# Patient Record
Sex: FEMALE | Employment: UNEMPLOYED | URBAN - METROPOLITAN AREA
[De-identification: names, ages, dates, MRNs, and addresses within clinical notes are randomized per-mention and may not be internally consistent; named-entity substitution may affect disease eponyms.]

---

## 2017-12-04 ENCOUNTER — OFFICE VISIT (OUTPATIENT)
Dept: FAMILY MEDICINE CLINIC | Age: 27
End: 2017-12-04

## 2017-12-04 VITALS
TEMPERATURE: 97.6 F | BODY MASS INDEX: 35.63 KG/M2 | HEART RATE: 65 BPM | SYSTOLIC BLOOD PRESSURE: 110 MMHG | HEIGHT: 62 IN | WEIGHT: 193.6 LBS | DIASTOLIC BLOOD PRESSURE: 71 MMHG | RESPIRATION RATE: 18 BRPM | OXYGEN SATURATION: 100 %

## 2017-12-04 DIAGNOSIS — Z3A.16 16 WEEKS GESTATION OF PREGNANCY: ICD-10-CM

## 2017-12-04 DIAGNOSIS — E66.9 OBESITY (BMI 35.0-39.9 WITHOUT COMORBIDITY): ICD-10-CM

## 2017-12-04 DIAGNOSIS — N92.6 MISSED PERIOD: Primary | ICD-10-CM

## 2017-12-04 PROBLEM — Z64.1 MULTIPARITY: Status: ACTIVE | Noted: 2017-12-04

## 2017-12-04 LAB
HCG URINE, QL. (POC): POSITIVE
VALID INTERNAL CONTROL?: YES

## 2017-12-04 NOTE — PROGRESS NOTES
[address] [facility] [address] 34   Office [phone], Fax [phone]      Subjective:     CC: Confirming pregnancy  History provided by patient and frined. Interpretor services have been used. HPI:  Murali Landon is a 33 y.o. PATIENT REFUSED female with no significant previous medical hx presenting for confirmation of pregnancy. Found out beginning of the November (LMP aug 20th). Had home pregnancy test to confirm and also had gone to a clinic. Father will be involved. This is patient's 4th baby. Pt endorses nausea but no vomiting. Endorses mild lower back pain and breast feeling b/l. Pt denies CP, SOB, bleeding, discharge, UTI sxs, HA, vision changes, leg swelling. OBhx  LMP 17. 16w6d, STEPHANIE 5/15/2018. Do you have regular periods pror?  yes  Neg for hx of GDM, GHTN, PreE, Eclampsia  GYN conditions: negative for Fibroids, adenomyosis    [id number]  Preg 1:  (8month) - 5lbs, 6 yo old girl, PTL   Preg 2: term 7lbs, girl, no complications  Preg 3: triplet -> placenta problem -> d&c  Preg 4: term, 6lbs, no complication                                          GYNhx  - Hx of STD: no  - Last pap:a year ago, results negative  - Obgyn: Seen at [place] (for clinic)  - Protection: yes  - Contraceptive method: injection  - Vaccinations: uptodate    Taking PNV    Medication reviewed  Allergy reviewed    SocHx  - smoking: no   - alcohol: no  - drugs: no  - sexually active: yes    ROS (bolded are positive):   General Negative for fever, chills, changes in weight (increase), changes in appetite   HEENT Negative for hearing loss, earache, congestion, sore throat   CV Negative for chest pain, palpitations, edema   Respiratory Negative for cough, shortness of breath, wheezing   GI Negative for change in bowel habits, abdominal pain, black or bloody stools, nausea or vomiting    Negative for frequency, dysuria, hematuria, vaginal discharge   MSK Negative for back pain, joint pain, muscle pain   Breast Negative for breast lumps, nipple discharge, galactorrhea, breast fullness   Skin Negative for itching, rash, hives   Neuro Negative for dizziness, headache, confusion, weakness   Psych Negative for anxiety, depression, change in mood   Heme/lymph Negative for bleeding, bruising, pallor     No past medical history on file. No current outpatient prescriptions on file prior to visit. No current facility-administered medications on file prior to visit. No Known Allergies    No past surgical history on file. Social History     Social History    Marital status: UNKNOWN     Spouse name: N/A    Number of children: N/A    Years of education: N/A     Occupational History    Not on file. Social History Main Topics    Smoking status: Not on file    Smokeless tobacco: Not on file    Alcohol use Not on file    Drug use: Not on file    Sexual activity: Not on file     Other Topics Concern    Not on file     Social History Narrative    No narrative on file       There is no problem list on file for this patient. Objective:   Vitals - reviewed  Visit Vitals    /71 (BP 1 Location: Right arm, BP Patient Position: Sitting)    Pulse 65    Temp 97.6 °F (36.4 °C) (Oral)    Resp 18    Ht 5' 2\" (1.575 m)    Wt 193 lb 9.6 oz (87.8 kg)    LMP 08/20/2017    SpO2 100%    BMI 35.41 kg/m2        Physical Exam   Constitutional: She is oriented to person, place, and time. She appears well-developed and well-nourished. No distress. HENT:   Head: Normocephalic and atraumatic. Nose: Nose normal.   Eyes: Conjunctivae and EOM are normal. Pupils are equal, round, and reactive to light. Neck: Normal range of motion. Neck supple. No JVD present. No tracheal deviation present. Cardiovascular: Normal rate, regular rhythm and normal heart sounds. Pulmonary/Chest: Effort normal and breath sounds normal. She has no wheezes. She exhibits no tenderness.    Breast fullness b/l Abdominal: Soft. Bowel sounds are normal. She exhibits no distension. There is no tenderness. Musculoskeletal: Normal range of motion. She exhibits no edema or tenderness (lower back tenderness started with pregnancy (mild)). Neurological: She is alert and oriented to person, place, and time. Skin: Skin is warm and dry. No rash noted. She is not diaphoretic. Psychiatric: She has a normal mood and affect. Her behavior is normal.       Labs: positive pregnancy test.      Assessment and orders:     28yo F previously healthy [id number] @ 16w6d. STEPHANIE 5/15/18. Hx of PTL with first pregnancy. ICD-10-CM ICD-9-CM    1. Missed period N92.6 626.4 AMB POC URINE PREGNANCY TEST, VISUAL COLOR COMPARISON   2. 16 weeks gestation of pregnancy Z3A.16 V22.2 prenatal vit-iron fumarate-fa 27 mg iron- 0.8 mg tab tablet   3. Obesity (BMI 35.0-39.9 without comorbidity) E66.9 278.00      1. 16 week gestation pregnancy - confirmed pregnancy. Taking prenatals. Currently uncomplicated. Follow-up Disposition:  Return in 1 week (on 12/11/2017) for Initial OB visit (8:45AM with Dr Krystyna Cramer). 2. Obesity: Will discuss weight gain and healthy eating on initial OB visit. Pt was discussed and seen by Dr Keenan Silvestre (attending physician). I have reviewed patient medical and social history and medications. I have reviewed pertinent labs results and other data. I have discussed the diagnosis with the patient and the intended plan as seen in the above orders. The patient has received an after-visit summary and questions were answered concerning future plans. I have discussed medication side effects and warnings with the patient as well.     Stacie Leyva MD  Resident [facility]  12/04/17

## 2017-12-04 NOTE — MR AVS SNAPSHOT
Visit Information Date & Time Provider Department Dept. Phone Encounter #  
 12/4/2017 10:00 AM Georgina Hillman MD [facility] [phone] [id number] Follow-up Instructions Return in 1 week (on 12/11/2017) for Initial OB visit (8:45AM with Dr Ty Bowers). Upcoming Health Maintenance Date Due  
 HPV AGE 9Y-34Y (1 of 3 - Female 3 Dose Series) 8/30/2002 DTaP/Tdap/Td series (1 - Tdap) 8/30/2012 PAP AKA CERVICAL CYTOLOGY 8/30/2012 Influenza Age 5 to Adult 8/1/2017 Allergies as of 12/4/2017  Review Complete On: 12/4/2017 By: Georgina Hillman MD  
 No Known Allergies Current Immunizations  Never Reviewed No immunizations on file. Not reviewed this visit You Were Diagnosed With   
  
 Codes Comments Missed period    -  Primary ICD-10-CM: N92.6 ICD-9-CM: 626.4 16 weeks gestation of pregnancy     ICD-10-CM: Z3A.16 
ICD-9-CM: V22.2 Obesity (BMI 35.0-39.9 without comorbidity)     ICD-10-CM: C68.6 ICD-9-CM: 278.00 Vitals BP Pulse Temp Resp Height(growth percentile) Weight(growth percentile) 110/71 (BP 1 Location: Right arm, BP Patient Position: Sitting) 65 97.6 °F (36.4 °C) (Oral) 18 5' 2\" (1.575 m) 193 lb 9.6 oz (87.8 kg) LMP SpO2 BMI OB Status 08/20/2017 100% 35.41 kg/m2 Pregnant BMI and BSA Data Body Mass Index Body Surface Area  
 35.41 kg/m 2 1.96 m 2 Your Updated Medication List  
  
   
This list is accurate as of: 12/4/17 11:10 AM.  Always use your most recent med list.  
  
  
  
  
 prenatal vit-iron fumarate-fa 27 mg iron- 0.8 mg Tab tablet Take 1 Tab by mouth daily. Prescriptions Printed Refills  
 prenatal vit-iron fumarate-fa 27 mg iron- 0.8 mg tab tablet 9 Sig: Take 1 Tab by mouth daily. Class: Print Route: Oral  
  
We Performed the Following AMB POC URINE PREGNANCY TEST, VISUAL COLOR COMPARISON [19978 CPT(R)] Follow-up Instructions Return in 1 week (on 12/11/2017) for Initial OB visit (8:45AM with Dr Adan Mccallum). Patient Instructions Semanas 14 a 18 de burch embarazo: Instrucciones de cuidado - [ Diana Contras 14 to 25 of Your Pregnancy: Care Instructions ] Instrucciones de cuidado Heather [employer], es posible que se le empiece a notar que está [place]. También podría observar algunos cambios en la piel, rajat picazón en algunas zonas de las matthew de las gray o acné en la davy. Lorena Coma, burch bebé puede orinar y herber primeras heces (meconio) comienzan a acumularse en el intestino. Vinetta Gregg a crecerle el christin en la rita. En burch próxima visita, [employer] 18 y 21, burch médico podría hacerle lennox ecografía. La prueba le permite al médico verificar si hay ciertos problemas. Burch médico también puede determinar el sexo de burch bebé. Starla es un buen momento para pensar si Chepe Johnson si burch bebé es Lovette Quivers. Hable con burch médico acerca de ponerse la vacuna contra la gripe para ayudar a mantenerse jen heather el embarazo. Con el transcurrir del [employer], es común sentirse preocupada o ansiosa. Burch cuerpo está [employer] Y usted está pensando en pineda a christiano, en la bryanna de burch bebé y en convertirse en madre. Puede aprender a sobrellevar la ansiedad y el estrés que siente. La atención de seguimiento es lennox parte clave de burch tratamiento y seguridad. Asegúrese de hacer y acudir a todas las citas, y llame a burch médico si está teniendo problemas. También es lennox buena idea saber los resultados de los exámenes y mantener lennox lista de los medicamentos que roxana. Cómo puede cuidarse en el hogar? ?Delaney Pelt ? · Pida ayuda para cocinar y [employer]. ? · Entienda quién o qué le provoca estrés. Evite a estas personas o situaciones tanto rajat le sea posible. ? · Relájese todos los días. Tomarse descansos de 10 a 15 minutos puede hacerle sentir lennox gran diferencia.  Camine, escuche música o tome un baño tibio.  
? · Wittmann clases de yoga o educación prenatal para aprender técnicas de relajación. También puede comprar un disco compacto de relajación. ? · [employer] lista de herber temores acerca de tener el bebé y ser [place]. Comparta la lista con alguien de saenz confianza. Makenzie Reef inquietudes son verdaderamente pequeñas, y trate de deshacerse de ellas. Ejercicio ? · Si no hizo mucho ejercicio antes del embarazo, comience poco a poco. Lo mejor es caminar. Regule saenz ritmo y chan un poco más cada día. ? · La caminata rápida, el trote lento, los ejercicios aeróbicos de bajo impacto, los ejercicios aeróbicos en el agua y el yoga son Bassem Adilia opciones. Algunos deportes, rajat el buceo, la equitación, el esquí Beatriz, la gimnasia y el esquí acuático no son Moriah Him idea. ? · Trate de hacer por lo menos 2½ horas de ejercicio moderado a la semana, rajat, por ejemplo, lennox caminata rápida. Mckenna Stephen de hacer esto es estar activo 30 minutos al día, por lo menos 5 días de la [place]. Está willem estar activo en bloques de 10 minutos o más ayaz el día y la [place]. ? · Use ropa holgada. Use zapatos y un sostén que le proporcionen un buen soporte. ? · Michelle Glimpse de calentamiento y enfriamiento para comenzar y finalizar herber ejercicios. ? · Si desea usar pesas, asegúrese de que jonathan livianas. Estas reducen la tensión en las articulaciones. ?Manténgase en el peso ideal para usted ? · Los expertos recomiendan el aumento de 1 hallie (medio kilo) al [employer] ayaz los 3 primeros meses del [facility]. ? · También recomiendan aumentar 1 hallie a la [employer] 6 últimos meses del [facility], para aumentar de 25 a 35 libras (11 a 16 kg) en total.  
? · Si está por debajo del peso recomendable para usted, necesitará aumentar más, de 28 a 40 libras (13 a 18 kg) aproximadamente. ? · Si tiene sobrepeso, quizás no deba aumentar tanto de [place], de 15 a 25 libras (7 a 11 kg) aproximadamente. ? · Si está subiendo de Anselmo Merritt, use saenz sentido común. [address] [employer], y LessThan3, la comida rápida y [place]. Gwyneth Kehr, lydias y verdonaldras. ? · Si va a tener gemelos o más bebés, es posible que saenz médico la remita a un dietista. Dónde puede encontrar más información en inglés? Sukumar Del Angel a http://abelardo-james.info/. Viri Don G120 en la búsqueda para aprender más acerca de \"Semanas 14 a 18 de saenz embarazo: Instrucciones de cuidado - [ Orvel Solian 14 to 25 of Your Pregnancy: Care Instructions ]. \" 
Revisado: 16 marzo, 2017 Versión del contenido: 11.4 © 5487-7598 Healthwise, Incorporated. Las instrucciones de cuidado fueron adaptadas bajo licencia por [employer] (which disclaims liability or warranty for this information). Si usted tiene [place] afección médica o sobre estas instrucciones, siempre pregunte a saenz profesional de bryanna. Healthwise, Incorporated niega toda garantía o responsabilidad por saenz uso de esta información. Introducing [facility] & HEALTH SERVICES! [employer] introduces [employer] patient portal. Now you can access parts of your medical record, email your doctor's office, and request medication refills online. 1. In your internet browser, go to [email or web address] 2. Click on the First Time User? Click Here link in the Sign In box. You will see the New Member Sign Up page. 3. Enter your SessionM Access Code exactly as it appears below. You will not need to use this code after youve completed the sign-up process. If you do not sign up before the expiration date, you must request a new code. · SessionM Access Code: [email or web address] Expires: 3/4/2018 11:03 AM 
 
4. Enter the last four digits of your Social Security Number (xxxx) and Date of Birth (mm/dd/yyyy) as indicated and click Submit. You will be taken to the next sign-up page. 5. Create a [employer] ID. This will be your [employer] login ID and cannot be changed, so think of one that is secure and easy to remember. 6. Create a [employer] password. You can change your password at any time. 7. Enter your Password Reset Question and Answer. This can be used at a later time if you forget your password. 8. Enter your e-mail address. You will receive e-mail notification when new information is available in [address] 9. Click Sign Up. You can now view and download portions of your medical record. 10. Click the Download Summary menu link to download a portable copy of your medical information. If you have questions, please visit the Frequently Asked Questions section of the [employer] website. Remember, [employer] is NOT to be used for urgent needs. For medical emergencies, dial 911. Now available from your iPhone and Android! Please provide this summary of care documentation to your next provider. If you have any questions after today's visit, please call [phone].

## 2017-12-11 ENCOUNTER — INITIAL PRENATAL (OUTPATIENT)
Dept: FAMILY MEDICINE CLINIC | Age: 27
End: 2017-12-11

## 2017-12-11 VITALS
RESPIRATION RATE: 16 BRPM | HEART RATE: 74 BPM | BODY MASS INDEX: 34.96 KG/M2 | SYSTOLIC BLOOD PRESSURE: 100 MMHG | HEIGHT: 62 IN | OXYGEN SATURATION: 99 % | TEMPERATURE: 97 F | WEIGHT: 190 LBS | DIASTOLIC BLOOD PRESSURE: 67 MMHG

## 2017-12-11 DIAGNOSIS — Z3A.16 16 WEEKS GESTATION OF PREGNANCY: ICD-10-CM

## 2017-12-11 DIAGNOSIS — Z87.898: ICD-10-CM

## 2017-12-11 DIAGNOSIS — Z87.51 HISTORY OF PRETERM DELIVERY: ICD-10-CM

## 2017-12-11 DIAGNOSIS — Z98.890 S/P D&C (STATUS POST DILATION AND CURETTAGE): ICD-10-CM

## 2017-12-11 DIAGNOSIS — Z3A.16 16 WEEKS GESTATION OF PREGNANCY: Primary | ICD-10-CM

## 2017-12-11 LAB
ANTIBODY SCREEN, EXTERNAL: NEGATIVE
HIV, EXTERNAL: NONREACTIVE
RUBELLA, EXTERNAL: NORMAL
T. PALLIDUM, EXTERNAL: NEGATIVE
TYPE, ABO & RH, EXTERNAL: NORMAL

## 2017-12-11 NOTE — MR AVS SNAPSHOT
Visit Information Date & Time Provider Department Dept. Phone Encounter #  
 2017  8:45 AM Adams Cox MD [facility] [phone] [id number] Upcoming Health Maintenance Date Due  
 HPV AGE 9Y-34Y (1 of 3 - Female 3 Dose Series) 2002 DTaP/Tdap/Td series (1 - Tdap) 2012 PAP AKA CERVICAL CYTOLOGY 2012 Influenza Age 5 to Adult 2017 Allergies as of 2017  Review Complete On: 2017 By: Adams Cox MD  
 No Known Allergies Current Immunizations  Never Reviewed No immunizations on file. Not reviewed this visit You Were Diagnosed With   
  
 Codes Comments 16 weeks gestation of pregnancy    -  Primary ICD-10-CM: Z3A.16 
ICD-9-CM: V22.2 History of  delivery     ICD-10-CM: Z87.51 
ICD-9-CM: V13.21 Vitals BP Pulse Temp Resp Height(growth percentile) Weight(growth percentile) 100/67 74 97 °F (36.1 °C) (Oral) 16 5' 2\" (1.575 m) 190 lb (86.2 kg) LMP SpO2 BMI OB Status Smoking Status 2017 (Exact Date) 99% 34.75 kg/m2 Pregnant Never Smoker BMI and BSA Data Body Mass Index Body Surface Area 34.75 kg/m 2 1.94 m 2 Preferred Pharmacy Pharmacy Name Phone [facility] [address] Your Updated Medication List  
  
   
This list is accurate as of: 17  9:47 AM.  Always use your most recent med list.  
  
  
  
  
 prenatal vit-iron fumarate-fa 27 mg iron- 0.8 mg Tab tablet Take 1 Tab by mouth daily. We Performed the Following AFP TETRA SCREEN, OBSTETRICAL [id number] CPT(R)] ANTIBODY SCREEN [id number] CPT(R)] BLOOD TYPE, (ABO+RH) [40670 CPT(R)] CBC W/O DIFF [81945 CPT(R)] CHLAMYDIA/GC PCR [78416 CPT(R)] HEPATITIS B SURFACE ANTIGEN REFLEX TO CONFIRMATION [[id number] Custom] HIV 1/2 AG/AB, 4TH GENERATION,W RFLX CONFIRM [id number] CPT(R)] RUBELLA AB, IGG W4590742 CPT(R)] T PALLIDUM SCREEN W/REFLEX [[id number] Custom] VZV AB, IGG D441942 CPT(R)] Patient Instructions Weeks 14 to 18 of Your Pregnancy: Care Instructions Your Care Instructions During this time, you may start to \"show,\" so that you look pregnant to people around you. You may also notice some changes in your skin, such as itchy spots on your palms or acne on your face. Your baby is now able to pass urine, and your baby's first stool (meconium) is starting to collect in his or her intestines. Hair is also beginning to grow on your baby's head. At your next visit, between weeks 18 and 20, your doctor may do an ultrasound test. The test allows your doctor to check for certain problems. Your doctor can also tell the sex of your baby. This is a good time to think about whether you want to know whether your baby is a boy or a girl. Talk to your doctor about getting a flu shot to help keep you healthy during your pregnancy. As your pregnancy moves along, it is common to worry or feel anxious. Your body is changing a lot. And you are thinking about giving birth, the health of your baby, and becoming a parent. You can learn to cope with any anxiety and stress you feel. Follow-up care is a key part of your treatment and safety. Be sure to make and go to all appointments, and call your doctor if you are having problems. It's also a good idea to know your test results and keep a list of the medicines you take. How can you care for yourself at home? ?Reduce stress ? · Ask for help with cooking and housekeeping. ? · Figure out who or what causes your stress. Avoid these people or situations as much as possible. ? · Relax every day. Taking 10- to 15-minute breaks can make a big difference. Take a walk, listen to music, or take a warm bath. ? · Learn relaxation techniques at prenatal or yoga class. Or buy a relaxation tape. ? · List your fears about having a baby and becoming a parent. Share the list with someone you trust. Decide which worries are really small, and try to let them go. Exercise ? · If you did not exercise much before pregnancy, start slowly. Walking is best. Keli Kangirn yourself, and do a little more every day. ? · Brisk walking, easy jogging, low-impact aerobics, water aerobics, and yoga are good choices. Some sports, such as scuba diving, horseback riding, downhill skiing, gymnastics, and water skiing, are not a good idea. ? · Try to do at least 2½ hours a week of moderate exercise, such as a fast walk. One way to do this is to be active 30 minutes a day, at least 5 days a week. It's fine to be active in blocks of 10 minutes or more throughout your day and week. ? · Wear loose clothing. And wear shoes and a bra that provide good support. ? · Warm up and cool down to start and finish your exercise. ? · If you want to use weights, be sure to use light weights. They reduce stress on your joints. ?Stay at the best weight for you ? · Experts recommend that you gain about 1 pound a month during the first 3 months of your pregnancy. ? · Experts recommend that you gain about 1 pound a week during your last 6 months of pregnancy, for a total weight gain of 25 to 35 pounds. ? · If you are underweight, you will need to gain more weight (about 28 to 40 pounds). ? · If you are overweight, you may not need to gain as much weight (about 15 to 25 pounds). ? · If you are gaining weight too fast, use common sense. Exercise every day, and limit sweets, fast foods, and fats. Choose lean meats, fruits, and vegetables. ? · If you are having twins or more, your doctor may refer you to a dietitian. Where can you learn more? Go to http://abelardo-james.info/. Enter N286 in the search box to learn more about \"Weeks 14 to 18 of Your Pregnancy: Care Instructions. \" Current as of: March 16, 2017 Content Version: 11.4 © 0326-0809 Healthwise, [employer]. Care instructions adapted under license by [employer] (which disclaims liability or warranty for this information). If you have questions about a medical condition or this instruction, always ask your healthcare professional. [address] any warranty or liability for your use of this information. Introducing [facility] & HEALTH SERVICES! Hanna Ferreira introduces [employer] patient portal. Now you can access parts of your medical record, email your doctor's office, and request medication refills online. 1. In your internet browser, go to [email or web address] 2. Click on the First Time User? Click Here link in the Sign In box. You will see the New Member Sign Up page. 3. Enter your [employer] Access Code exactly as it appears below. You will not need to use this code after youve completed the sign-up process. If you do not sign up before the expiration date, you must request a new code. · [employer] Access Code: [email or web address] Expires: 3/4/2018 11:03 AM 
 
4. Enter the last four digits of your Social Security Number (xxxx) and Date of Birth (mm/dd/yyyy) as indicated and click Submit. You will be taken to the next sign-up page. 5. Create a [employer] ID. This will be your [employer] login ID and cannot be changed, so think of one that is secure and easy to remember. 6. Create a [employer] password. You can change your password at any time. 7. Enter your Password Reset Question and Answer. This can be used at a later time if you forget your password. 8. Enter your e-mail address. You will receive e-mail notification when new information is available in [address] 9. Click Sign Up. You can now view and download portions of your medical record. 10. Click the Download Summary menu link to download a portable copy of your medical information. If you have questions, please visit the Frequently Asked Questions section of the [employer] website. Remember, [employer] is NOT to be used for urgent needs. For medical emergencies, dial 911. Now available from your iPhone and Android! Please provide this summary of care documentation to your next provider. If you have any questions after today's visit, please call [phone].

## 2017-12-11 NOTE — PROGRESS NOTES
Chief Complaint   Patient presents with    Initial Prenatal Visit     Jack Raymond 1d today. 1. Have you been to the ER, urgent care clinic since your last visit? Hospitalized since your last visit? N/A    2. Have you seen or consulted any other health care providers outside of the [address] [place] [address] since your last visit? Include any pap smears or colon screening.  N/A

## 2017-12-11 NOTE — PROGRESS NOTES
[address] [facility] [address] [place] [address]   Office [phone], Fax [phone]      Subjective:   Stephanie Chapman is a 33 y.o. female who is being seen today for her first obstetrical visit. This is a planned pregnancy. Pt's friend was there for translation (consent form filled out). She is at 16w1d by LMP Aug 20th. STEPHANIE 2018. 1hr glucoca given. Pt denies vaginal discharge, bleeding, burning with urination, abdominal pain, HA, vision changes, CP, sob. +breast fullness. [id number]  Preg 1:  (8month) - 5lbs, 7 yo old girl, PTL   Preg 2: term 7lbs, girl, no complications  Preg 3: triplet -> placenta problem -> d&c  Preg 4: term, 6lbs, no complication      History of GDM or DM? No    History of GHTN or HTN? No    History of pre-eclampsia? No    Relationship with FOB:     She is taking prenatal vitamins. Desires first trimester dating ultrasound? yes    Desires second trimester fetal anatomy ultrasound? yes    Desires genetic testing for Down's Syndrome? yes      Allergies- reviewed:   No Known Allergies      Medications- reviewed:   Current Outpatient Prescriptions   Medication Sig    prenatal vit-iron fumarate-fa 27 mg iron- 0.8 mg tab tablet Take 1 Tab by mouth daily. No current facility-administered medications for this visit. Past Medical History- reviewed:  History reviewed. No pertinent past medical history. Past Surgical History- reviewed:   History reviewed. No pertinent surgical history. Social History- reviewed:  Social History     Social History    Marital status: UNKNOWN     Spouse name: N/A    Number of children: N/A    Years of education: N/A     Occupational History    Not on file.      Social History Main Topics    Smoking status: Never Smoker    Smokeless tobacco: Never Used    Alcohol use No    Drug use: No    Sexual activity: Yes     Partners: Male     Other Topics Concern    Not on file     Social History Narrative Immunizations- reviewed: There is no immunization history on file for this patient. Flu vaccine to be given next visit (tomorrow). Tdap to get 28-32wk    ROS  : Denies vaginal bleeding and leaking of fluid  GI: Endorses occasional nausea and vomiting      Objective:     Visit Vitals    /67    Pulse 74    Temp 97 °F (36.1 °C) (Oral)    Resp 16    Ht 5' 2\" (1.575 m)    Wt 190 lb (86.2 kg)    LMP 2017 (Exact Date)    SpO2 99%    BMI 34.75 kg/m2       Physical Exam:  GENERAL APPEARANCE: alert, well appearing, in no apparent distress  HEAD: normocephalic, atraumatic   LUNGS: clear to auscultation, no wheezes, rales or rhonchi, symmetric air entry  HEART: regular rate and rhythm, no murmurs  ABDOMEN: FHT present 145  BACK: no CVA tenderness  EXTERNAL GENITALIA: normal appearing vulva with no masses, tenderness or lesions  VAGINA: no abnormal discharge or lesions  CERVIX: no lesions or cervical motion tenderness  UTERUS: gravid  EXTREMITIES: no redness or tenderness in the calves or thighs, no edema  NEUROLOGICAL: alert, oriented, normal speech, no focal findings or movement disorder noted  SKIN: normal coloration and turgor, no rashes    Exam chaperoned by Nurse Marisol Alexandra    Labs:    No results found for this or any previous visit (from the past 12 hour(s)). Assessment:     Pregnancy at 16w1d by LMP      ICD-10-CM ICD-9-CM    1. 16 weeks gestation of pregnancy Z3A.16 V22.2 RUBELLA AB, IGG      VZV AB, IGG      T PALLIDUM SCREEN W/REFLEX      HEPATITIS B SURFACE ANTIGEN REFLEX TO CONFIRMATION      HIV 1/2 AG/AB, 4TH GENERATION,W RFLX CONFIRM      CHLAMYDIA/GC PCR      BLOOD TYPE, (ABO+RH)      ANTIBODY SCREEN      CBC W/O DIFF      AFP TETRA SCREEN, OBSTETRICAL      GLUCOSE, GESTATIONAL 1 HR TOLERANCE   2. History of  delivery Z87.51 V13.21    3.  Hx of triplet pregnancy in prior pregnancy Z87.59 V13.29        Plan:   · Initial labs/specimens collected (CBC, blood type & screen, Rh antibody screen, rubella titer, HBsAg titer, RPR, HIV, gonorrhea/chlamydia cx, 1hr Gtt due to BMI). F/u on results. Pap neg a year ago (acquiring results from [facility]), UA to be done on . · Hx of  delivery with 2 subsequent term deliveries - pt will be offered the choice for progesterone supplementation on first trimester ultrasound. · Flu shot to be given on 17 with first trimester ultrasound. Provider informed. Tdap during 28-32wks. · Patient scheduled for 1st trimester dating ultrasound on 17. · Patient scheduled for 2nd trimester fetal anatomy ultrasound with MFM on 2017. · Genetic testing for Down Syndrome is performed today. F/u on results. · Recommended prenatal vitamins - currently taking. · Follow-up in 4 week(s) for routine follow-up with Dr Deni Bustos. Discussed the patient's BMI with her. The BMI follow up plan is as follows: I have counseled this patient on diet and exercise regimens. The patient was counseled on the dangers of tobacco use, and pt does not smoke. Emergency contact info confirmed:   Cell: Will acquire next visit.     Orders Placed This Encounter    RUBELLA AB, IGG    VARICELLA-ZOSTER V AB, IGG    T PALLIDUM SCREEN W/REFLEX    HEPATITIS B SURFACE ANTIGEN REFLEX TO CONFIRMATION    HIV 1/2 AG/AB, 4TH GENERATION,W RFLX CONFIRM    CHLAMYDIA/GC PCR     Order Specific Question:   Sample source     Answer:   Cervical/vaginal swab [573]     Order Specific Question:   Specimen source     Answer:   Endocervix [350]    CBC W/O DIFF    AFP TETRA SCREEN, OBSTETRICAL     Order Specific Question:   Patient Height     Answer:   5     Order Specific Question:   Patient Weight     Answer:   190    GLUCOSE, GESTATIONAL 1 HR TOLERANCE     Standing Status:   Future     Number of Occurrences:   1     Standing Expiration Date:   2018    BLOOD TYPE, (ABO+RH)    ANTIBODY SCREEN       I have discussed the diagnosis with the patient and the intended plan as seen in the above orders. The patient has received an after-visit summary and questions were answered concerning future plans. I have discussed medication side effects and warnings with the patient as well. Informed pt to return to the office or go to the ER if she experiences vaginal bleeding, vaginal discharge, leaking of fluid, pelvic cramping.       Pt seen and discussed with Dr Vilma Dey (attending physician)    Man Morales MD  12/11/2017

## 2017-12-11 NOTE — PATIENT INSTRUCTIONS
Weeks 14 to 18 of Your Pregnancy: Care Instructions  Your Care Instructions    During this time, you may start to \"show,\" so that you look pregnant to people around you. You may also notice some changes in your skin, such as itchy spots on your palms or acne on your face. Your baby is now able to pass urine, and your baby's first stool (meconium) is starting to collect in his or her intestines. Hair is also beginning to grow on your baby's head. At your next visit, between weeks 18 and 20, your doctor may do an ultrasound test. The test allows your doctor to check for certain problems. Your doctor can also tell the sex of your baby. This is a good time to think about whether you want to know whether your baby is a boy or a girl. Talk to your doctor about getting a flu shot to help keep you healthy during your pregnancy. As your pregnancy moves along, it is common to worry or feel anxious. Your body is changing a lot. And you are thinking about giving birth, the health of your baby, and becoming a parent. You can learn to cope with any anxiety and stress you feel. Follow-up care is a key part of your treatment and safety. Be sure to make and go to all appointments, and call your doctor if you are having problems. It's also a good idea to know your test results and keep a list of the medicines you take. How can you care for yourself at home? ?Reduce stress  ? · Ask for help with cooking and housekeeping. ? · Figure out who or what causes your stress. Avoid these people or situations as much as possible. ? · Relax every day. Taking 10- to 15-minute breaks can make a big difference. Take a walk, listen to music, or take a warm bath. ? · Learn relaxation techniques at prenatal or yoga class. Or buy a relaxation tape. ? · List your fears about having a baby and becoming a parent. Share the list with someone you trust. Decide which worries are really small, and try to let them go. Exercise  ?  · If you did not exercise much before pregnancy, start slowly. Walking is best. Naomi Lemme yourself, and do a little more every day. ? · Brisk walking, easy jogging, low-impact aerobics, water aerobics, and yoga are good choices. Some sports, such as scuba diving, horseback riding, downhill skiing, gymnastics, and water skiing, are not a good idea. ? · Try to do at least 2½ hours a week of moderate exercise, such as a fast walk. One way to do this is to be active 30 minutes a day, at least 5 days a week. It's fine to be active in blocks of 10 minutes or more throughout your day and week. ? · Wear loose clothing. And wear shoes and a bra that provide good support. ? · Warm up and cool down to start and finish your exercise. ? · If you want to use weights, be sure to use light weights. They reduce stress on your joints. ?Stay at the best weight for you  ? · Experts recommend that you gain about 1 pound a month during the first 3 months of your pregnancy. ? · Experts recommend that you gain about 1 pound a week during your last 6 months of pregnancy, for a total weight gain of 25 to 35 pounds. ? · If you are underweight, you will need to gain more weight (about 28 to 40 pounds). ? · If you are overweight, you may not need to gain as much weight (about 15 to 25 pounds). ? · If you are gaining weight too fast, use common sense. Exercise every day, and limit sweets, fast foods, and fats. Choose lean meats, fruits, and vegetables. ? · If you are having twins or more, your doctor may refer you to a dietitian. Where can you learn more? Go to http://abelardo-james.info/. Enter I800 in the search box to learn more about \"Weeks 14 to 18 of Your Pregnancy: Care Instructions. \"  Current as of: March 16, 2017  Content Version: 11.4  © 4093-8873 [employer]. Care instructions adapted under license by [employer] (which disclaims liability or warranty for this information).  If you have questions about a medical condition or this instruction, always ask your healthcare professional. [place] [address] any warranty or liability for your use of this information.

## 2017-12-11 NOTE — PROGRESS NOTES
I saw and evaluated the patient, performing the key elements of the service. I discussed the findings, assessment and plan with the resident and agree with the resident's findings and plan as documented in the resident's note. 08ZV [id number] at 16w1d by LMP (pt has regular periods, is sure of LMP)  Prior pregnancy complicated by  delivery during first pregnancy with 2 subsequent full term deliveries.     /67  Pulse 74  Temp 97 °F (36.1 °C) (Oral)   Resp 16  Ht 5' 2\" (1.575 m)  Wt 190 lb (86.2 kg)  LMP 2017 (Exact Date)  SpO2 99%  BMI 34.75 kg/m2   NAD  Unable to assess fundus 2/2 body habitus  FTH present    Prenatal labs ordered  1hr gtt ordered due to obesity   Dating US at [facility] in next 1-2 wks  MFM 20US request sent

## 2017-12-12 ENCOUNTER — OFFICE VISIT (OUTPATIENT)
Dept: FAMILY MEDICINE CLINIC | Age: 27
End: 2017-12-12

## 2017-12-12 VITALS
HEART RATE: 87 BPM | OXYGEN SATURATION: 98 % | WEIGHT: 192 LBS | DIASTOLIC BLOOD PRESSURE: 69 MMHG | TEMPERATURE: 97.5 F | SYSTOLIC BLOOD PRESSURE: 109 MMHG | HEIGHT: 62 IN | RESPIRATION RATE: 17 BRPM | BODY MASS INDEX: 35.33 KG/M2

## 2017-12-12 DIAGNOSIS — R82.71 ASYMPTOMATIC BACTERIURIA DURING PREGNANCY IN SECOND TRIMESTER: ICD-10-CM

## 2017-12-12 DIAGNOSIS — Z34.80 ENCOUNTER FOR SUPERVISION OF OTHER NORMAL PREGNANCY, UNSPECIFIED TRIMESTER: Primary | ICD-10-CM

## 2017-12-12 DIAGNOSIS — O99.891 ASYMPTOMATIC BACTERIURIA DURING PREGNANCY IN SECOND TRIMESTER: ICD-10-CM

## 2017-12-12 DIAGNOSIS — Z23 ENCOUNTER FOR IMMUNIZATION: ICD-10-CM

## 2017-12-12 LAB
BILIRUB UR QL STRIP: NEGATIVE
GLUCOSE UR-MCNC: NEGATIVE MG/DL
KETONES P FAST UR STRIP-MCNC: NEGATIVE MG/DL
PH UR STRIP: 7 [PH] (ref 4.6–8)
PROT UR QL STRIP: NEGATIVE
SP GR UR STRIP: 1.01 (ref 1–1.03)
UA UROBILINOGEN AMB POC: NORMAL (ref 0.2–1)
URINALYSIS CLARITY POC: CLEAR
URINALYSIS COLOR POC: YELLOW
URINE BLOOD POC: NORMAL
URINE LEUKOCYTES POC: NORMAL
URINE NITRITES POC: NEGATIVE

## 2017-12-12 NOTE — PROGRESS NOTES
[facility]     Dating Ultrasound Procedure Report    Wendy Barnes is a 33 y.o. G5 [id number] with pregnancy at 16w 2d by LMP (2017) here for dating ultrasound. Role of ultrasound in pregnancy discussed with patient. Patient consents to undergo dating ultrasound. Patient reports nausea and vomiting every morning since becoming pregnant. She is not taking any medication for symptom. [id number]  Preg 1:  (8month) - 5lbs, 6 yo girl, PTL   Preg 2: term 7lbs, 11 yo girl, no complications  Preg 3: triplet -> placenta problem -> d&c  Preg 4: term, 6lbs, 3 yo boy, no complications    [facility]  OFFICE PROCEDURE PROGRESS NOTE      Chart reviewed for the following:   Edgar Carney MD, have reviewed the History, Physical and updated the Allergic reactions for [address] performed immediately prior to start of procedure:   Edgar Carney MD, have performed the following reviews on Wendy Barnes prior to the start of the procedure:            * Patient was identified by name and date of birth   * Agreement on procedure being performed was verified  * Risks and Benefits explained to the patient  * Procedure site verified and marked as necessary  * Patient was positioned for comfort  * Consent was signed and verified     Time: 2:06 PM    Date of procedure: 2017    Procedure performed by:  Marcelo Michelle MD    Provider assisted by: Oumou Mahoney MD    Patient assisted by: self    How tolerated by patient: tolerated the procedure well with no complications    Procedure in detail:    Ultrasound performed at bedside for evaluation of pregnancy. Ultrasound Type: Transabdominal  Findings: Single intrauterine pregnancy with EGA 14 weeks 3 days by CRL and fetal biometrics measurements on transabdominal ultrasound.   Positive fetal movement, fetal heart beat present, normal appearing amiotic fluid, normal uterus, other maternal structures not visualized. STEPHANIE 2018. CRL: 8.13 cm  BPD: 2.66 cm  HC: 10.60 cm  FL: 1.30 cm  FHR: 170 BPM  Estimated Gestational Age by Ultrasound: 14 weeks 3 days  Fetal Position: Breech  Placenta: Anterior   Assessment and Plan:     Case of a 32 yo  with pregnancy at [place] [address] 3d as suggested by transabdominal ultrasound findings showing a single intrauterine pregnancy with CRL and biometric measurements consistent with a 14w 3d fetus. STEPHANIE 2018 as per dating US. 1. As per history of a premature delivery and spontaneous , pt will be started on Tessa 250 mg IM weekly starting at EGA 16w to reduce risk of  labor. 2. Patient refuse genetic testing during this pregnancy. 3. Patient recommended to take OTC Zantac for management of morning nausea and vomiting. 4. Follow up with routine prenatal care and follow up visit in 2 weeks. Dr. Blas Heimlich (attending) present for entire procedure.   Edgar Valadez MD  PGY-2 Family Medicine Resident

## 2017-12-12 NOTE — PATIENT INSTRUCTIONS
Semanas 14 a 18 de burch embarazo: Instrucciones de cuidado - [ Anais Louisa 14 to 25 of Your Pregnancy: Care Instructions ]  Instrucciones de cuidado    [address] [place] [address], es posible que se le empiece a notar que está [place]. También podría observar algunos cambios en la piel, rajat picazón en algunas zonas de las matthew de las gray o acné en la davy. Arely Jean-Baptiste, burch bebé puede orinar y herber primeras heces (meconio) comienzan a acumularse en el intestino. Ardeth Kins a crecerle el christin en la rita. En burch próxima visita, [employer] 18 y 21, burch médico podría hacerle lennox ecografía. La prueba le permite al médico verificar si hay ciertos problemas. Burch médico también puede determinar el sexo de burch bebé. Starla es un buen momento para pensar si Estefany Hertz si burch bebé es Sandoval John. Hable con burch médico acerca de ponerse la vacuna contra la gripe para ayudar a mantenerse jen ayaz el embarazo. Con el transcurrir del Abhi Morse, es común sentirse preocupada o ansiosa. Burch cuerpo está [employer] Y usted está pensando en pineda a christiano, en la bryanna de burch bebé y en convertirse en madre. Puede aprender a sobrellevar la ansiedad y el estrés que siente. La atención de seguimiento es lennox parte clave de burch tratamiento y seguridad. Asegúrese de hacer y acudir a todas las citas, y llame a burch médico si está teniendo problemas. También es lennox buena idea saber los resultados de los exámenes y mantener lennox lista de los medicamentos que roxana. ¿Cómo puede cuidarse en el hogar? ?Phi Lava  ? · Pida ayuda para cocinar y [employer]. ? · Entienda quién o qué le provoca estrés. Evite a estas personas o situaciones tanto rajat le sea posible. ? · Relájese todos los días. Tomarse descansos de 10 a 15 minutos puede hacerle sentir lennox gran diferencia. Camine, escuche música o tome un baño tibio. ? · Excello clases de yoga o educación prenatal para aprender técnicas de relajación.  También puede comprar un disco compacto de relajación. ? · [employer] lista de herber temores acerca de tener el bebé y ser [place]. Comparta la lista con alguien de saenz confianza. Mohsen Holley inquietudes son verdaderamente pequeñas, y trate de deshacerse de ellas. Ejercicio  ? · Si no hizo mucho ejercicio antes del embarazo, comience poco a poco. Lo mejor es caminar. Regule saenz ritmo y chan un poco más cada día. ? · La caminata rápida, el trote lento, los ejercicios aeróbicos de bajo impacto, los ejercicios aeróbicos en el agua y el yoga son Lesley Opal opciones. Algunos deportes, rajat el buceo, la equitación, el esquí Beatriz, la gimnasia y el esquí acuático no son Kevin Kussmaul idea. ? · Trate de hacer por lo menos 2½ horas de ejercicio moderado a la semana, rajat, por ejemplo, lennox caminata rápida. Valerie Duff de hacer esto es estar activo 30 minutos al día, por lo menos 5 días de la [place]. Está willem estar activo en bloques de 10 minutos o más ayaz el día y la [place]. ? · Use ropa holgada. Use zapatos y un sostén que le proporcionen un buen soporte. ? · Mabelene Edda de calentamiento y enfriamiento para comenzar y finalizar herber ejercicios. ? · Si desea usar pesas, asegúrese de que jonathan livianas. Estas reducen la tensión en las articulaciones. ?Manténgase en el peso ideal para usted  ? · Los expertos recomiendan el aumento de 1 hallie (medio kilo) al [employer] ayaz los 3 primeros meses del [facility]. ? · También recomiendan aumentar 1 hallie a la [employer] 6 últimos meses del [facility], para aumentar de 25 a 35 libras (11 a 16 kg) en total.   ? · Si está por debajo del peso recomendable para usted, necesitará aumentar más, de 28 a 40 libras (13 a 18 kg) aproximadamente. ? · Si tiene sobrepeso, quizás no deba aumentar tanto de [place], de 15 a 25 libras (7 a 11 kg) aproximadamente. ? · Si está subiendo de Anselmo Merritt, use saenz sentido común. Mabelene [employer], y "[employer] la comida rápida y [employer]. Reina Fare, frutas y verduras. ? · Si va a tener gemelos o más bebés, es posible que saenz médico la remita a un dietista. ¿Dónde puede encontrar más información en inglés? Homer Santiago a http://abelardo-james.info/. Glorya Sacks P754 en la búsqueda para aprender más acerca de \"Semanas 14 a 18 de saenz embarazo: Instrucciones de cuidado - [ Shahana Riding 14 to 25 of Your Pregnancy: Care Instructions ]. \"  Revisado: 16 marzo, 2017  Versión del contenido: 11.4  © 9641-5880 [employer], [employer]. Las instrucciones de cuidado fueron adaptadas bajo licencia por [employer] (which disclaims liability or warranty for this information). Si usted tiene Bertie Bensalem afección médica o sobre estas instrucciones, siempre pregunte a saenz profesional de bryanna. [employer], [employer] niega toda garantía o responsabilidad por saenz uso de esta información.

## 2017-12-12 NOTE — MR AVS SNAPSHOT
Visit Information Date & Time Provider Department Dept. Phone Encounter #  
 12/12/2017  2:00 PM David Glaser MD [facility] [phone] [id number] Follow-up Instructions Return in about 4 weeks (around 1/9/2018), or if symptoms worsen or fail to improve, for F/U Prenatal care. Upcoming Health Maintenance Date Due  
 HPV AGE 9Y-34Y (1 of 3 - Female 3 Dose Series) 8/30/2002 DTaP/Tdap/Td series (1 - Tdap) 8/30/2012 PAP AKA CERVICAL CYTOLOGY 8/30/2012 Influenza Age 5 to Adult 8/1/2017 Allergies as of 12/12/2017  Review Complete On: 12/12/2017 By: Corrinne Mitten, LPN No Known Allergies Current Immunizations  Never Reviewed No immunizations on file. Not reviewed this visit You Were Diagnosed With   
  
 Codes Comments Encounter for supervision of other normal pregnancy, unspecified trimester    -  Primary ICD-10-CM: Z34.80 ICD-9-CM: V22.1 Asymptomatic bacteriuria during pregnancy in second trimester     ICD-10-CM: O99.89, R82.71 ICD-9-CM: 646.53 Vitals BP Pulse Temp Resp Height(growth percentile) Weight(growth percentile) 109/69 87 97.5 °F (36.4 °C) (Oral) 17 5' 2\" (1.575 m) 192 lb (87.1 kg) LMP SpO2 BMI OB Status Smoking Status 08/20/2017 (Exact Date) 98% 35.12 kg/m2 Pregnant Never Smoker BMI and BSA Data Body Mass Index Body Surface Area  
 35.12 kg/m 2 1.95 m 2 Preferred Pharmacy Pharmacy Name Phone [facility] [address] Your Updated Medication List  
  
   
This list is accurate as of: 12/12/17  2:57 PM.  Always use your most recent med list.  
  
  
  
  
 prenatal vit-iron fumarate-fa 27 mg iron- 0.8 mg Tab tablet Take 1 Tab by mouth daily. We Performed the Following AMB POC URINALYSIS DIP STICK AUTO W/O MICRO [66345 CPT(R)] CULTURE, URINE [id number] CPT(R)] Follow-up Instructions Return in about 4 weeks (around 1/9/2018), or if symptoms worsen or fail to improve, for F/U Prenatal care. Patient Instructions Semanas 14 a 18 de burch embarazo: Instrucciones de cuidado - [ Franklin Mason 14 to 25 of Your Pregnancy: Care Instructions ] Instrucciones de cuidado Heather [employer], es posible que se le empiece a notar que está [place]. También podría observar algunos cambios en la piel, rajat picazón en algunas zonas de las matthew de las gray o acné en la davy. Boris Cuevas, burch bebé puede orinar y herber primeras heces (meconio) comienzan a acumularse en el intestino. Gagandeep Royals a crecerle el christin en la rita. En burch próxima visita, [employer] 18 y 21, burch médico podría hacerle lennox ecografía. La prueba le permite al médico verificar si hay ciertos problemas. Burch médico también puede determinar el sexo de burch bebé. Starla es un buen momento para pensar si Mazin Chrystalboy si burch bebé es Shilpa Yuval. Hable con burch médico acerca de ponerse la vacuna contra la gripe para ayudar a mantenerse jen heather el embarazo. Con el transcurrir del Kianna, es común sentirse preocupada o ansiosa. Burch cuerpo está [employer] Y usted está pensando en pineda a christiano, en la bryanna de burch bebé y en convertirse en madre. Puede aprender a sobrellevar la ansiedad y el estrés que siente. La atención de seguimiento es lennox parte clave de burch tratamiento y seguridad. Asegúrese de hacer y acudir a todas las citas, y llame a burch médico si está teniendo problemas. También es lennox buena idea saber los resultados de los exámenes y mantener lennox lista de los medicamentos que roxana. Cómo puede cuidarse en el hogar? ?Lorren Gerson ? · Pida ayuda para cocinar y [employer]. ? · Entienda quién o qué le provoca estrés. Evite a estas personas o situaciones tanto rajat le sea posible. ? · Relájese todos los días.  Tomarse descansos de 10 a 15 minutos puede hacerle sentir lennox gran diferencia. Camine, escuche música o tome un baño tibio. ? · [place] clases de yoga o educación prenatal para aprender técnicas de relajación. También puede comprar un disco compacto de relajación. ? · [employer] lista de herber temores acerca de tener el bebé y ser [place]. Comparta la lista con alguien de saenz confianza. Xavi Hernandes inquietudes son verdaderamente pequeñas, y trate de deshacerse de ellas. Ejercicio ? · Si no hizo mucho ejercicio antes del embarazo, comience poco a poco. Lo mejor es caminar. Regule saenz ritmo y chan un poco más cada día. ? · La caminata rápida, el trote lento, los ejercicios aeróbicos de bajo impacto, los ejercicios aeróbicos en el agua y el yoga son Mattawan Poles opciones. Algunos deportes, rajat el buceo, la equitación, el esquí Beatriz, la gimnasia y el esquí acuático no son Brooke Brawn idea. ? · Trate de hacer por lo menos 2½ horas de ejercicio moderado a la semana, rajat, por ejemplo, lennox caminata rápida. Naomia Leahy de hacer esto es estar activo 30 minutos al día, por lo menos 5 días de la [place]. Está willem estar activo en bloques de 10 minutos o más ayaz el día y la [place]. ? · Use ropa holgada. Use zapatos y un sostén que le proporcionen un buen soporte. ? · Camilo Ream de calentamiento y enfriamiento para comenzar y finalizar herber ejercicios. ? · Si desea usar pesas, asegúrese de que jonathan livianas. Estas reducen la tensión en las articulaciones. ?Manténgase en el peso ideal para usted ? · Los expertos recomiendan el aumento de 1 hallie (medio kilo) al [employer] ayaz los 3 primeros meses del [facility]. ? · También recomiendan aumentar 1 hallie a la [employer] 6 últimos meses del [facility], para aumentar de 25 a 35 libras (11 a 16 kg) en total.  
? · Si está por debajo del peso recomendable para usted, necesitará aumentar más, de 28 a 40 libras (13 a 18 kg) aproximadamente.   
? · Si tiene sobrepeso, quizás no deba [address] de [place], de 15 a 25 libras (7 a 11 kg) aproximadamente. ? · Si está subiendo de Anselmo Merritt, use saenz sentido común. [address] [employer], y Di[employer], la comida rápida y [place]. Moriah Bruins, frutas y verduras. ? · Si va a tener gemelos o más bebés, es posible que saenz médico la remita a un dietista. Dónde puede encontrar más información en inglés? Jorge Labella a http://abelardo-james.info/. [facility] E488 en la búsqueda para aprender más acerca de \"Semanas 14 a 18 de saenz embarazo: Instrucciones de cuidado - [ Marrero Milo 14 to 25 of Your Pregnancy: Care Instructions ]. \" 
Revisado: 16 marzo, 2017 Versión del contenido: 11.4 © 0109-4366 Healthwise, Incorporated. Las instrucciones de cuidado fueron adaptadas bajo licencia por Good [employer] (which disclaims liability or warranty for this information). Si usted tiene [place] afección médica o sobre estas instrucciones, siempre pregunte a saenz profesional de bryanna. Healthwise, Incorporated niega toda garantía o responsabilidad por saenz uso de esta información. Introducing [facility]! [facility] introduces [employer] patient portal. Now you can access parts of your medical record, email your doctor's office, and request medication refills online. 1. In your internet browser, go to [email or web address]://Grows [email or web address] "[employer]/Grows Up 2. Click on the First Time User? Click Here link in the Sign In box. You will see the New Member Sign Up page. 3. Enter your [employer] Access Code exactly as it appears below. You will not need to use this code after youve completed the sign-up process. If you do not sign up before the expiration date, you must request a new code. · [employer] Access Code: [email or web address] Expires: 3/4/2018 11:03 AM 
 
4. Enter the last four digits of your Social Security Number (xxxx) and Date of Birth (mm/dd/yyyy) as indicated and click Submit. You will be taken to the next sign-up page. 5. Create a Nerdies ID. This will be your Nerdies login ID and cannot be changed, so think of one that is secure and easy to remember. 6. Create a Nerdies password. You can change your password at any time. 7. Enter your Password Reset Question and Answer. This can be used at a later time if you forget your password. 8. Enter your e-mail address. You will receive e-mail notification when new information is available in [address] 9. Click Sign Up. You can now view and download portions of your medical record. 10. Click the Download Summary menu link to download a portable copy of your medical information. If you have questions, please visit the Frequently Asked Questions section of the Nerdies website. Remember, Nerdies is NOT to be used for urgent needs. For medical emergencies, dial 911. Now available from your iPhone and Android! Please provide this summary of care documentation to your next provider. If you have any questions after today's visit, please call [phone].

## 2017-12-12 NOTE — PROGRESS NOTES
Chief Complaint   Patient presents with    Ultrasound     , dating ultrasound     1. Have you been to the ER, urgent care clinic since your last visit? Hospitalized since your last visit? No    2. Have you seen or consulted any other health care providers outside of the [address] [place] [address] since your last visit? Include any pap smears or colon screening. No    Patient denies any bleeding, cramping or leakage of fluid.

## 2017-12-13 LAB
C TRACH RRNA SPEC QL NAA+PROBE: NEGATIVE
HBSAG, EXTERNAL: NEGATIVE
N GONORRHOEA RRNA SPEC QL NAA+PROBE: NEGATIVE

## 2017-12-14 LAB
2ND TRIMESTER 4 SCREEN SERPL-IMP: NORMAL
2ND TRIMESTER 4 SCREEN SERPL-IMP: NORMAL
ABO GROUP BLD: NORMAL
AFP ADJ MOM SERPL: NORMAL
AFP SERPL-MCNC: 22.5 NG/ML
AGE AT DELIVERY: 26.7 YEARS
BACTERIA UR CULT: NO GROWTH
BLD GP AB SCN SERPL QL: NEGATIVE
COMMENTS,018272: NORMAL
ERYTHROCYTE [DISTWIDTH] IN BLOOD BY AUTOMATED COUNT: 14 % (ref 12.3–15.4)
FET TS 18 RISK FROM MAT AGE: NORMAL
FET TS 21 RISK FROM MAT AGE: 943
GA METHOD: NORMAL
GA: 14.3 WEEKS
GLUCOSE 1H P 50 G GLC PO SERPL-MCNC: 138 MG/DL (ref 65–139)
HBV SURFACE AG SERPL QL IA: NEGATIVE
HCG ADJ MOM SERPL: NORMAL
HCG SERPL-ACNC: NORMAL MIU/ML
HCT VFR BLD AUTO: 40 % (ref 34–46.6)
HGB BLD-MCNC: 12.9 G/DL (ref 11.1–15.9)
HIV 1+2 AB+HIV1 P24 AG SERPL QL IA: NON REACTIVE
IDDM PATIENT QL: NO
INHIBIN A ADJ MOM SERPL: NORMAL
INHIBIN A SERPL-MCNC: 212.57 PG/ML
MCH RBC QN AUTO: 27.5 PG (ref 26.6–33)
MCHC RBC AUTO-ENTMCNC: 32.3 G/DL (ref 31.5–35.7)
MCV RBC AUTO: 85 FL (ref 79–97)
MULTIPLE PREGNANCY: NO
NEURAL TUBE DEFECT RISK FETUS: NORMAL %
PLATELET # BLD AUTO: 289 X10E3/UL (ref 150–379)
RBC # BLD AUTO: 4.69 X10E6/UL (ref 3.77–5.28)
RESULTS, 017389: NORMAL
RH BLD: POSITIVE
RUBV IGG SERPL IA-ACNC: 8.77 INDEX
SPECIMEN STATUS REPORT, ROLRST: NORMAL
T PALLIDUM AB SER QL IA: NEGATIVE
TS 18 RISK FETUS: NORMAL
TS 21 RISK FETUS: NORMAL
U ESTRIOL ADJ MOM SERPL: NORMAL
U ESTRIOL SERPL-MCNC: 0.43 NG/ML
VZV IGG SER IA-ACNC: 650 INDEX
WBC # BLD AUTO: 6.9 X10E3/UL (ref 3.4–10.8)

## 2017-12-15 NOTE — PROGRESS NOTES
All PNL normal. Will have to re-draw AFP tetra screen since ultrasound has changed her dating to 14w3d instead of 16w1d. A positive, Ab screen neg, HepBsAg neg, G/C neg, HIV NR, T pal neg, Rubella/VZV immune. 1Hr  (lab range here  = 80% sensitivity. Increased to 90% if >129). Will either perform the 2hr 75gm or 3hr 100gm GTT +/- fasting glucose.    Normal CBC

## 2017-12-19 ENCOUNTER — TELEPHONE (OUTPATIENT)
Dept: FAMILY MEDICINE CLINIC | Age: 27
End: 2017-12-19

## 2017-12-19 NOTE — TELEPHONE ENCOUNTER
----- Message from Zana Hernandez sent at 12/19/2017  8:25 AM EST -----  Regarding: /Telephone  Pat from Client services at [facility] is inquiring a AFP report on the Patient. Best contact ([phone] option 2 and 3.  Pt best contact [phone]

## 2017-12-19 NOTE — TELEPHONE ENCOUNTER
Per call from Kelsie Garcias with [address] [place],    Need to know patient current gestational age  Also scheduled delivery of med's    Call [phone]    thanks

## 2017-12-21 ENCOUNTER — TELEPHONE (OUTPATIENT)
Dept: FAMILY MEDICINE CLINIC | Age: 27
End: 2017-12-21

## 2017-12-21 NOTE — TELEPHONE ENCOUNTER
Medication has arrived. Suzan Farmer, or Edita, do you know when you wanted the patient to start these injections and will you call the patient to inform them. I will keep the medication on the self until the patient comes for the first shot then the patient will have to bring it with her.      Thanks

## 2017-12-22 NOTE — TELEPHONE ENCOUNTER
Called pt and let her know her medication is here and she needs to come in at 16 weeks. She will call back and make appt.

## 2017-12-26 ENCOUNTER — TELEPHONE (OUTPATIENT)
Dept: FAMILY MEDICINE CLINIC | Age: 27
End: 2017-12-26

## 2017-12-26 NOTE — TELEPHONE ENCOUNTER
Called and spoke with patient giving her appointment information for her upcoming [address] appointment at [place] [address] at the [place] [facility] location.  The appointment is scheduled for Monday 1/8/18 at 8:45am. Patient verbalized understanding

## 2017-12-27 ENCOUNTER — CLINICAL SUPPORT (OUTPATIENT)
Dept: FAMILY MEDICINE CLINIC | Age: 27
End: 2017-12-27

## 2017-12-27 DIAGNOSIS — Z87.51 HISTORY OF PRETERM DELIVERY: Primary | ICD-10-CM

## 2017-12-27 RX ORDER — HYDROXYPROGESTERONE CAPROATE 250 MG/ML
250 INJECTION INTRAMUSCULAR ONCE
Qty: 1 VIAL | Refills: 0
Start: 2017-12-27 | End: 2017-12-27

## 2018-01-08 ENCOUNTER — HOSPITAL ENCOUNTER (OUTPATIENT)
Dept: PERINATAL CARE | Age: 28
Discharge: HOME | End: 2018-01-08
Attending: OBSTETRICS & GYNECOLOGY
Payer: SELF-PAY

## 2018-01-08 PROCEDURE — 76805 OB US >/= 14 WKS SNGL FETUS: CPT | Performed by: OBSTETRICS & GYNECOLOGY

## 2018-01-11 ENCOUNTER — ROUTINE PRENATAL (OUTPATIENT)
Dept: FAMILY MEDICINE CLINIC | Age: 28
End: 2018-01-11

## 2018-01-11 VITALS
SYSTOLIC BLOOD PRESSURE: 124 MMHG | HEART RATE: 85 BPM | DIASTOLIC BLOOD PRESSURE: 74 MMHG | OXYGEN SATURATION: 99 % | TEMPERATURE: 98.2 F | WEIGHT: 192 LBS | HEIGHT: 62 IN | RESPIRATION RATE: 17 BRPM | BODY MASS INDEX: 35.33 KG/M2

## 2018-01-11 DIAGNOSIS — Z64.1 MULTIPARITY: ICD-10-CM

## 2018-01-11 DIAGNOSIS — Z87.51 HISTORY OF PRETERM DELIVERY: ICD-10-CM

## 2018-01-11 DIAGNOSIS — Z87.898: ICD-10-CM

## 2018-01-11 DIAGNOSIS — Z34.82 ENCOUNTER FOR SUPERVISION OF OTHER NORMAL PREGNANCY, SECOND TRIMESTER: Primary | ICD-10-CM

## 2018-01-11 DIAGNOSIS — O99.210 OBESITY AFFECTING PREGNANCY, ANTEPARTUM: ICD-10-CM

## 2018-01-11 LAB
BILIRUB UR QL STRIP: NEGATIVE
GLUCOSE UR-MCNC: NEGATIVE MG/DL
KETONES P FAST UR STRIP-MCNC: NORMAL MG/DL
PH UR STRIP: 5.5 [PH] (ref 4.6–8)
PROT UR QL STRIP: NEGATIVE
SP GR UR STRIP: 1.02 (ref 1–1.03)
UA UROBILINOGEN AMB POC: NORMAL (ref 0.2–1)
URINALYSIS CLARITY POC: CLEAR
URINALYSIS COLOR POC: YELLOW
URINE BLOOD POC: NORMAL
URINE LEUKOCYTES POC: NEGATIVE
URINE NITRITES POC: NEGATIVE

## 2018-01-11 RX ORDER — HYDROXYPROGESTERONE CAPROATE 250 MG/ML
250 INJECTION INTRAMUSCULAR ONCE
Qty: 1 VIAL | Refills: 0 | Status: SHIPPED | COMMUNITY
Start: 2018-01-11 | End: 2018-01-11

## 2018-01-11 NOTE — PATIENT INSTRUCTIONS
Learning About Pregnancy and Obesity  How does your weight affect your pregnancy? Most pregnant women-no matter what their size-have healthy babies. And the basics of care are the same for all women. You'll get the same number of doctor visits and the same types of tests as any other pregnant woman. You'll get what you need to have a healthy baby. But your size can make a difference in a few things. You and your doctor will have to watch your pregnancy weight. You'll need to pay close attention to things like blood pressure and the chance of getting gestational diabetes. (Gestational diabetes is a type of diabetes that some women get during pregnancy.) The same close attention will be given to your developing baby. Your weight may also affect your labor and delivery. Work with your doctor to get the care you need. Go to all your doctor visits, and follow your doctor's advice about what to do and what to avoid during pregnancy. What should you know about weight loss and weight gain? · Pregnancy is not the time to lose weight. Your baby needs for you to eat a well-rounded diet. Don't cut out food groups or go on any type of weight-loss diet. · Experts recommend gaining between 11 and 20 pounds. Your doctor will work with you to set a weight goal that's right for you. It's possible that your doctor may recommend that you not gain any weight. · Although pregnant women often joke that they're \"eating for two,\" you don't need to eat twice as much food. In general, pregnant women need to eat about 300 extra calories a day. You can get this in a sandwich or in an apple and a cup of yogurt. What can you do to have a healthy pregnancy? The best things you can do for you and your baby are to eat healthy foods, get regular exercise, avoid alcohol and smoking, and go to your doctor visits. · Eat a variety of foods from all the food groups. Make sure to get enough calcium and folic acid.   · You may want to work with a dietitian to help you plan healthy meals to get the right amount of calories for you. · If you didn't exercise much before you got pregnant, talk to your doctor about how you can slowly get more active. Your doctor may want to set up an exercise program with you. Where can you learn more? Go to http://abelardo-james.info/. Enter F755 in the search box to learn more about \"Learning About Pregnancy and Obesity. \"  Current as of: March 16, 2017  Content Version: 11.4  © 2027-1731 [place]. Care instructions adapted under license by [employer] (which disclaims liability or warranty for this information). If you have questions about a medical condition or this instruction, always ask your healthcare professional. [address] any warranty or liability for your use of this information. Aprenda sobre el Learta Idol y la obesidad - [ Learning About Pregnancy and Obesity ]  ¿Qué efecto tiene saenz peso en saenz Learta Idol? La mayoría de las mujeres Alexis de Camaces, independientemente de saenz tamaño, tienen bebés saludables. Y los conceptos básicos de la atención médica son los mismos para todas las mujeres. Usted tendrá la misma cantidad de visitas médicas y los mismos tipos de exámenes que cualquier otra chanda Alexis de Cam[place]. Recibirá lo que necesita para tener un bebé saludable. Ailyn saenz tamaño puede marcar lennox diferencia en algunas cosas. Usted y saenz médico tendrán que vigilar saenz peso ayaz el Learta Idol. Tendrá que prestar mucha atención a cosas rajat la presión arterial y la posibilidad de tener diabetes gestacional. (La diabetes gestacional es un tipo de diabetes que algunas mujeres tienen ayaz el Learta Idol). Se le dará la misma atención especial a saenz bebé en desarrollo. Saenz peso también puede afectar al Maria Isabel Dawson de parto y al parto. Colabore con saenz médico para recibir el cuidado que necesita.  Elta Chi a todas las visitas médicas y Cosme Abadfin consejos de [place] médico acerca de lo que debe hacer y lo que debe evitar ayaz el embarazo. ¿Qué debería saber acerca de adelgazar y engordar? · El embarazo no es el momento de adelgazar. Burch bebé necesita que usted tenga lennox alimentación equilibrada. No elimine grupos de alimentos ni chan ningún tipo de dieta para adelgazar. · Los expertos recomiendan lennox subida de peso de entre 11 y 21 libras (5 y 9 kilos). Burch médico colaborará con usted para fijar lennox meta de peso que sea [place] para usted. Es posible que el [employer]count.[employer] recomiende no aumentar de [place]. · Aunque con frecuencia las mujeres embarazadas bromean acerca de \"comer para dos\", usted no necesita comer el doble de comida. Por lo general, las mujeres embarazadas necesitan comer unas 300 calorías adicionales al día. Usted puede hacerlo por medio de un sándwich o con Marzetta Pica y Patricia Falcon taza de yogur. ¿Qué puede hacer para tener un embarazo saludable? Las mejores cosas que puede hacer por usted y por burch bebé son alimentarse de manera saludable, hacer ejercicio con regularidad, evitar el tabaco y el alcohol y acudir a las visitas médicas. · Coma lennox variedad de alimentos de cada dorothy de los grupos de alimentos. Asegúrese de consumir suficiente calcio y ácido fólico.  · Dashawn vez desee colaborar con un dietista que la ayude a planificar comidas saludables para que ingiera la cantidad correcta de calorías para usted. · Si no hacía mucho ejercicio antes de quedar embarazada, hable con burch médico acerca de cómo puede incrementar la actividad lentamente. Es posible que burch médico desee establecer un programa de ejercicio junto con usted. ¿Dónde puede encontrar más información en inglés? Tg Brand a http://abelardo-james.info/. Escriba B644 en la búsqueda para aprender más acerca de \"Aprenda sobre el [place] y la obesidad - [ Learning About Pregnancy and Obesity ]. \"  Revisado: 16 marzo, 2017  Versión del contenido: 11.4  © 2368-0690 Healthwise, Incorporated.  Louie Abraham instrucciones de cuidado fueron adaptadas bajo licencia por Good Help Connections (which disclaims liability or warranty for this information). Si usted tiene [place] afección médica o sobre estas instrucciones, siempre pregunte a saenz profesional de bryanna. [facility], Incorporated niega toda garantía o responsabilidad por saenz uso de esta información. Semanas 18 a 22 de saenz embarazo: Instrucciones de cuidado - [ Zoya Sims 18 to 22 of Your Pregnancy: Care Instructions ]  Instrucciones de cuidado    Saenz bebé continúa desarrollándose rápidamente. En esta etapa, los bebés ya pueden chuparse el pulgar, agarrar firmemente con las [place], y abrir y cerrar los párpados. En algún Ramirez's 18 y 25, comenzará a sentir que el bebé se [place]. Al principio, estos pequeños movimientos se sentirán rajat un aleteo o un vuelo de mariposas. Algunas mujeres dicen que sienten rajat burbujas de [place]. A medida que el bebé crece, estos movimientos serán más rodney. También podría observar que saenz bebé patea y tiene hipo. Ayaz stefan Kathy, podría descubrir que las náuseas y la fatiga desaparecieron. En general, es posible que se sienta mejor y tenga más energía que la que tenía ayaz el primer trimestre. Sin embargo, también podría tener nuevas [place]OVER, rajat problemas para dormir o calambres en las piernas. Esta hoja de cuidados la ayudará a aliviar esas molestias. La atención de seguimiento es lennox parte clave de saenz tratamiento y seguridad. Asegúrese de hacer y acudir a todas las citas, y llame a saenz médico si está teniendo problemas. También es lennox buena idea saber los resultados de los exámenes y mantener lennox lista de los medicamentos que roxana. ¿Cómo puede cuidarse en el hogar? Alivie los problemas para dormir  · Evite la cafeína en las bebidas o los chocolates a última hora del día. · Duc algo de ejercicio todos los días. · Dúchese o báñese en agua tibia antes de irse a la cama.   · Coma un refrigerio Tito Aschoff un vaso de leche a la hora de dormir. · Duc ejercicios de relajación en la cama para tranquilizar saenz mente y saenz cuerpo. · Apoye herber piernas y saenz espalda con almohadas adicionales. Si duerme de costado, pruebe a ponerse lennox [employer] herber piernas. · No use píldoras para dormir ni consuma alcohol. Podrían hacerle daño a saenz bebé. Alivie los calambres en las piernas  · No masajee saenz pantorrilla mientras tiene un calambre. · Siéntese en lennox cama o silla firme. Estire la robin y [employer] ([facility] despacio, [facility], en dirección a la rodilla. Doble los dedos de los pies hacia arriba y [place]. · Póngase de pie sobre lennox superficie plana y fresca. Estire los dedos de los pies hacia arriba y dé pequeños pasos con el talón. · Use lennox almohadilla térmica o lennox bolsa de [place] para aliviar christiana musculares. Prevenga los calambres en las piernas  · Asegúrese de consumir suficiente calcio. Si está preocupada porque no está obteniendo lo suficiente, hable con saenz médico.  · Duc ejercicio todos los bernie y estire las piernas antes de irse a dormir. · Dese un baño tibio antes de irse a dormir y pruebe a usar calentadores de piernas. ¿Dónde puede encontrar más información en inglés? Shirley Jj a http://cassie.info/. Susan Drop U554 en la búsqueda para aprender más acerca de \"Semanas 18 a 22 de saenz embarazo: Instrucciones de cuidado - [ Malik Callejas 18 to 22 of Your Pregnancy: Care Instructions ]. \"  Revisado: 16 marzo, 2017  Versión del contenido: 11.4  © 1154-4921 Healthwise, Incorporated. Las instrucciones de cuidado fueron adaptadas bajo licencia por Good Help Connections (which disclaims liability or warranty for this information). Si usted tiene [place] afección médica o sobre estas instrucciones, siempre pregunte a saenz profesional de bryanna. Healthwise, Incorporated niega toda garantía o responsabilidad por saenz uso de esta información.

## 2018-01-11 NOTE — PROGRESS NOTES
Return OB Visit       Subjective:   Pipe Rachel 33 y.o. G5   STEPHANIE: 2018, by Ultrasound  GA:  18w5d. [id number]  Preg 1:  (8month) - 5lbs, 7 yo girl, PTL   Preg 2: term 7lbs, 11 yo girl, no complications  Preg 3: triplet -> placenta problem - d&c  Preg 4: term, 6lbs, 3 yo boy, no complications    Fetal movement - yes  Vaginal bleeding - no   Vaginal discharge - no  LOF - no    Diet - eating healthy   Exercise - she walks   PNV/Other supplements - yes    Allergies- reviewed:   No Known Allergies  Medications- reviewed:   Current Outpatient Prescriptions   Medication Sig    HYDROXYprogesterone caproate 250 mg/mL oil injection 250 mg by IntraMUSCular route once for 1 dose.  prenatal vit-iron fumarate-fa 27 mg iron- 0.8 mg tab tablet Take 1 Tab by mouth daily. No current facility-administered medications for this visit. Past Medical History- reviewed:  History reviewed. No pertinent past medical history. Past Surgical History- reviewed:   History reviewed. No pertinent surgical history. Social History- reviewed:  Social History     Social History    Marital status: UNKNOWN     Spouse name: N/A    Number of children: N/A    Years of education: N/A     Occupational History    Not on file.      Social History Main Topics    Smoking status: Never Smoker    Smokeless tobacco: Never Used    Alcohol use No    Drug use: No    Sexual activity: Yes     Partners: Male     Other Topics Concern    Not on file     Social History Narrative     Immunizations- reviewed:   Immunization History   Administered Date(s) Administered    Influenza Vaccine (Quad) PF 2017         Objective:     Visit Vitals    /74    Pulse 85    Temp 98.2 °F (36.8 °C) (Oral)    Resp 17    Ht 5' 2\" (1.575 m)    Wt 192 lb (87.1 kg)    LMP 2017 (Exact Date)    SpO2 99%    BMI 35.12 kg/m2       Physical Exam:  GENERAL APPEARANCE: alert, well appearing, in no apparent distress  LUNGS: clear to auscultation, no wheezes, rales or rhonchi, symmetric air entry  HEART: regular rate and rhythm, no murmurs  ABDOMEN: soft, nontender, nondistended, no abnormal masses, no epigastric pain, bowel sounds present, fundal height 21 cm, FHT present at 150 bpm  BACK: no CVA tenderness  UTERUS: gravid  EXTREMITIES: no redness or tenderness in the calves or thighs, no edema  NEUROLOGICAL: alert, oriented, normal speech, no focal findings or movement disorder noted      Labs  Recent Results (from the past 12 hour(s))   AMB POC URINALYSIS DIP STICK AUTO W/O MICRO    Collection Time: 18 10:16 AM   Result Value Ref Range    Color (UA POC) Yellow     Clarity (UA POC) Clear     Glucose (UA POC) Negative Negative    Bilirubin (UA POC) Negative Negative    Ketones (UA POC) Trace Negative    Specific gravity (UA POC) 1.025 1.001 - 1.035    Blood (UA POC) Trace Negative    pH (UA POC) 5.5 4.6 - 8.0    Protein (UA POC) Negative Negative    Urobilinogen (UA POC) 0.2 mg/dL 0.2 - 1    Nitrites (UA POC) Negative Negative    Leukocyte esterase (UA POC) Negative Negative         Assessment   Beckie Boxer 33 y.o. G5   STEPHANIE: 2018, by Ultrasound  GA:  18w5d. ICD-10-CM ICD-9-CM    1. Encounter for supervision of other normal pregnancy, second trimester Z34.82 V22.1 AMB POC URINALYSIS DIP STICK AUTO W/O MICRO      AFP TETRA SCREEN, OBSTETRICAL   2. Hx of triplet pregnancy in prior pregnancy Z87.59 V13.29    3. Multiparity Z64.1 V61.5    4. History of  delivery Z87.51 V13.21 THER/PROPH/DIAG INJECTION, SUBCUT/IM      HYDROXYprogesterone caproate 250 mg/mL oil injection   5.  Obesity affecting pregnancy, antepartum O99.210 649.13          Plan     Orders Placed This Encounter    THER/PROPH/DIAG INJECTION, SUBCUT/IM    AFP TETRA SCREEN, OBSTETRICAL     Order Specific Question:   Patient Height     Answer:   5     Order Specific Question:   Patient Weight     Answer:   192    AMB POC URINALYSIS DIP STICK AUTO W/O MICRO  HYDROXYprogesterone caproate 250 mg/mL oil injection     Si mg by IntraMUSCular route once for 1 dose. Dispense:  1 Vial     Refill:  0       IUP:  - Prenatal Labs: A positive, Ab screen neg, HepBsAg neg, G/C neg, HIV NR, T pal neg, Rubella/VZV immune. 1Hr    - GBS due at 35-37 weeks  - Tdap due at 26-28 weeks   - Ultrasound: Ultrasound changed dating from LMP to 18w2d on 18. Normal fetal anatomy   - Will have to re-draw AFP tetra screen as it was done before 15 weeks. Pregnancy complicated by:  Hx of  delivery with 2 subsequent term deliveries - weekly [place]. Last injection was 2 weeks ago. Will give one shot today and schedule the next 2 nurse visits. Obesity- lifestyle modifications recommended with diet and exercise. Follow up: RTC in 4 weeks for prenatal care. She will need to call back to make appointment. Labor precautions discussed, including: Regular painful contractions, lasting for greater than one hour, taking your breath away; any vaginal bleeding; any leakage of fluid; or absent or decreased fetal movement. Call M.D. on call if any of these symptoms or signs occur. I have discussed the diagnosis with the patient and the intended plan as seen in the above orders. The patient has received an after-visit summary and questions were answered concerning future plans. I have discussed medication side effects and warnings with the patient as well. Informed pt to return to the office or go to the ER if she experiences vaginal bleeding, vaginal discharge, leaking of fluid, pelvic cramping.     Pt seen and discussed with Dr. Moira Carbajal (attending physician)      Arely Joseph MD  Family Medicine Resident, PGY-1

## 2018-01-11 NOTE — MR AVS SNAPSHOT
Visit Information Anna Panda Personal Médico Departamento Teléfono del Dep. Número de visita 2018 10:00 AM Jose Romero, Mariposa Munoz [phone] [phone] Follow-up Instructions Return in about 4 weeks (around 2018). Upcoming Health Maintenance Date Due  
 HPV AGE 9Y-34Y (1 of 3 - Female 3 Dose Series) 2002 DTaP/Tdap/Td series (1 - Tdap) 2012 PAP AKA CERVICAL CYTOLOGY 2012 Alergias  Review Complete El: 2018 Por: Karen Baldwin LPN A partir del:  2018 No Known Allergies Vacunas actuales CQTMODELO el:  2017 Yousif Kelley Influenza Vaccine (Quad) PF 2017 No revisadas esta visita You Were Diagnosed With   
  
 [place] Encounter for supervision of other normal pregnancy, second trimester    -  Primary ICD-10-CM: Z34.82 
ICD-9-CM: V22.1 Hx of triplet pregnancy in prior pregnancy     ICD-10-CM: Z87.59 
ICD-9-CM: V13.29 Multiparity     ICD-10-CM: Z64.1 ICD-9-CM: V61.5 History of  delivery     ICD-10-CM: Z87.51 
ICD-9-CM: V13.21 Obesity affecting pregnancy, antepartum     ICD-10-CM: O99.210 ICD-9-CM: 649.13 Partes vitales PS Pulso Temperatura Resp [place] ( percentil de crecimiento) Peso (percentil de crecimiento) 124/74 85 98.2 °F (36.8 °C) (Oral) 17 5' 2\" (1.575 m) 192 lb (87.1 kg) LMP (última lizzie) SpO2 BMI (IMC) Estado obstétrico Estatus de tabaquísmo 2017 (Exact Date) 99% 35.12 kg/m2 Pregnant Never Smoker Historial de signos vitales BMI and BSA Data Body Mass Index Body Surface Area  
 35.12 kg/m 2 1.95 m 2 Preferred Pharmacy Pharmacy Name Phone 500 [address], [address] [place] [address] [phone] Burch lista de medicamentos actualizada Lista actualizada el: 18 11:16 AM.  Syd Keep use burch lista de medicamentos más reciente. HYDROXYprogesterone caproate 250 mg/mL Oil injection 250 mg by IntraMUSCular route once for 1 dose. prenatal vit-iron fumarate-fa 27 mg iron- 0.8 mg Tab tablet Take 1 Tab by mouth daily. Hicimos lo siguiente AFP TETRA SCREEN, OBSTETRICAL [id number] CPT(R)] AMB POC URINALYSIS DIP STICK AUTO W/O MICRO [15124 CPT(R)] THER/PROPH/DIAG INJECTION, SUBCUT/IM [id number] CPT(R)] Instrucciones de seguimiento Return in about 4 weeks (around 2/8/2018). Instrucciones para el Paciente Learning About Pregnancy and Obesity How does your weight affect your pregnancy? Most pregnant women-no matter what their size-have healthy babies. And the basics of care are the same for all women. You'll get the same number of doctor visits and the same types of tests as any other pregnant woman. You'll get what you need to have a healthy baby. But your size can make a difference in a few things. You and your doctor will have to watch your pregnancy weight. You'll need to pay close attention to things like blood pressure and the chance of getting gestational diabetes. (Gestational diabetes is a type of diabetes that some women get during pregnancy.) The same close attention will be given to your developing baby. Your weight may also affect your labor and delivery. Work with your doctor to get the care you need. Go to all your doctor visits, and follow your doctor's advice about what to do and what to avoid during pregnancy. What should you know about weight loss and weight gain? · Pregnancy is not the time to lose weight. Your baby needs for you to eat a well-rounded diet. Don't cut out food groups or go on any type of weight-loss diet. · Experts recommend gaining between 11 and 20 pounds. Your doctor will work with you to set a weight goal that's right for you. It's possible that your doctor may recommend that you not gain any weight. · Although pregnant women often joke that they're \"eating for two,\" you don't need to eat twice as much food. In general, pregnant women need to eat about 300 extra calories a day. You can get this in a sandwich or in an apple and a cup of yogurt. What can you do to have a healthy pregnancy? The best things you can do for you and your baby are to eat healthy foods, get regular exercise, avoid alcohol and smoking, and go to your doctor visits. · Eat a variety of foods from all the food groups. Make sure to get enough calcium and folic acid. · You may want to work with a dietitian to help you plan healthy meals to get the right amount of calories for you. · If you didn't exercise much before you got pregnant, talk to your doctor about how you can slowly get more active. Your doctor may want to set up an exercise program with you. Where can you learn more? Go to http://abelardoPricePandajames.info/. Enter N775 in the search box to learn more about \"Learning About Pregnancy and Obesity. \" Current as of: March 16, 2017 Content Version: 11.4 © 8588-7904 [employer]. Care instructions adapted under license by [employer] (which disclaims liability or warranty for this information). If you have questions about a medical condition or this instruction, always ask your healthcare professional. [address] any warranty or liability for your use of this information. Aprenda sobre el [place] y la obesidad - [ Learning About Pregnancy and Obesity ] Glennie Ferns tiene saenz peso en saenz [employer]? La mayoría de las mujeres Alexis de Cam[place], independientemente de saenz tamaño, tienen bebés saludables. Y los conceptos básicos de la atención médica son los mismos para todas las mujeres. Usted tendrá la misma cantidad de visitas médicas y los mismos tipos de exámenes que cualquier otra chanda Alexis [place]. Recibirá lo que necesita para tener un bebé saludable. Ailyn saenz tamaño puede marcar lennox diferencia en algunas cosas. Usted y saenz médico tendrán que vigilar saenz peso ayaz el [facility]. Tendrá que prestar mucha atención a cosas rajat la presión arterial y la posibilidad de tener diabetes gestacional. (La diabetes gestacional es un tipo de diabetes que algunas mujeres tienen ayaz el [facility]). Se le dará la misma atención especial a saenz bebé en desarrollo. Saenz peso también puede afectar al Lukas Holster de parto y al parto. Colabore con saenz médico para recibir el cuidado que necesita. Swapna Leader a todas las visitas médicas y siga los consejos de saenz médico acerca de lo que debe hacer y lo que debe evitar ayaz el embarazo. Dennis Feast saber acerca de adelgazar y engordar? · El embarazo no es el momento de adelgazar. Saenz bebé necesita que usted tenga lennox alimentación equilibrada. No elimine grupos de alimentos ni chan ningún tipo de dieta para adelgazar. · Los expertos recomiendan lennox subida de peso de entre 11 y 21 libras (5 y 9 kilos). Saenz médico colaborará con usted para fijar lennox meta de peso que sea [place] para usted. Es posible que el [employer] recomiende no aumentar de [place]. · Aunque con frecuencia las mujeres embarazadas bromean acerca de \"comer para dos\", usted no necesita comer el doble de comida. Por lo general, las mujeres embarazadas necesitan comer unas 300 calorías adicionales al día. Usted puede hacerlo por medio de un sándwich o con Guinevere Boxer y Jennifer Lori taza de yogur. Qué puede hacer para tener un embarazo saludable? Las mejores cosas que puede hacer por usted y por saenz bebé son alimentarse de manera saludable, hacer ejercicio con regularidad, evitar el tabaco y el alcohol y acudir a las visitas médicas. · Coma lennox variedad de alimentos de cada dorothy de los grupos de alimentos.  Asegúrese de consumir suficiente calcio y ácido fólico. 
· Dashawn vez desee colaborar con un dietista que la ayude a planificar comidas saludables para que ingiera la cantidad correcta de calorías para usted. · Si no hacía mucho ejercicio antes de quedar embarazada, hable con saenz médico acerca de cómo puede incrementar la actividad lentamente. Es posible que saenz médico desee establecer un programa de ejercicio junto con usted. Dónde puede encontrar más información en inglés? Jenae Begin a http://abelardo-james.info/. Escriba B644 en la búsqueda para aprender más acerca de \"Aprenda sobre el [place] y la obesidad - [ Learning About Pregnancy and Obesity ]. \" 
Revisado: 16 marzo, 2017 Versión del contenido: 11.4 © 7989-6670 Healthwise, Incorporated. Las instrucciones de cuidado fueron adaptadas bajo licencia por [employer] (which disclaims liability or warranty for this information). Si usted tiene [place] afección médica o sobre estas instrucciones, siempre pregunte a saenz profesional de bryanna. Healthwise, Incorporated niega toda garantía o responsabilidad por saenz uso de esta información. Semanas 18 a 22 de saenz embarazo: Instrucciones de cuidado - [ [place] 18 to 22 of Your Pregnancy: Care Instructions ] Instrucciones de cuidado Saenz bebé continúa desarrollándose rápidamente. En esta etapa, los bebés ya pueden chuparse el pulgar, agarrar firmemente con las [address], y abrir y cerrar los párpados. En algún Ramirez's 18 y 25, comenzará a sentir que el bebé se [place]. Al principio, estos pequeños movimientos se sentirán rajat un aleteo o un vuelo de mariposas. Algunas mujeres dicen que sienten rajat burbujas de [place]. A medida que el bebé crece, estos movimientos serán más rodney. También podría observar que saenz bebé patea y tiene hipo. Ayaz stefan Kathy, podría descubrir que las náuseas y la fatiga desaparecieron. En general, es posible que se sienta mejor y tenga más energía que la que tenía ayaz el primer trimestre.  Sin embargo, [facility] podría tener nuevas molestias, rajat problemas para dormir o calambres en las piernas. Esta hoja de cuidados la ayudará a aliviar esas molestias. La atención de seguimiento es lennox parte clave de saenz tratamiento y seguridad. Asegúrese de hacer y acudir a todas las citas, y llame a saenz médico si está teniendo problemas. También es lennox buena idea saber los resultados de los exámenes y mantener lennox lista de los medicamentos que roxana. Cómo puede cuidarse en el hogar? Alivie los problemas para dormir · Evite la cafeína en las bebidas o los chocolates a última hora del día. · Duc algo de ejercicio todos los días. · Dúchese o báñese en agua tibia antes de irse a la cama. · Coma un refrigerio liviano o selma un vaso de leche a la hora de dormir. · Duc ejercicios de relajación en la cama para tranquilizar saenz mente y saenz cuerpo. · Apoye herber piernas y saenz espalda con almohadas adicionales. Si duerme de costado, pruebe a ponerse lennox [employer] herber piernas. · No use píldoras para dormir ni consuma alcohol. Podrían hacerle daño a saenz bebé. Alivie los [employer] piernas · No masajee saenz pantorrilla mientras tiene un calambre. · Siéntese en lennox cama o silla firme. Estire la [place] y [place] ([facility] el [facility]) despacio, [facility], en dirección a la rodilla. Doble los dedos de los pies hacia arriba y [place]. · Póngase de pie sobre lennox superficie plana y fresca. Estire los dedos de los pies hacia arriba y dé pequeños pasos con el talón. · Use lennox almohadilla térmica o lennox bolsa de [place] para aliviar christiana musculares. Prevenga los calambres en las piernas · Asegúrese de consumir suficiente calcio. Si está preocupada porque no está obteniendo lo suficiente, hable con saenz médico. 
· Duc ejercicio todos los bernie y estire las piernas antes de irse a dormir. · Dese un baño tibio antes de irse a dormir y pruebe a usar calentadores de piernas. Dónde puede encontrar más información en inglés? Shalini Culver a http://cassie.info/. Josue Harrellw A179 en la búsqueda para aprender más acerca de \"Semanas 18 a 22 de saenz embarazo: Instrucciones de cuidado - [ Albino Evener 18 to 22 of Your Pregnancy: Care Instructions ]. \" 
Revisado: 16 marzo, 2017 Versión del contenido: 11.4 © 1272-8548 Healthwise, Incorporated. Las instrucciones de cuidado fueron adaptadas bajo licencia por [employer] (which disclaims liability or warranty for this information). Si usted tiene [place] afección médica o sobre estas instrucciones, siempre pregunte a saenz profesional de bryanna. Healthwise, Incorporated niega toda garantía o responsabilidad por saenz uso de esta información. Introducing [facility]! [facility] introduces Plizy patient portal. Now you can access parts of your medical record, email your doctor's office, and request medication refills online. 1. In your internet browser, go to [email or web address] 2. Click on the First Time User? Click Here link in the Sign In box. You will see the New Member Sign Up page. 3. Enter your Plizy Access Code exactly as it appears below. You will not need to use this code after youve completed the sign-up process. If you do not sign up before the expiration date, you must request a new code. · Plizy Access Code: [email or web address] Expires: 3/4/2018 11:03 AM 
 
4. Enter the last four digits of your Social Security Number (xxxx) and Date of Birth (mm/dd/yyyy) as indicated and click Submit. You will be taken to the next sign-up page. 5. Create a [employer]t ID. This will be your Plizy login ID and cannot be changed, so think of one that is secure and easy to remember. 6. Create a Plizy password. You can change your password at any time. 7. Enter your Password Reset Question and Answer. This can be used at a later time if you forget your password. 8. Enter your e-mail address.  You will receive e-mail notification when new information is available in algrano. 9. Click Sign Up. You can now view and download portions of your medical record. 10. Click the Download Summary menu link to download a portable copy of your medical information. If you have questions, please visit the Frequently Asked Questions section of the algrano website. Remember, algrano is NOT to be used for urgent needs. For medical emergencies, dial 911. Now available from your iPhone and Android! Please provide this summary of care documentation to your next provider. Your primary care clinician is listed as Frankie Reis. If you have any questions after today's visit, please call [phone].

## 2018-01-11 NOTE — PROGRESS NOTES
Chief Complaint   Patient presents with    Routine Prenatal Visit      20w & 4d     1. Have you been to the ER, urgent care clinic since your last visit? Hospitalized since your last visit? No    2. Have you seen or consulted any other health care providers outside of the [address] [place] [address] since your last visit? Include any pap smears or colon screening. No     Patient denies any bleeding, cramping or leakage of fluid.

## 2018-01-16 LAB
2ND TRIMESTER 4 SCREEN SERPL-IMP: NORMAL
2ND TRIMESTER 4 SCREEN SERPL-IMP: NORMAL
AFP ADJ MOM SERPL: 1.24
AFP SERPL-MCNC: 48.8 NG/ML
AGE AT DELIVERY: 26.7 YEARS
COMMENTS,018272: NORMAL
FET TS 18 RISK FROM MAT AGE: NORMAL
FET TS 21 RISK FROM MAT AGE: 943
GA METHOD: NORMAL
GA: 18.5 WEEKS
HCG ADJ MOM SERPL: 0.64
HCG SERPL-ACNC: NORMAL MIU/ML
IDDM PATIENT QL: NO
INHIBIN A ADJ MOM SERPL: 1.03
INHIBIN A SERPL-MCNC: 158.46 PG/ML
MULTIPLE PREGNANCY: NO
NEURAL TUBE DEFECT RISK FETUS: 5748 %
RESULTS, 017389: NORMAL
TS 18 RISK FETUS: NORMAL
TS 21 RISK FETUS: NORMAL
U ESTRIOL ADJ MOM SERPL: 1
U ESTRIOL SERPL-MCNC: 1.37 NG/ML

## 2018-01-16 NOTE — PROGRESS NOTES
AFP tetra screen reviewed, negative screening result. Follow up as scheduled--will need to be seen around 2/11/18. Schedule not available to make appt this far in advance.

## 2018-01-18 ENCOUNTER — CLINICAL SUPPORT (OUTPATIENT)
Dept: FAMILY MEDICINE CLINIC | Age: 28
End: 2018-01-18

## 2018-01-18 DIAGNOSIS — O09.899 HX OF PRETERM DELIVERY, CURRENTLY PREGNANT: Primary | ICD-10-CM

## 2018-01-18 RX ORDER — HYDROXYPROGESTERONE CAPROATE 250 MG/ML
250 INJECTION INTRAMUSCULAR ONCE
Qty: 1 VIAL | Refills: 0 | Status: SHIPPED | COMMUNITY
Start: 2018-01-18 | End: 2018-01-18

## 2018-01-25 ENCOUNTER — CLINICAL SUPPORT (OUTPATIENT)
Dept: FAMILY MEDICINE CLINIC | Age: 28
End: 2018-01-25

## 2018-01-25 DIAGNOSIS — Z87.51 HISTORY OF PRETERM DELIVERY: Primary | ICD-10-CM

## 2018-01-25 RX ORDER — HYDROXYPROGESTERONE CAPROATE 250 MG/ML
250 INJECTION INTRAMUSCULAR ONCE
Qty: 1 VIAL | Refills: 0 | Status: SHIPPED | COMMUNITY
Start: 2018-01-25 | End: 2018-01-25

## 2018-01-30 NOTE — PROGRESS NOTES
I saw and evaluated the patient, performing the key elements of the service. I discussed the findings, assessment and plan with the resident and agree with the resident's findings and plan as documented in the resident's note. 28yo [id number] @ 18.5 by 14.3 bernice  1. IUP: IOB labs reviewed, RH pos, AFP tetra neg  2. Hx PTD: on [place] (missed last weeks injection, counseled extensively today and informed pt if she continues to miss doses we will have to discontinue the administration, she agrees to come weekly), cervical length with MFM Qmonth, last 1/8 3.1cm normal   3.   Obesity: early GTT WNL (barely), repeat screen 24-28 weeks

## 2018-02-01 ENCOUNTER — CLINICAL SUPPORT (OUTPATIENT)
Dept: FAMILY MEDICINE CLINIC | Age: 28
End: 2018-02-01

## 2018-02-01 DIAGNOSIS — Z87.51 HISTORY OF PRE-TERM LABOR: Primary | ICD-10-CM

## 2018-02-01 DIAGNOSIS — Z87.51 HISTORY OF PRETERM DELIVERY: ICD-10-CM

## 2018-02-01 RX ORDER — HYDROXYPROGESTERONE CAPROATE 250 MG/ML
250 INJECTION INTRAMUSCULAR ONCE
Qty: 1 VIAL | Refills: 0
Start: 2018-02-01 | End: 2018-02-01

## 2018-02-08 ENCOUNTER — ROUTINE PRENATAL (OUTPATIENT)
Dept: FAMILY MEDICINE CLINIC | Age: 28
End: 2018-02-08

## 2018-02-08 VITALS
HEIGHT: 62 IN | OXYGEN SATURATION: 98 % | SYSTOLIC BLOOD PRESSURE: 111 MMHG | BODY MASS INDEX: 36.25 KG/M2 | WEIGHT: 197 LBS | TEMPERATURE: 96.9 F | HEART RATE: 84 BPM | RESPIRATION RATE: 16 BRPM | DIASTOLIC BLOOD PRESSURE: 65 MMHG

## 2018-02-08 DIAGNOSIS — Z87.51 HISTORY OF PRETERM DELIVERY: ICD-10-CM

## 2018-02-08 DIAGNOSIS — Z34.92 PRENATAL CARE IN SECOND TRIMESTER: Primary | ICD-10-CM

## 2018-02-08 LAB
BILIRUB UR QL STRIP: NORMAL
GLUCOSE UR-MCNC: NEGATIVE MG/DL
KETONES P FAST UR STRIP-MCNC: NORMAL MG/DL
PH UR STRIP: 5.5 [PH] (ref 4.6–8)
PROT UR QL STRIP: NORMAL
SP GR UR STRIP: 1.02 (ref 1–1.03)
UA UROBILINOGEN AMB POC: NORMAL (ref 0.2–1)
URINALYSIS CLARITY POC: CLEAR
URINALYSIS COLOR POC: NORMAL
URINE BLOOD POC: NORMAL
URINE LEUKOCYTES POC: NORMAL
URINE NITRITES POC: NEGATIVE

## 2018-02-08 RX ORDER — HYDROXYPROGESTERONE CAPROATE 250 MG/ML
250 INJECTION INTRAMUSCULAR ONCE
Qty: 1 VIAL | Refills: 0 | Status: SHIPPED | COMMUNITY
Start: 2018-02-08 | End: 2018-02-08

## 2018-02-08 NOTE — PROGRESS NOTES
Return OB Visit       Subjective:   Caterina Chappell 33 y.o. G5   STEPHANIE: 2018, by Ultrasound GA: 22w5d. She has friend here to translate per her request. Declines  services. She is taking PNV. She is eating a balanced, healthy diet.      She is feeling her baby move and denies vaginal bleeding, discharge, loss of fluid, or contractions. She denies nausea, vomiting, severe abdominal pain or cramping, dysuria, headaches, dizziness, vision changes, excessive swelling of extremities. Concerns today: None    Allergies- reviewed:   No Known Allergies    Medications- reviewed:   Current Outpatient Prescriptions   Medication Sig    prenatal vit-iron fumarate-fa 27 mg iron- 0.8 mg tab tablet Take 1 Tab by mouth daily. No current facility-administered medications for this visit. Past Medical History- reviewed:  No past medical history on file.     OB History    Para Term  AB Living   5 3 2 1 1 3   SAB TAB Ectopic Molar Multiple Live Births    1    0      # Outcome Date GA Lbr Giovany/2nd Weight Sex Delivery Anes PTL Lv   5 Current            4 Term    6 lb (2.722 kg)  Vag-Spont      3 TAB      TAB         Complications: Triplet gestation,S/P D&C (status post dilation and curettage)   2 Term    7 lb (3.175 kg)  Vag-Spont      1     5 lb (2.268 kg)  Vag-Spont               Immunizations- reviewed:   Immunization History   Administered Date(s) Administered    Influenza Vaccine (Quad) PF 2017         Objective:     Visit Vitals    /65    Pulse 84    Temp 96.9 °F (36.1 °C) (Oral)    Resp 16    Ht 5' 2\" (1.575 m)    Wt 197 lb (89.4 kg)    LMP 2017 (Exact Date)    SpO2 98%    BMI 36.03 kg/m2       Physical Exam:  GENERAL APPEARANCE: alert, well appearing, in no apparent distress  LUNGS: clear to auscultation, no wheezes, rales or rhonchi, symmetric air entry  HEART: regular rate and rhythm, no murmurs  ABDOMEN: soft, nontender, nondistended, no abnormal masses, no epigastric pain, bowel sounds present, fundal height 26 cm, FHT present at 140s bpm  BACK: no CVA tenderness  EXTREMITIES: no redness or tenderness in the calves or thighs, no edema    Labs  No results found for this or any previous visit (from the past 12 hour(s)). Assessment and Plan   33 y.o.  at 22w5d by 18.2 sono not c/w LMP. Estimated Date of Delivery: 18     -SIUP: Continue PNV and routine prenatal care. -PNL: A positive, Ab screen neg, HepBsAg neg, G/C neg, HIV NR, T pal neg, Rubella/VZV immune. 1Hr , AFP tetra negative  -Ultrasound: 18w2d US, no abnormalities, female sex. Needs f/u scan in 4 weeks from this scan to check cervical length. Nurse Edita scheduled appointment for morning of .  -History of PTL (at ?34 weeks) with 2 subsequent SVDs and one pregnancy with triplets -> placenta problem -> d&c: Continue weekly adolph injections until 37 weeks gestation or until delivery. Injection received today. -GBS status: Will check at 35-37 weeks  -Follow up: 3/8 with Dr. Anne Marie Adkins for Parva Domus 9038. Labor precautions discussed, including: regular, painful contractions lasting for greater than one hour which take your breath away; any vaginal bleeding; any leakage of fluid; or absent or decreased fetal movement. Informed pt to call M.D. on call or go to ER if any of these symptoms or signs occur. Pt discussed with Dr. Osmany Chowdary, Attending Physician. Sindhu Lagunas MD  Family Medicine Resident, PGY-3      Encounter Diagnoses:    ICD-10-CM ICD-9-CM    1. Prenatal care in second trimester Z34.92 V22.1 AMB POC URINALYSIS DIP STICK AUTO W/O MICRO   2.  History of  delivery Z87.51 V13.21 HI THER/PROPH/DIAG INJECTION, SUBCUT/IM

## 2018-02-08 NOTE — MR AVS SNAPSHOT
[address] 
[phone] Patient: Selene Jones MRN: [id number] CNU: Visit Information Date & Time Provider Department Dept. Phone Encounter #  
 2018  9:05 AM Tayla Mishra, [facility] [phone] [id number] Follow-up Instructions Return in about 4 weeks (around 3/8/2018) for [place] prenatal. Upcoming Health Maintenance Date Due  
 HPV AGE 9Y-34Y (1 of 3 - Female 3 Dose Series) 2002 DTaP/Tdap/Td series (1 - Tdap) 2012 PAP AKA CERVICAL CYTOLOGY 2012 Allergies as of 2018  Review Complete On: 2018 By: Yokasta Sweeney LPN No Known Allergies Current Immunizations  Reviewed on 2017 Name Date Influenza Vaccine (Quad) PF 2017 Not reviewed this visit You Were Diagnosed With   
  
 Codes Comments Prenatal care in second trimester    -  Primary ICD-10-CM: Z34.92 
ICD-9-CM: V22.1 History of  delivery     ICD-10-CM: Z87.51 
ICD-9-CM: V13.21 Vitals BP Pulse Temp Resp Height(growth percentile) Weight(growth percentile) 111/65 84 96.9 °F (36.1 °C) (Oral) 16 5' 2\" (1.575 m) 197 lb (89.4 kg) LMP SpO2 BMI OB Status Smoking Status 2017 (Exact Date) 98% 36.03 kg/m2 Pregnant Never Smoker Vitals History BMI and BSA Data Body Mass Index Body Surface Area 36.03 kg/m 2 1.98 m 2 Preferred Pharmacy Pharmacy Name Phone [address], [address] [place] [address] [phone] Your Updated Medication List  
  
   
This list is accurate as of: 18 10:25 AM.  Always use your most recent med list.  
  
  
  
  
 prenatal vit-iron fumarate-fa 27 mg iron- 0.8 mg Tab tablet Take 1 Tab by mouth daily. We Performed the Following AMB POC URINALYSIS DIP STICK AUTO W/O MICRO [69119 CPT(R)] Follow-up Instructions Return in about 4 weeks (around 3/8/2018) for [place] prenatal.  
  
  
Patient Instructions Semanas 22 a 26 de saenz embarazo: Instrucciones de cuidado - [ [place] 22 to 26 of Your Pregnancy: Care Instructions ] Instrucciones de cuidado Al comenzar el 7.º mes de saenz embarazo en la semana 32, los pulmones de saenz bebé se están volviendo más rodney y se están preparando para respirar. Podría notar que saenz bebé responde al yohana de saenz voz o la de saenz kvng. También podría notar que saenz bebé se voltea y [place] menos de posición, y se retuerce o sacude más. Por lo general, las sacudidas indican que saenz bebé tiene hipo. Tener hipo es totalmente normal y dura poco tiempo. Dashawn vez sea buena idea pensar en asistir a lennox clase de preparación para el parto. Stefan es también un buen momento para comenzar a pensar si desea que ayaz el parto le administren un analgésico (medicamento para el dolor). A la mayoría de las mujeres embarazadas les hacen pruebas para la diabetes gestacional entre las semanas 24 y 29. La diabetes gestacional ocurre cuando el nivel de azúcar en la fabrice es muy alto ayaz el [facility]. La prueba es importante, porque usted puede tener diabetes gestacional y no saberlo. Ailyn esta enfermedad puede causarle problemas a saenz bebé. La atención de seguimiento es lennox parte clave de saenz tratamiento y seguridad. Asegúrese de hacer y acudir a todas las citas, y llame a saenz médico si está teniendo problemas. También es lennox buena idea saber los resultados de los exámenes y mantener lennox lista de los medicamentos que roxana. Cómo puede cuidarse en el hogar? Alivie las molestias de las patadas de saenz bebé · Cambie de posición. A veces, stefan cambio hace que saenz bebé también cambie de posición. · Respire hondo al mismo tiempo que levanta los brazos sobre saenz [place]. Después exhale a medida que baja los brazos. Duc los ejercicios de [place] para evitar pérdidas de [facility] · Puede hacer los ejercicios de Kegel estando garay o sentada. ¨ Apriete los mismos músculos que usaría para detener el chorro de [place]. El abdomen y los muslos no deben moverse. ¨ Manténgalos apretados ayaz 3 segundos y, luego, relájelos otros 3 segundos. ¨ Empiece con 3 segundos. Marbella Bacca, añada 1 cindy cada semana hasta que sea capaz de apretar ayaz 10 segundos. ¨ Repita el ejercicio entre 10 y 13 veces cada sesión. Duc mamie o más sesiones cada día. Alivie o reduzca la hinchazón de los pies, los tobillos, las gray y los dedos · Si tiene los dedos hinchados, quítese los [place]. · No coma alimentos con mucha sal, rajat franki fritas. · Coloque herber pies sobre un banco o un sofá todo el tiempo que le sea posible. Duerma con almohadas debajo de los pies. · No se quede de pie ayaz mucho tiempo ni use calzado ajustado. · Use medias elásticas de soporte. Dónde puede encontrar más información en inglés? Tracee Quiver a http://abelardo-james.info/. Escriba G264 en la búsqueda para aprender más acerca de \"Semanas 22 a 26 de saenz embarazo: Instrucciones de cuidado - [ Susannah Bis 22 to 26 of Your Pregnancy: Care Instructions ]. \" 
Revisado: 16 marzo, 2017 Versión del contenido: 11.4 © 4102-3314 Healthwise, Incorporated. Las instrucciones de cuidado fueron adaptadas bajo licencia por [employer] Help Connections (which disclaims liability or warranty for this information). Si usted tiene [place] afección médica o sobre estas instrucciones, siempre pregunte a saenz profesional de bryanna. Healthwise, Incorporated niega toda garantía o responsabilidad por saenz uso de esta información. Aprenda cuándo llamar a saenz médico ayaz el embarazo (después de 20 semanas) - [ Learning About When to Call Your Doctor During Pregnancy (After 20 Weeks) ] Instrucciones de cuidado Es normal que tenga inquietudes acerca de lo que podría ser un problema Alfred Neighbor. Aunque la mayoría de las mujeres embarazadas no tienen ningún problema grave, es importante saber cuándo llamar a saenz médico si tiene determinados síntomas o señales de trabajo de [place]. Estas son algunas sugerencias generales. Saenz médico puede darle más información sobre cuándo llamar. Cuándo llamar a saenz médico (después de 20 semanas) Llame al 911 en cualquier momento que sospeche que puede necesitar atención de [place]. Por ejemplo, llame si: · Tiene sangrado vaginal intenso. · Tiene dolor repentino e intenso en el abdomen. · Se desmayó (perdió el conocimiento). · Tiene lennox convulsión. · Ve o siente el cordón umbilical. 
· Jennifer que está a punto de pineda a christiano a saenz bebé y no puede llegar en forma odell al hospital. 
Brendia Phlegm a saenz médico ahora mismo o busque atención médica inmediata si: · Tiene sangrado vaginal. 
· Tiene dolor en el abdomen. · Tiene fiebre. · Tiene síntomas de preeclampsia, tales rajat: 
¨ Hinchazón repentina de la davy, las gray o los pies. ¨ Problemas nuevos con la visión (rajat oscurecimiento de la visión o visión borrosa). ¨ Dolor de rita intenso. · Tiene lennox pérdida repentina de líquido por la vagina. (Piensa que rompió la jv). · Jennifer que puede estar en Flateyri. [place] significa que usted ha tenido al menos 4 contracciones en 20 minutos o al menos 8 contracciones en [employer]. · Nota que saenz bebé ha dejado de moverse o lo hace mucho menos de lo habitual. 
· Tiene síntomas de lennox infección del tracto urinario. Estos pueden incluir: ¨ Dolor o ardor al orinar. ¨ Necesidad de orinar con frecuencia sin poder eliminar mucha orina. ¨ Dolor en el flanco, que se encuentra hamilton debajo de la caja torácica y [place] de la cintura en un lado de la espalda. ¨ Jonathan en la orina. Preste especial atención a los cambios en saenz bryanna y asegúrese de comunicarse con saenz médico si: · Tiene flujo vaginal con un olor desagradable. · Tiene cambios en la piel, tales rajat: 
¨ Salpullido. ¨ Comezón. ¨ Color amarillento en la piel. · Tiene otras inquietudes acerca de saenz embarazo. Si tiene signos de trabajo de parto al llegar a las [address] Si tiene señales de [place] a las [address] [facility] o [place], es posible que saenz médico le diga que llame cuando saenz trabajo de parto se vuelva [place] [facility]. Los síntomas del trabajo de parto activo incluyen: · Contracciones que son regulares. · Contracciones a intervalos de menos de 5 minutos. · Contracciones ayaz las cuales es difícil hablar. La atención de seguimiento es lennox parte clave de saenz tratamiento y seguridad. Asegúrese de hacer y acudir a todas las citas, y llame a saenz médico si está teniendo problemas. También es lennox buena idea saber los resultados de los exámenes y mantener lennox lista de los medicamentos que roxana. Dónde puede encontrar más información en inglés? Jenae Begin a http://abelardo-james.info/. Escriba M914 en la búsqueda para aprender más acerca de \"Aprenda cuándo llamar a saenz médico ayaz el embarazo (después de 20 semanas) - [ Learning About When to Call Your Doctor During Pregnancy (After 20 Weeks) ]. \" 
Revisado: 16 marzo, 2017 Versión del contenido: 11.4 © 3336-9807 Healthwise, Incorporated. Las instrucciones de cuidado fueron adaptadas bajo licencia por Good Help Connections (which disclaims liability or warranty for this information). Si usted tiene [place] afección médica o sobre estas instrucciones, siempre pregunte a saenz profesional de bryanna. Healthwise, Incorporated niega toda garantía o responsabilidad por saenz uso de esta información. Introducing [facility]! Terrell Georges introduces Cell[employer]ore patient portal. Now you can access parts of your medical record, email your doctor's office, and request medication refills online. 1. In your internet browser, go to [email or web address] 2. Click on the First Time User? Click Here link in the Sign In box. You will see the New Member Sign Up page. 3. Enter your Conduit Access Code exactly as it appears below. You will not need to use this code after youve completed the sign-up process. If you do not sign up before the expiration date, you must request a new code. · Conduit Access Code: [email or web address] Expires: 3/4/2018 11:03 AM 
 
4. Enter the last four digits of your Social Security Number (xxxx) and Date of Birth (mm/dd/yyyy) as indicated and click Submit. You will be taken to the next sign-up page. 5. Create a Conduit ID. This will be your Conduit login ID and cannot be changed, so think of one that is secure and easy to remember. 6. Create a Conduit password. You can change your password at any time. 7. Enter your Password Reset Question and Answer. This can be used at a later time if you forget your password. 8. Enter your e-mail address. You will receive e-mail notification when new information is available in [address] 9. Click Sign Up. You can now view and download portions of your medical record. 10. Click the Download Summary menu link to download a portable copy of your medical information. If you have questions, please visit the Frequently Asked Questions section of the Conduit website. Remember, Conduit is NOT to be used for urgent needs. For medical emergencies, dial 911. Now available from your iPhone and Android! Please provide this summary of care documentation to your next provider. Your primary care clinician is listed as [facility]. If you have any questions after today's visit, please call [phone].

## 2018-02-08 NOTE — PROGRESS NOTES
Chief Complaint   Patient presents with    Routine Prenatal Visit     .  22w 5d today. No bleeding or LOF.  +FM. 1. Have you been to the ER, urgent care clinic since your last visit? Hospitalized since your last visit? No    2. Have you seen or consulted any other health care providers outside of the [address] [place] [address] since your last visit? Include any pap smears or colon screening.  No

## 2018-02-08 NOTE — PATIENT INSTRUCTIONS
Semanas 22 a 26 de saenz embarazo: Instrucciones de cuidado - [ Mary Angles 22 to 26 of Your Pregnancy: Care Instructions ]  Instrucciones de cuidado    Al comenzar el 7.º mes de saenz embarazo en la semana 26, los pulmones de saenz bebé se están volviendo más rodney y se están preparando para respirar. Podría notar que saenz bebé responde al yohana de saenz voz o la de saenz kvng. También podría notar que saenz bebé se voltea y [place] menos de posición, y se retuerce o sacude más. Por lo general, las sacudidas indican que saenz bebé tiene hipo. Tener hipo es totalmente normal y dura poco tiempo. Dashawn vez sea buena idea pensar en asistir a lennox clase de preparación para el parto. Stefan es también un buen momento para comenzar a pensar si desea que ayaz el parto le administren un analgésico (medicamento para el dolor). A la mayoría de las mujeres embarazadas les hacen pruebas para la diabetes gestacional entre las semanas 24 y 29. La diabetes gestacional ocurre cuando el nivel de azúcar en la fabrice es muy alto ayaz el Marck Cousins. La prueba es importante, porque usted puede tener diabetes gestacional y no saberlo. Ailyn esta enfermedad puede causarle problemas a saenz bebé. La atención de seguimiento es lennox parte clave de saenz tratamiento y seguridad. Asegúrese de hacer y acudir a todas las citas, y llame a saenz médico si está teniendo problemas. También es lennox buena idea saber los resultados de los exámenes y mantener lennox lista de los medicamentos que roxana. ¿Cómo puede cuidarse en el hogar? Alivie las molestias de las patadas de seanz bebé  · Cambie de posición. A veces, stefan cambio hace que saenz bebé también cambie de posición. · Respire hondo al mismo tiempo que levanta los brazos sobre saenz [place]. Después exhale a medida que baja los brazos. Duc los ejercicios de Keg[place] para evitar pérdidas de [place]  · [employer] Hardik ejercicios de Kegel estando garay o sentada. ¨ Apriete los mismos músculos que usaría para detener el chorro de [place].  El abdomen y los muslos no deben moverse. ¨ Manténgalos apretados ayaz 3 segundos y, luego, relájelos otros 3 segundos. ¨ Empiece con 3 segundos. Delfino Bach, añada 1 cindy cada semana hasta que sea capaz de apretar ayaz 10 segundos. ¨ Repita el ejercicio entre 10 y 13 veces cada sesión. Duc mamie o más sesiones cada día. Alivie o reduzca la hinchazón de los pies, los tobillos, las gray y los dedos  · Si tiene los dedos hinchados, quítese los [place]. · No coma alimentos con mucha sal, rajat franki fritas. · Coloque herber pies sobre un banco o un sofá todo el tiempo que le sea posible. Duerma con almohadas debajo de los pies. · No se quede de pie ayaz mucho tiempo ni use calzado ajustado. · Use medias elásticas de soporte. ¿Dónde puede encontrar más información en inglés? Shalini Culver a http://abelardo-james.info/. Escriba G264 en la búsqueda para aprender más acerca de \"Semanas 22 a 26 de saenz embarazo: Instrucciones de cuidado - [ Albino Evener 22 to 26 of Your Pregnancy: Care Instructions ]. \"  Revisado: 16 marzo, 2017  Versión del contenido: 11.4  © 9270-5573 Healthwise, Incorporated. Las instrucciones de cuidado fueron adaptadas bajo licencia por Good Help Connections (which disclaims liability or warranty for this information). Si usted tiene [place] afección médica o sobre estas instrucciones, siempre pregunte a saenz profesional de bryanna. Healthwise, Incorporated niega toda garantía o responsabilidad por saenz uso de esta información. Sandy Ruck a saenz médico ayaz el embarazo (después de 20 semanas) - [ Learning About When to Call Your Doctor During Pregnancy (After 20 Weeks) ]  Instrucciones de cuidado  Es normal que tenga inquietudes acerca de lo que podría ser un problema ayaz el [facility].  Aunque la mayoría de las mujeres embarazadas no tienen ningún problema grave, es importante saber cuándo llamar a saenz médico si tiene determinados síntomas o señales de [place] son algunas sugerencias generales. Saenz médico puede darle más información sobre cuándo llamar. Cuándo llamar a saenz médico (después de 20 semanas)  Llame al 911 en cualquier momento que sospeche que puede necesitar atención de [place]. Por ejemplo, llame si:  · Tiene sangrado vaginal intenso. · Tiene dolor repentino e intenso en el abdomen. · Se desmayó (perdió el conocimiento). · Tiene lennox convulsión. · Ve o siente el cordón umbilical.  · Jennifer que está a punto de pineda a christiano a saenz bebé y no puede llegar en forma odell al hospital.  Josphine Goodwill a saenz médico ahora mismo o busque atención médica inmediata si:  · Tiene sangrado vaginal.  · Tiene dolor en el abdomen. · Tiene fiebre. · Tiene síntomas de preeclampsia, tales rajat:  ¨ Hinchazón repentina de la davy, las gray o los pies. ¨ Problemas nuevos con la visión (rajat oscurecimiento de la visión o visión borrosa). ¨ Dolor de rita intenso. · Tiene lennox pérdida repentina de líquido por la vagina. (Piensa que rompió la jv). · Jennifer que puede estar en Flateyri. [place] significa que usted ha tenido al menos 4 contracciones en 20 minutos o al menos 8 contracciones en [employer]. · Nota que saenz bebé ha dejado de moverse o lo hace mucho menos de lo habitual.  · Tiene síntomas de lennox infección del tracto urinario. Estos pueden incluir:  ¨ Dolor o ardor al orinar. ¨ Necesidad de orinar con frecuencia sin poder eliminar mucha orina. ¨ Dolor en el flanco, que se encuentra hamilton debajo de la caja torácica y [place] de la cintura en un lado de la espalda. ¨ Jonathan en la orina. Preste especial atención a los cambios en saenz bryanna y asegúrese de comunicarse con saenz médico si:  · Tiene flujo vaginal con un olor desagradable. · Tiene cambios en la piel, tales rajat:  ¨ Salpullido. ¨ Comezón. ¨ Color amarillento en la piel. · Tiene otras inquietudes acerca de saenz embarazo.   Si tiene signos de trabajo de parto al llegar a las 37 semanas o más  Si tiene señales de Anette Files de parto a las [address], es posible que saenz médico le diga que llame cuando saenz trabajo de parto se vuelva [place] na [facility]. Los síntomas del trabajo de parto activo incluyen:  · Contracciones que son regulares. · Contracciones a intervalos de menos de 5 minutos. · Contracciones ayaz las cuales es difícil hablar. La atención de seguimiento es lennox parte clave de saenz tratamiento y seguridad. Asegúrese de hacer y acudir a todas las citas, y llame a saenz médico si está teniendo problemas. También es lennox buena idea saber los resultados de los exámenes y mantener lennox lista de los medicamentos que roxana. ¿Dónde puede encontrar más información en inglés? Princess Lynne a http://abelardo-james.info/. Escriba E974 en la búsqueda para aprender más acerca de \"Aprenda cuándo llamar a saenz médico ayaz el embarazo (después de 20 semanas) - [ Learning About When to Call Your Doctor During Pregnancy (After 20 Weeks) ]. \"  Revisado: 16 marzo, 2017  Versión del contenido: 11.4  © 6005-6848 Healthwise, Incorporated. Las instrucciones de cuidado fueron adaptadas bajo licencia por [employer] (which disclaims liability or warranty for this information). Si usted tiene [place] afección médica o sobre estas instrucciones, siempre pregunte a saenz profesional de bryanna. Healthwise, Incorporated niega toda garantía o responsabilidad por saenz uso de esta información.

## 2018-02-16 ENCOUNTER — HOSPITAL ENCOUNTER (OUTPATIENT)
Dept: PERINATAL CARE | Age: 28
Discharge: HOME | End: 2018-02-16
Attending: OBSTETRICS & GYNECOLOGY
Payer: SELF-PAY

## 2018-02-16 PROCEDURE — 76816 OB US FOLLOW-UP PER FETUS: CPT | Performed by: OBSTETRICS & GYNECOLOGY

## 2018-02-17 NOTE — PROGRESS NOTES
I reviewed with the resident the medical history and the resident's findings on the physical examination. I discussed with the resident the patient's diagnosis and concur with the plan. High risk pregnancy with hx of PTL. Cont adolph injections til 36 wks. Clinically stable. Cont prenatal care.

## 2018-02-23 ENCOUNTER — TELEPHONE (OUTPATIENT)
Dept: FAMILY MEDICINE CLINIC | Age: 28
End: 2018-02-23

## 2018-02-23 NOTE — TELEPHONE ENCOUNTER
Marquis Rosales [phone] called regarding patient   Tessa  injection. Mayra Thompson  spoke to patient advised her to get injection weekly.

## 2018-02-26 ENCOUNTER — CLINICAL SUPPORT (OUTPATIENT)
Dept: FAMILY MEDICINE CLINIC | Age: 28
End: 2018-02-26

## 2018-02-26 DIAGNOSIS — Z87.51 HISTORY OF PRETERM DELIVERY: Primary | ICD-10-CM

## 2018-02-26 NOTE — PROGRESS NOTES
[address] 250mg/ml, administer 1ml right glut. Lot # [id number], Exp date 11/01/2019, [facility] # [id number], Distributed by [employer].     No reaction, advised to return in one week for follow up injection.

## 2018-02-26 NOTE — MR AVS SNAPSHOT
[address] 
[phone] Patient: Matthew Campbell MRN: [id number] PMW:[birth year] Visit Information Date & Time Provider Department Dept. Phone Encounter #  
 2018  9:30 AM Radames Valle MD [address] [place] [address] [phone] [id number]  
  
 3/8/2018  1:00 PM  
OB VISIT with Arely Joseph MD  
[address] [place] [address] [address] [place] [address] Road) Appt Note: 26 week ob; needs 1 hr GTT  
 [address] 1007 [facility]  
[phone]  
  
   
 [address] [facility] [address] Upcoming Health Maintenance Date Due  
 HPV AGE 9Y-34Y (1 of 3 - Female 3 Dose Series) 2002 DTaP/Tdap/Td series (1 - Tdap) 2012 PAP AKA CERVICAL CYTOLOGY 2012 Allergies as of 2018  Review Complete On: 2018 By: Amada Strong MD  
 No Known Allergies Current Immunizations  Reviewed on 2017 Name Date Influenza Vaccine (Quad) PF 2017 Not reviewed this visit You Were Diagnosed With   
  
 Codes Comments History of  delivery    -  Primary ICD-10-CM: Z87.51 
ICD-9-CM: V13.21 Vitals LMP OB Status Smoking Status 2017 (Exact Date) Pregnant Never Smoker Preferred Pharmacy Pharmacy Name Phone [address], [address] [place] [address] [phone] Your Updated Medication List  
  
   
This list is accurate as of 18 11:25 AM.  Always use your most recent med list.  
  
  
  
  
 prenatal vit-iron fumarate-fa 27 mg iron- 0.8 mg Tab tablet Take 1 Tab by mouth daily. We Performed the Following [place] THER/PROPH/DIAG INJECTION, SUBCUT/IM [id number] CPT(R)] THER/PROPH/DIAG INJECTION, SUBCUT/IM [id number] CPT(R)] Introducing [facility] & HEALTH SERVICES!    
 Byron Viera introduces [employer] patient portal. Now you can access parts of your medical record, email your doctor's office, and request medication refills online. 1. In your internet browser, go to [email or web address] 2. Click on the First Time User? Click Here link in the Sign In box. You will see the New Member Sign Up page. 3. Enter your GoPlanit Access Code exactly as it appears below. You will not need to use this code after youve completed the sign-up process. If you do not sign up before the expiration date, you must request a new code. · GoPlanit Access Code: [email or web address] Expires: 3/4/2018 11:03 AM 
 
4. Enter the last four digits of your Social Security Number (xxxx) and Date of Birth (mm/dd/yyyy) as indicated and click Submit. You will be taken to the next sign-up page. 5. Create a GoPlanit ID. This will be your GoPlanit login ID and cannot be changed, so think of one that is secure and easy to remember. 6. Create a GoPlanit password. You can change your password at any time. 7. Enter your Password Reset Question and Answer. This can be used at a later time if you forget your password. 8. Enter your e-mail address. You will receive e-mail notification when new information is available in [address] 9. Click Sign Up. You can now view and download portions of your medical record. 10. Click the Download Summary menu link to download a portable copy of your medical information. If you have questions, please visit the Frequently Asked Questions section of the GoPlanit website. Remember, GoPlanit is NOT to be used for urgent needs. For medical emergencies, dial 911. Now available from your iPhone and Android! Please provide this summary of care documentation to your next provider. Your primary care clinician is listed as Corey Velazquez. If you have any questions after today's visit, please call [phone].

## 2018-03-01 ENCOUNTER — TELEPHONE (OUTPATIENT)
Dept: FAMILY MEDICINE CLINIC | Age: 28
End: 2018-03-01

## 2018-03-01 NOTE — TELEPHONE ENCOUNTER
Spoke with [facility] connection and informed her that patient had her weekly Taylor injection this past Monday (2/26/2018).

## 2018-03-01 NOTE — TELEPHONE ENCOUNTER
Per call from [facility] with [facility], asking if patient going in for week for injection?     Call [phone]    Ext [phone]    thanks

## 2018-03-08 ENCOUNTER — ROUTINE PRENATAL (OUTPATIENT)
Dept: FAMILY MEDICINE CLINIC | Age: 28
End: 2018-03-08

## 2018-03-08 VITALS
WEIGHT: 203 LBS | RESPIRATION RATE: 20 BRPM | SYSTOLIC BLOOD PRESSURE: 112 MMHG | DIASTOLIC BLOOD PRESSURE: 69 MMHG | HEIGHT: 62 IN | HEART RATE: 82 BPM | TEMPERATURE: 98.2 F | OXYGEN SATURATION: 99 % | BODY MASS INDEX: 37.36 KG/M2

## 2018-03-08 DIAGNOSIS — Z34.92 PRENATAL CARE IN SECOND TRIMESTER: Primary | ICD-10-CM

## 2018-03-08 LAB
BILIRUB UR QL STRIP: NEGATIVE
GLUCOSE UR-MCNC: NEGATIVE MG/DL
KETONES P FAST UR STRIP-MCNC: NEGATIVE MG/DL
PH UR STRIP: 7.5 [PH] (ref 4.6–8)
PROT UR QL STRIP: NEGATIVE
SP GR UR STRIP: 1.01 (ref 1–1.03)
UA UROBILINOGEN AMB POC: NORMAL (ref 0.2–1)
URINALYSIS CLARITY POC: CLEAR
URINALYSIS COLOR POC: YELLOW
URINE BLOOD POC: NEGATIVE
URINE LEUKOCYTES POC: NORMAL
URINE NITRITES POC: NEGATIVE

## 2018-03-08 RX ORDER — HYDROXYPROGESTERONE CAPROATE 250 MG/ML
250 INJECTION INTRAMUSCULAR ONCE
Qty: 250 MG | Refills: 0 | Status: SHIPPED | COMMUNITY
Start: 2018-03-08 | End: 2018-03-08

## 2018-03-08 NOTE — MR AVS SNAPSHOT
[address] 
[phone] Patient: Felipe Loyola MRN: [id number] BTE: Visit Information Date & Time Provider Department Dept. Phone Encounter #  
 3/8/2018  1:00 PM Kathleen Yang, Sreekanth [facility] [phone] [id number] Follow-up Instructions Return in about 4 weeks (around 2018). 2018  2:30 PM  
OB VISIT with Kathleen Yang MD  
1515 [facility] ([address]) Appt Note: OB  
 [address] [place] [address]  
[phone]  
  
   
 [facility] [address] Upcoming Health Maintenance Date Due  
 HPV AGE 9Y-34Y (1 of 3 - Female 3 Dose Series) 2002 DTaP/Tdap/Td series (1 - Tdap) 2012 PAP AKA CERVICAL CYTOLOGY 2012 Allergies as of 3/8/2018  Review Complete On: 2018 By: Enriqueta Closs, MD  
 No Known Allergies Current Immunizations  Reviewed on 2017 Name Date Influenza Vaccine (Quad) PF 2017 Not reviewed this visit You Were Diagnosed With   
  
 Codes Comments Prenatal care in second trimester    -  Primary ICD-10-CM: Z34.92 
ICD-9-CM: V22.1  labor in third trimester with  delivery, single or unspecified fetus     ICD-10-CM: O59.15X0 ICD-9-CM: 718.38 Vitals BP Pulse Temp Resp Height(growth percentile) Weight(growth percentile) 112/69 (BP 1 Location: Right arm, BP Patient Position: Sitting) 82 98.2 °F (36.8 °C) (Oral) 20 5' 2\" (1.575 m) 203 lb (92.1 kg) LMP SpO2 BMI OB Status Smoking Status 2017 (Exact Date) 99% 37.13 kg/m2 Pregnant Never Smoker BMI and BSA Data Body Mass Index Body Surface Area  
 37.13 kg/m 2 2.01 m 2 Preferred Pharmacy Pharmacy Name Phone [address], [address] [place] [address] [phone] Your Updated Medication List  
  
   
 This list is accurate as of 3/8/18  2:27 PM.  Always use your most recent med list.  
  
  
  
  
 HYDROXYprogesterone (PF) 250 mg/mL (1 mL) Oil injection Commonly known as:  CLARK  
250 mg by IntraMUSCular route once for 1 dose. prenatal vit-iron fumarate-fa 27 mg iron- 0.8 mg Tab tablet Take 1 Tab by mouth daily. We Performed the Following AMB POC URINALYSIS DIP STICK AUTO W/O MICRO [24512 CPT(R)] CBC WITH AUTOMATED DIFF [67866 CPT(R)] GLUCOSE, GESTATIONAL 1 HR TOLERANCE [31480 CPT(R)] Follow-up Instructions Return in about 4 weeks (around 4/5/2018). Patient Instructions Aprenda cuándo llamar a saenz médico ayaz el embarazo (después de 20 semanas) - [ Learning About When to Call Your Doctor During Pregnancy (After 20 Weeks) ] Instrucciones de cuidado Es normal que tenga inquietudes acerca de lo que podría ser un problema ayaz el [facility]. Aunque la mayoría de las mujeres embarazadas no tienen ningún problema grave, es importante saber cuándo llamar a saenz médico si tiene determinados síntomas o señales de trabajo de [place]. Estas son algunas sugerencias generales. Saenz médico puede darle más información sobre cuándo llamar. Cuándo llamar a saenz médico (después de 20 semanas) Llame al 911 en cualquier momento que sospeche que puede necesitar atención de [place]. Por ejemplo, llame si: · Tiene sangrado vaginal intenso. · Tiene dolor repentino e intenso en el abdomen. · Se desmayó (perdió el conocimiento). · Tiene lennox convulsión. · Ve o siente el cordón umbilical. 
· Jennifer que está a punto de pineda a christiano a saenz bebé y no puede llegar en forma odell al hospital. 
Yesica Pierson a saenz médico ahora mismo o busque atención médica inmediata si: · Tiene sangrado vaginal. 
· Tiene dolor en el abdomen. · Tiene fiebre. · Tiene síntomas de preeclampsia, tales rajat: 
¨ Hinchazón repentina de la davy, las gray o los pies. ¨ Problemas nuevos con la visión (rajat oscurecimiento de la visión o visión borrosa). ¨ Dolor de rita intenso. · Tiene lennox pérdida repentina de líquido por la vagina. (Piensa que rompió la jv). · Jennifer que puede estar en Flateyri. [place] significa que usted ha tenido al menos 4 contracciones en 20 minutos o al menos 8 contracciones en [employer]. · Nota que saenz bebé ha dejado de moverse o lo hace mucho menos de lo habitual. 
· Tiene síntomas de lenonx infección del tracto urinario. Estos pueden incluir: ¨ Dolor o ardor al orinar. ¨ Necesidad de orinar con frecuencia sin poder eliminar mucha orina. ¨ Dolor en el flanco, que se encuentra hamilton debajo de la caja torácica y [place] de la cintura en un lado de la espalda. ¨ Jonathan en la orina. Preste especial atención a los cambios en saenz bryanna y asegúrese de comunicarse con saenz médico si: · Tiene flujo vaginal con un olor desagradable. · Tiene cambios en la piel, tales rajat: 
¨ Salpullido. ¨ Comezón. ¨ Color amarillento en la piel. · Tiene otras inquietudes acerca de saenz embarazo. Si tiene signos de trabajo de parto al llegar a las [address] Si tiene señales de [place] a las [address] [place] [address] [employer], es posible que saenz médico le diga que llame cuando saenz trabajo de parto se vuelva [place]. Los síntomas del trabajo de parto activo incluyen: · Contracciones que son regulares. · Contracciones a intervalos de menos de 5 minutos. · Contracciones ayaz las cuales es difícil hablar. La atención de seguimiento es lennox parte clave de saenz tratamiento y seguridad. Asegúrese de hacer y acudir a todas las citas, y llame a saenz médico si está teniendo problemas. También es lennox buena idea saber los resultados de los exámenes y mantener lennox lista de los medicamentos que roxana. Dónde puede encontrar más información en inglés? Tg Hams a [email or web address]  
Escriba N531 en la búsqueda para aprender más acerca de \"Aprenda cuándo llamar a burch médico ayaz el embarazo (después de 20 semanas) - [ Learning About When to Call Your Doctor During Pregnancy (After 20 Weeks) ]. \" 
Revisado: 16 marzo, 2017 Versión del contenido: 11.4 © 1303-0742 Healthwise, Incorporated. Las instrucciones de cuidado fueron adaptadas bajo licencia por Good Help Connections (which disclaims liability or warranty for this information). Si usted tiene [place] afección médica o sobre estas instrucciones, siempre pregunte a burch profesional de bryanna. Healthwise, Incorporated niega toda garantía o responsabilidad por burch uso de esta información. Aprenda sobre el Dao Calles y la obesidad - [ Learning About Pregnancy and Obesity ] Fransico Culp tiene burch peso en burch Daojustino Calles? La mayoría de las mujeres Alexis de [place], independientemente de burch tamaño, tienen bebés saludables. Y los conceptos básicos de la atención médica son los mismos para todas las mujeres. Usted tendrá la misma cantidad de visitas médicas y los mismos tipos de exámenes que cualquier otra chanda Alexis de [place]. Recibirá lo que necesita para tener un bebé saludable. Ailyn burch tamaño puede marcar lennox diferencia en algunas cosas. Usted y burch médico tendrán que vigilar burch peso ayaz el Dao Calles. Tendrá que prestar mucha atención a cosas rajat la presión arterial y la posibilidad de tener diabetes gestacional. (La diabetes gestacional es un tipo de diabetes que algunas mujeres tienen ayaz el Dao Calles). Se le dará la misma atención especial a burch bebé en desarrollo. Burch peso también puede afectar al Loralie Gails de parto y al parto. Colabore con burch médico para recibir el cuidado que necesita. Deloise Chalk a todas las visitas médicas y siga los consejos de burch médico acerca de lo que debe hacer y lo que debe evitar ayaz el embarazo. Lon Figueroa saber acerca de adelgazar y engordar? · El embarazo no es el momento de adelgazar.  Burch bebé necesita que eulogio tenga lennox alimentación equilibrada. No elimine grupos de alimentos ni chan ningún tipo de dieta para adelgazar. · Los expertos recomiendan lennox subida de peso de entre 11 y 21 libras (5 y 9 kilos). Saenz médico colaborará con usted para fijar lennox meta de peso que sea [place] para usted. Es posible que el [facility] recomiende no aumentar de [place]. · Aunque con frecuencia las mujeres embarazadas bromean acerca de \"comer para dos\", usted no necesita comer el doble de comida. Por lo general, las mujeres embarazadas necesitan comer unas 300 calorías adicionales al día. Usted puede hacerlo por medio de un sándwich o con Lamar Frohlich y Reilly taza de yogur. Qué puede hacer para tener un embarazo saludable? Las mejores cosas que puede hacer por usted y por saenz bebé son alimentarse de manera saludable, hacer ejercicio con regularidad, evitar el tabaco y el alcohol y acudir a las visitas médicas. · Coma lennox variedad de alimentos de cada dorothy de los grupos de alimentos. Asegúrese de consumir suficiente calcio y ácido fólico. 
· Dashawn vez desee colaborar con un dietista que la ayude a planificar comidas saludables para que ingiera la cantidad correcta de calorías para usted. · Si no hacía mucho ejercicio antes de quedar embarazada, hable con saenz médico acerca de cómo puede incrementar la actividad lentamente. Es posible que saenz médico desee establecer un programa de ejercicio junto con usted. Dónde puede encontrar más información en inglés? Jaja Solitario a [email or web address]-[email or web address] Escriba B644 en la búsqueda para aprender más acerca de \"Aprenda sobre el [place] y la obesidad - [ Learning About Pregnancy and Obesity ]. \" 
Revisado: 16 marzo, 2017 Versión del contenido: 11.4 © 6331-5690 Healthwise, [employer]. Las instrucciones de cuidado fueron adaptadas bajo licencia por Good Help Connections (which disclaims liability or warranty for this information).  Si usted tiene preguntas sobre lennox afección médica o sobre estas instrucciones, siempre pregunte a saenz profesional de bryanna. [place] niega toda garantía o responsabilidad por saenz uso de esta información. Introducing [facility] & HEALTH SERVICES! Marcella Vero introduces [employer] patient portal. Now you can access parts of your medical record, email your doctor's office, and request medication refills online. 1. In your internet browser, go to [email or web address] 2. Click on the First Time User? Click Here link in the Sign In box. You will see the New Member Sign Up page. 3. Enter your NeoCodex Access Code exactly as it appears below. You will not need to use this code after youve completed the sign-up process. If you do not sign up before the expiration date, you must request a new code. · Neo[employer]x Access Code: [email or web address] Expires: 5/28/2018  5:09 PM 
 
4. Enter the last four digits of your Social Security Number (xxxx) and Date of Birth (mm/dd/yyyy) as indicated and click Submit. You will be taken to the next sign-up page. 5. Create a NeoCodex ID. This will be your NeoCodex login ID and cannot be changed, so think of one that is secure and easy to remember. 6. Create a NeoCodex password. You can change your password at any time. 7. Enter your Password Reset Question and Answer. This can be used at a later time if you forget your password. 8. Enter your e-mail address. You will receive e-mail notification when new information is available in [address] 9. Click Sign Up. You can now view and download portions of your medical record. 10. Click the Download Summary menu link to download a portable copy of your medical information. If you have questions, please visit the Frequently Asked Questions section of the Neo[employer]x website. Remember, [employer]x is NOT to be used for urgent needs. For medical emergencies, dial 911. Now available from your iPhone and Android! Please provide this summary of care documentation to your next provider. Your primary care clinician is listed as Alex Cabrera. If you have any questions after today's visit, please call [phone].

## 2018-03-08 NOTE — PATIENT INSTRUCTIONS
Chancy Collar a saenz médico ayaz el embarazo (después de 20 semanas) - [ Learning About When to Call Your Doctor During Pregnancy (After 20 Weeks) ]  Instrucciones de cuidado  Es normal que tenga inquietudes acerca de lo que podría ser un problema ayaz el Robby Taylor. Aunque la mayoría de las mujeres embarazadas no tienen ningún problema grave, es importante saber cuándo llamar a saenz médico si tiene determinados síntomas o señales de trabajo de [place]. Estas son algunas sugerencias generales. Saenz médico puede darle más información sobre cuándo llamar. Cuándo llamar a saenz médico (después de 20 semanas)  Llame al 911 en cualquier momento que sospeche que puede necesitar atención de [place]. Por ejemplo, llame si:  · Tiene sangrado vaginal intenso. · Tiene dolor repentino e intenso en el abdomen. · Se desmayó (perdió el conocimiento). · Tiene lennox convulsión. · Ve o siente el cordón umbilical.  · Jennifer que está a punto de ipneda a christiano a saenz bebé y no puede llegar en forma odell al hospital.  Arthur Post a saenz médico ahora mismo o busque atención médica inmediata si:  · Tiene sangrado vaginal.  · Tiene dolor en el abdomen. · Tiene fiebre. · Tiene síntomas de preeclampsia, tales rajat:  ¨ Hinchazón repentina de la davy, las gray o los pies. ¨ Problemas nuevos con la visión (rajat oscurecimiento de la visión o visión borrosa). ¨ Dolor de rita intenso. · Tiene lennox pérdida repentina de líquido por la vagina. (Piensa que rompió la vj). · Jennifer que puede estar en Flateyri. Kevin significa que usted ha tenido al menos 4 contracciones en 20 minutos o al menos 8 contracciones en [employer]. · Nota que saenz bebé ha dejado de moverse o lo hace mucho menos de lo habitual.  · Tiene síntomas de lennox infección del tracto urinario. Estos pueden incluir:  ¨ Dolor o ardor al orinar. ¨ Necesidad de orinar con frecuencia sin poder eliminar mucha orina.   ¨ Dolor en el flanco, que se encuentra hamilton debajo de la caja torácica y [place] de la cintura en un lado de la espalda. ¨ Jonathan en la orina. Preste especial atención a los cambios en saenz bryanna y asegúrese de comunicarse con saenz médico si:  · Tiene flujo vaginal con un olor desagradable. · Tiene cambios en la piel, tales rajat:  ¨ Salpullido. ¨ Comezón. ¨ Color amarillento en la piel. · Tiene otras inquietudes acerca de saenz embarazo. Si tiene signos de trabajo de parto al llegar a las [address] o más  Si tiene señales de Lindalee Bellow de parto a las 37 semanas o [place], es posible que saenz médico le diga que llame cuando saenz trabajo de parto se vuelva más [place]. Los síntomas del trabajo de parto activo incluyen:  · Contracciones que son regulares. · Contracciones a intervalos de menos de 5 minutos. · Contracciones ayaz las cuales es difícil hablar. La atención de seguimiento es lennox parte clave de saenz tratamiento y seguridad. Asegúrese de hacer y acudir a todas las citas, y llame a saenz médico si está teniendo problemas. También es lennox buena idea saber los resultados de los exámenes y mantener lennox lista de los medicamentos que roxana. ¿Dónde puede encontrar más información en inglés? Joana Creed a [email or web address] Escriba X248 en la búsqueda para aprender más acerca de \"Aprenda cuándo llamar a saenz médico ayaz el embarazo (después de 20 semanas) - [ Learning About When to Call Your Doctor During Pregnancy (After 20 Weeks) ]. \"  Revisado: 16 marzo, 2017  Versión del contenido: 11.4  © 9988-0422 Healthwise, Incorporated. Las instrucciones de cuidado fueron adaptadas bajo licencia por Good Help Connections (which disclaims liability or warranty for this information). Si usted tiene [place] afección médica o sobre estas instrucciones, siempre pregunte a saenz profesional de bryanna. [facility]orporated niega toda garantía o responsabilidad por saenz uso de esta información.            Aprenda sobre el [place] y la obesidad - [ Learning About Pregnancy and Obesity ]  ¿Qué efecto tiene saenz peso en saenz Jerrlyn Loges? La mayoría de las mujeres Alexis de [place], independientemente de saenz tamaño, tienen bebés saludables. Y los conceptos básicos de la atención médica son los mismos para todas las mujeres. Usted tendrá la misma cantidad de visitas médicas y los mismos tipos de exámenes que cualquier otra chanda Alexis de [place]. Recibirá lo que necesita para tener un bebé saludable. Ailyn saenz tamaño puede marcar lennox diferencia en algunas cosas. Usted y saenz médico tendrán que vigilar saenz peso ayaz el Jerrlyn Loges. Tendrá que prestar mucha atención a cosas raajt la presión arterial y la posibilidad de tener diabetes gestacional. (La diabetes gestacional es un tipo de diabetes que algunas mujeres tienen ayaz el Jerrlyn Loges). Se le dará la misma atención especial a saenz bebé en desarrollo. Saenz peso también puede afectar al Nely Dries de parto y al parto. Colabore con saenz médico para recibir el cuidado que necesita. Maryetta Bloodgood a todas las visitas médicas y siga los consejos de saenz médico acerca de lo que debe hacer y lo que debe evitar ayaz el embarazo. ¿Qué debería saber acerca de adelgazar y engordar? · El embarazo no es el momento de adelgazar. Saenz bebé necesita que usted tenga lennox alimentación equilibrada. No elimine grupos de alimentos ni chan ningún tipo de dieta para adelgazar. · Los expertos recomiendan lennox subida de peso de entre 11 y 21 libras (5 y 9 kilos). Saenz médico colaborará con usted para fijar lennox meta de peso que sea [place] para usted. Es posible que el [employer] recomiende no aumentar de [place]. · Aunque con frecuencia las mujeres embarazadas bromean acerca de \"comer para dos\", usted no necesita comer el doble de comida. Por lo general, las mujeres embarazadas necesitan comer unas 300 calorías adicionales al día. Usted puede hacerlo por medio de un sándwich o con Eliza Arias y Janett patel de yogur. ¿Qué puede hacer para tener un embarazo saludable?   [address] [facility] [address] [facility] [facility] [address] por usted y por saenz bebé son Rudie [place], hacer ejercicio con regularidad, evitar el tabaco y el alcohol y acudir a las visitas médicas. · Coma lennox variedad de alimentos de cada dorothy de los grupos de alimentos. Asegúrese de consumir suficiente calcio y ácido fólico.  · Dashawn vez desee colaborar con un dietista que la ayude a planificar comidas saludables para que ingiera la cantidad correcta de calorías para usted. · Si no hacía mucho ejercicio antes de quedar embarazada, hable con saenz médico acerca de cómo puede incrementar la actividad lentamente. Es posible que saenz médico desee establecer un programa de ejercicio junto con usted. ¿Dónde puede encontrar más información en inglés? Robin Castillo a [email or web address]-[email or web address] Escriba B644 en la búsqueda para aprender más acerca de \"Aprenda sobre el [place] y la obesidad - [ Learning About Pregnancy and Obesity ]. \"  Revisado: 16 marzo, 2017  Versión del contenido: 11.4  © 8594-5701 Healthwise, Incorporated. Las instrucciones de cuidado fueron adaptadas bajo licencia por Good Help Connections (which disclaims liability or warranty for this information). Si usted tiene [place] afección médica o sobre estas instrucciones, siempre pregunte a saenz profesional de bryanna. [place], [employer] niega toda garantía o responsabilidad por saenz uso de esta información.

## 2018-03-08 NOTE — PROGRESS NOTES
Return OB Visit       Subjective:   Alix Campos 33 y.o. G5   STEPHANIE: 6/9/2018, by Ultrasound  GA:  26w5d. Fetal movement - yes  Vaginal bleeding - no  Vaginal discharge - no  LOF - no    Diet - eating healthy  Exercise - yes- walking  PNV/Other supplements - yes    Allergies- reviewed:   No Known Allergies  Medications- reviewed:   Current Outpatient Prescriptions   Medication Sig    prenatal vit-iron fumarate-fa 27 mg iron- 0.8 mg tab tablet Take 1 Tab by mouth daily. No current facility-administered medications for this visit. Past Medical History- reviewed:  No past medical history on file. Past Surgical History- reviewed:   No past surgical history on file. Social History- reviewed:  Social History     Social History    Marital status: UNKNOWN     Spouse name: N/A    Number of children: N/A    Years of education: N/A     Occupational History    Not on file.      Social History Main Topics    Smoking status: Never Smoker    Smokeless tobacco: Never Used    Alcohol use No    Drug use: No    Sexual activity: Yes     Partners: Male     Other Topics Concern    Not on file     Social History Narrative     Immunizations- reviewed:   Immunization History   Administered Date(s) Administered    Influenza Vaccine (Quad) PF 12/12/2017         Objective:     Visit Vitals    /69 (BP 1 Location: Right arm, BP Patient Position: Sitting)    Pulse 82    Temp 98.2 °F (36.8 °C) (Oral)    Resp 20    Ht 5' 2\" (1.575 m)    Wt 203 lb (92.1 kg)    LMP 08/20/2017 (Exact Date)    SpO2 99%    BMI 37.13 kg/m2       Physical Exam:  GENERAL APPEARANCE: alert, well appearing, in no apparent distress  LUNGS: clear to auscultation, no wheezes, rales or rhonchi, symmetric air entry  HEART: regular rate and rhythm, no murmurs  ABDOMEN: soft, nontender, nondistended, no abnormal masses, no epigastric pain, bowel sounds present, fundal height 30 cm, FHT present at 140 bpm  BACK: no CVA tenderness  UTERUS: gravid  EXTREMITIES: no redness or tenderness in the calves or thighs, no edema  NEUROLOGICAL: alert, oriented, normal speech, no focal findings or movement disorder noted    Labs  Recent Results (from the past 12 hour(s))   AMB POC URINALYSIS DIP STICK AUTO W/O MICRO    Collection Time: 18  1:32 PM   Result Value Ref Range    Color (UA POC) Yellow     Clarity (UA POC) Clear     Glucose (UA POC) Negative Negative    Bilirubin (UA POC) Negative Negative    Ketones (UA POC) Negative Negative    Specific gravity (UA POC) 1.015 1.001 - 1.035    Blood (UA POC) Negative Negative    pH (UA POC) 7.5 4.6 - 8.0    Protein (UA POC) Negative Negative    Urobilinogen (UA POC) 0.2 mg/dL 0.2 - 1    Nitrites (UA POC) Negative Negative    Leukocyte esterase (UA POC) Trace Negative         Assessment   Verlin Neas 33 y.o. G5   STEPHANIE: 2018, by Ultrasound  GA:  26w5d. ICD-10-CM ICD-9-CM    1. Prenatal care in second trimester Z34.92 V22.1 GLUCOSE, GESTATIONAL 1 HR TOLERANCE      CBC WITH AUTOMATED DIFF   2.  labor in third trimester with  delivery, single or unspecified fetus O60.14X0 644.21 AMB POC URINALYSIS DIP STICK AUTO W/O MICRO         Plan     Orders Placed This Encounter    GLUCOSE, GESTATIONAL 1 HR TOLERANCE    CBC WITH AUTOMATED DIFF    AMB POC URINALYSIS DIP STICK AUTO W/O MICRO       IUP:  - Prenatal Labs: A+, AB screen neg, HBsAg neg, G/C neg, HIV NR, T pal neg, Rubella/VZV immune  - GBS: will check at 35-37 weeks  - Tdap: next visit   - Ultrasound on  showed single viable IUP, fetal growth is appropriate; EFW is at the 65th percentile. Will do US on third trimester   - CBC today     Pregnancy complicated by:  -History of PTL (at ?34 weeks) with 2 subsequent SVDs and one pregnancy with triplets -> placenta problem -> d&c: Continue weekly adolph injections until 36 weeks gestation per MFM.  Injection received today  - Obesity- 1 Hr GTT done in December due to obesity: 138. Will repeat today. Follow up: RTC in 4 weeks - appt made      Labor precautions discussed, including: Regular painful contractions, lasting for greater than one hour, taking your breath away; any vaginal bleeding; any leakage of fluid; or absent or decreased fetal movement. Call M.D. on call if any of these symptoms or signs occur. I have discussed the diagnosis with the patient and the intended plan as seen in the above orders. The patient has received an after-visit summary and questions were answered concerning future plans. I have discussed medication side effects and warnings with the patient as well. Informed pt to return to the office or go to the ER if she experiences vaginal bleeding, vaginal discharge, leaking of fluid, pelvic cramping.     Pt seen and discussed with Dr. Mel Fink (attending physician)      Saray Diaz MD  Family Medicine Resident, PGY-1

## 2018-03-09 LAB
BASOPHILS # BLD AUTO: 0 X10E3/UL (ref 0–0.2)
BASOPHILS NFR BLD AUTO: 0 %
EOSINOPHIL # BLD AUTO: 0.2 X10E3/UL (ref 0–0.4)
EOSINOPHIL NFR BLD AUTO: 2 %
ERYTHROCYTE [DISTWIDTH] IN BLOOD BY AUTOMATED COUNT: 14 % (ref 12.3–15.4)
GLUCOSE 1H P 50 G GLC PO SERPL-MCNC: 110 MG/DL (ref 65–139)
HCT VFR BLD AUTO: 36.7 % (ref 34–46.6)
HGB BLD-MCNC: 12.1 G/DL (ref 11.1–15.9)
IMM GRANULOCYTES # BLD: 0.1 X10E3/UL (ref 0–0.1)
IMM GRANULOCYTES NFR BLD: 1 %
LYMPHOCYTES # BLD AUTO: 2.8 X10E3/UL (ref 0.7–3.1)
LYMPHOCYTES NFR BLD AUTO: 27 %
MCH RBC QN AUTO: 27.6 PG (ref 26.6–33)
MCHC RBC AUTO-ENTMCNC: 33 G/DL (ref 31.5–35.7)
MCV RBC AUTO: 84 FL (ref 79–97)
MONOCYTES # BLD AUTO: 0.7 X10E3/UL (ref 0.1–0.9)
MONOCYTES NFR BLD AUTO: 6 %
NEUTROPHILS # BLD AUTO: 6.7 X10E3/UL (ref 1.4–7)
NEUTROPHILS NFR BLD AUTO: 64 %
PLATELET # BLD AUTO: 301 X10E3/UL (ref 150–379)
RBC # BLD AUTO: 4.38 X10E6/UL (ref 3.77–5.28)
WBC # BLD AUTO: 10.5 X10E3/UL (ref 3.4–10.8)

## 2018-03-20 ENCOUNTER — CLINICAL SUPPORT (OUTPATIENT)
Dept: FAMILY MEDICINE CLINIC | Age: 28
End: 2018-03-20

## 2018-03-20 RX ORDER — HYDROXYPROGESTERONE CAPROATE 250 MG/ML
250 INJECTION INTRAMUSCULAR ONCE
Qty: 250 MG | Refills: 0 | Status: SHIPPED | COMMUNITY
Start: 2018-03-20 | End: 2018-03-20

## 2018-03-20 NOTE — MR AVS SNAPSHOT
[address] 
[phone] Patient: Moe Fuentes MRN: [id number] Visit Information Date & Time Provider Department Dept. Phone Encounter #  
 3/20/2018  1:45 PM Adrianna Alonso MD [facility] [phone] [id number]  
  
 4/6/2018  2:30 PM  
OB VISIT with Val Boykin MD  
[address] [facility] [address] [place] [address]) Appt Note: OB  
 [address] [facility] [facility]  
[phone]  
  
   
 [address] [facility] [phone] Upcoming Health Maintenance Date Due  
 HPV AGE 9Y-34Y (1 of 3 - Female 3 Dose Series) 8/30/2002 DTaP/Tdap/Td series (1 - Tdap) 8/30/2012 PAP AKA CERVICAL CYTOLOGY 8/30/2012 OB 3RD TRIMESTER TDAP 3/10/2018 Allergies as of 3/20/2018  Review Complete On: 2/9/2018 By: Carol Payne MD  
 No Known Allergies Current Immunizations  Reviewed on 12/12/2017 Name Date Influenza Vaccine (Quad) PF 12/12/2017 Not reviewed this visit Vitals LMP OB Status Smoking Status 08/20/2017 (Exact Date) Pregnant Never Smoker Your Updated Medication List  
  
   
This list is accurate as of 3/20/18  3:04 PM.  Always use your most recent med list.  
  
  
  
  
 prenatal vit-iron fumarate-fa 27 mg iron- 0.8 mg Tab tablet Take 1 Tab by mouth daily. Introducing [facility]! Cindy Schultz introduces Beem patient portal. Now you can access parts of your medical record, email your doctor's office, and request medication refills online. 1. In your internet browser, go to [email or web address] 2. Click on the First Time User? Click Here link in the Sign In box. You will see the New Member Sign Up page. 3. Enter your Be[employer] Access Code exactly as it appears below. You will not need to use this code after youve completed the sign-up process.  If you do not sign up before the expiration date, you must request a new code. · [facility] Access Code: [email or web address] Expires: 5/28/2018  6:09 PM 
 
4. Enter the last four digits of your Social Security Number (xxxx) and Date of Birth (mm/dd/yyyy) as indicated and click Submit. You will be taken to the next sign-up page. 5. Create a [facility] ID. This will be your [facility] login ID and cannot be changed, so think of one that is secure and easy to remember. 6. Create a [facility] password. You can change your password at any time. 7. Enter your Password Reset Question and Answer. This can be used at a later time if you forget your password. 8. Enter your e-mail address. You will receive e-mail notification when new information is available in [address] 9. Click Sign Up. You can now view and download portions of your medical record. 10. Click the Download Summary menu link to download a portable copy of your medical information. If you have questions, please visit the Frequently Asked Questions section of the [facility] website. Remember, [facility] is NOT to be used for urgent needs. For medical emergencies, dial 911. Now available from your iPhone and Android! Please provide this summary of care documentation to your next provider. Your primary care clinician is listed as Dion Garcia. If you have any questions after today's visit, please call [phone].

## 2018-03-20 NOTE — PROGRESS NOTES
Chief Complaint   Patient presents with    Injection     Injection left hip     1. Have you been to the ER, urgent care clinic since your last visit? Hospitalized since your last visit? No    2. Have you seen or consulted any other health care providers outside of the [address] [place] [address] since your last visit? Include any pap smears or colon screening. No     Reviewed record in preparation for visit and have obtained necessary documentation. Patient aware to return in one week.

## 2018-03-20 NOTE — PROGRESS NOTES
I reviewed with the resident the medical history and the resident's findings on the physical examination. I discussed with the resident the patient's diagnosis and concur with the plan. 28yo [id number] @ 26.5 by 14.3 bernice  1. IUP: IOB labs reviewed, RH pos, AFP tetra neg  2. Hx PTD: on Tessa, cervical lengths normal   3.   Obesity: early GTT WNL (barely), repeat screen this visit

## 2018-04-06 ENCOUNTER — ROUTINE PRENATAL (OUTPATIENT)
Dept: FAMILY MEDICINE CLINIC | Age: 28
End: 2018-04-06

## 2018-04-06 VITALS
WEIGHT: 207 LBS | TEMPERATURE: 97.8 F | SYSTOLIC BLOOD PRESSURE: 104 MMHG | BODY MASS INDEX: 38.09 KG/M2 | DIASTOLIC BLOOD PRESSURE: 62 MMHG | OXYGEN SATURATION: 99 % | RESPIRATION RATE: 18 BRPM | HEIGHT: 62 IN | HEART RATE: 84 BPM

## 2018-04-06 DIAGNOSIS — E66.9 OBESITY (BMI 35.0-39.9 WITHOUT COMORBIDITY): ICD-10-CM

## 2018-04-06 DIAGNOSIS — Z87.898: ICD-10-CM

## 2018-04-06 DIAGNOSIS — Z34.90 PREGNANCY, UNSPECIFIED GESTATIONAL AGE: Primary | ICD-10-CM

## 2018-04-06 DIAGNOSIS — Z87.51 HISTORY OF PRETERM DELIVERY: ICD-10-CM

## 2018-04-06 LAB
BILIRUB UR QL STRIP: NEGATIVE
GLUCOSE UR-MCNC: NEGATIVE MG/DL
KETONES P FAST UR STRIP-MCNC: NEGATIVE MG/DL
PH UR STRIP: 7.5 [PH] (ref 4.6–8)
PROT UR QL STRIP: NEGATIVE
SP GR UR STRIP: 1.2 (ref 1–1.03)
UA UROBILINOGEN AMB POC: ABNORMAL (ref 0.2–1)
URINALYSIS CLARITY POC: CLEAR
URINALYSIS COLOR POC: YELLOW
URINE BLOOD POC: NEGATIVE
URINE LEUKOCYTES POC: ABNORMAL
URINE NITRITES POC: NEGATIVE

## 2018-04-06 RX ORDER — HYDROXYPROGESTERONE CAPROATE 250 MG/ML
250 INJECTION INTRAMUSCULAR ONCE
Qty: 250 MG | Refills: 0 | Status: SHIPPED | COMMUNITY
Start: 2018-04-06 | End: 2018-04-06

## 2018-04-06 NOTE — PROGRESS NOTES
30w6d    /62    FH = 35 cm    Hx  deliveries (suspect 34 weeks)    On weekly 17-OH progesterone    [id number]    I reviewed with the resident the medical history and the resident's findings on the physical examination. I discussed with the resident the patient's diagnosis and concur with the plan.

## 2018-04-06 NOTE — PATIENT INSTRUCTIONS
Weeks 30 to 32 of Your Pregnancy: Care Instructions  Your Care Instructions    You have made it to the final months of your pregnancy. By now, your baby is really starting to look like a baby, with hair and plump skin. As you enter the final weeks of pregnancy, the reality of having a baby may start to set in. This is the time to settle on a name, get your household in order, set up a safe nursery, and find quality  if needed. Doing these things in advance will allow you to focus on caring for and enjoying your new baby. You may also want to have a tour of your hospital's labor and delivery unit to get a better idea of what to expect while you are in the hospital.  During these last months, it is very important to take good care of yourself and pay attention to what your body needs. If your doctor says it is okay for you to work, don't push yourself too hard. Use the tips provided in this care sheet to ease heartburn and care for varicose veins. If you haven't already had the Tdap shot during this pregnancy, talk to your doctor about getting it. It will help protect your  against pertussis infection. Follow-up care is a key part of your treatment and safety. Be sure to make and go to all appointments, and call your doctor if you are having problems. It's also a good idea to know your test results and keep a list of the medicines you take. How can you care for yourself at home? Pay attention to your baby's movements  · You should feel your baby move several times every day. · Your baby now turns less, and kicks and jabs more. · Your baby sleeps 20 to 45 minutes at a time and is more active at certain times of day. · If your doctor wants you to count your baby's kicks:  ¨ Empty your bladder, and lie on your side or relax in a comfortable chair. ¨ Write down your start time. ¨ Pay attention only to your baby's movements. Count any movement except hiccups.   ¨ After you have counted 10 movements, write down your stop time. ¨ Write down how many minutes it took for your baby to move 10 times. ¨ If an hour goes by and you have not recorded 10 movements, have something to eat or drink and then count for another hour. If you do not record 10 movements in either hour, call your doctor. Ease heartburn  · Eat small, frequent meals. · Do not eat chocolate, peppermint, or very spicy foods. Avoid drinks with caffeine, such as coffee, tea, and sodas. · Avoid bending over or lying down after meals. · Talk a short walk after you eat. · If heartburn is a problem at night, do not eat for 2 hours before bedtime. · Take antacids like Mylanta, Maalox, Rolaids, or Tums. Do not take antacids that have sodium bicarbonate. Care for varicose veins  · Varicose veins are blood vessels that stretch out with the extra blood during pregnancy. Your legs may ache or throb. Most varicose veins will go away after the birth. · Avoid standing for long periods of time. Sit with your legs crossed at the ankles, not the knees. · Sit with your feet propped up. · Avoid tight clothing or stockings. Wear support hose. · Exercise regularly. Try walking for at least 30 minutes a day. Where can you learn more? Go to http://abelrado-jmaes.info/. Enter D916 in the search box to learn more about \"Weeks 30 to 32 of Your Pregnancy: Care Instructions. \"  Current as of: March 16, 2017  Content Version: 11.4  © 5131-5096 [employer]. Care instructions adapted under license by [employer] (which disclaims liability or warranty for this information). If you have questions about a medical condition or this instruction, always ask your healthcare professional. [place] [address] any warranty or liability for your use of this information. Semanas 30 a 32 de saenz embarazo:  Instrucciones de cuidado - [ Mali Reddy 30 to 28 of Your Pregnancy: Care Instructions ]  Instrucciones de cuidado    Ha llegado a los últimos meses de embarazo. A estas [place], burch bebé en verdad comienza a tener la apariencia de un bebé, con christin y piel rellenita. A medida que entra en las últimas semanas de [facility] comenzará a caer en la realidad de que va a tener un bebé. Starla es el momento de elegir un nombre, ordenar burch casa, organizar un cuarto de niños seguro y buscar atención infantil de calidad, de ser necesaria. Hacer esto con anterioridad le permitirá concentrarse en cuidar y disfrutar de burch bebé. También podría hacer lennox visita a la venkat de partos del hospital para Heriberto Moore mejor idea sobre qué esperar mientras está en el hospital.  Ayaz estos últimos meses, es muy importante que se cuide y preste atención a lo que burch cuerpo necesita. Si burch médico le dice que está willem que trabaje, no se exija demasiado. Siga las recomendaciones proporcionadas en esta hoja de cuidados para aliviar la acidez estomacal y [address] várices. Si todavía no le musa aplicado la vacuna Tdap (tétanos, difteria y tos [place]) ayaz starla [facility], hable con burch médico acerca de aplicársela. Richi Miller a proteger a burch recién nacido contra la infección por tos ferina. La atención de seguimiento es lennox parte clave de burch tratamiento y seguridad. Asegúrese de hacer y acudir a todas las citas, y llame a burch médico si está teniendo problemas. También es lennox buena idea saber los resultados de los exámenes y mantener lennox lista de los medicamentos que roxana. ¿Cómo puede cuidarse en el hogar? Preste atención a los movimientos de burch bebé  · Debería sentir que burch bebé se mueve varias veces al día. · Burch bebé ahora cambia menos de posición, y patea y da golpes con más frecuencia. · Burch bebé duerme de 20 a 45 minutos cada vez y [address] en determinados momentos del día. · Si burch médico quiere que cuente las patadas de burch bebé:  ¨ Vacíe burch vejiga y acuéstese de lado o recuéstese en lennox silla cómoda.   ¨ Anote la hora inicial.  ¨ Preste atención solo a los movimientos de saenz bebé. Cuente todos los movimientos, excepto los del hipo. ¨ Después de que haya contado 10 movimientos, anote la hora. ¨ Anote cuántos minutos le llevaron a saenz bebé los 10 movimientos. ¨ Si después de lennox hora no ha registrado 10 movimientos, coma o selma algo, y luego cuente por otra hora. Si no registra 10 movimientos en cualquiera de las horas, llame a saenz médico.  Alivie la acidez estomacal  · Coma comidas pequeñas y frecuentes. · No coma chocolate, menta ni comidas muy picantes. Evite las bebidas con cafeína, rajat el café, el té y las sodas. · Evite doblarse hacia adelante o acostarse después de comer. · Duc lennox caminata corta después de comer. · Si tiene problemas de [place] estomacal ayaz la noche, no coma por 2 horas antes de acostarse. · [place] antiácidos, rajat Mylanta, Maalox, Rolaids o Tums. No tome antiácidos que contengan bicarbonato de sodio. Cuide las várices  · Las várices son vasos sanguíneos que se dilatan debido a la mayor cantidad de fabrice ayaz el embarazo. Puede tener dolor o palpitaciones en las piernas. La mayoría de las várices desaparecerán después del [place]. · Evite estar garay ayaz mucho tiempo. Siéntese con las piernas cruzadas a la altura de los tobillos, no de las rodillas. · Siéntese con los pies elevados. · Evite usar ropa o medias ajustadas. Use medias de compresión. · Duc ejercicio con regularidad. Trate de caminar por lo menos 30 minutos al día. ¿Dónde puede encontrar más información en inglés? Evelina Alba a http://abelardo-james.info/. Rosmery Terrazas H880 en la búsqueda para aprender más acerca de \"Semanas 30 a 28 de saenz embarazo: Instrucciones de cuidado - [ Lien Ritchie 30 to 28 of Your Pregnancy: Care Instructions ]. \"  Revisado: 16 marzo, 2017  Versión del contenido: 11.4  © 2605-7537 [employer]wise[employer].  Las instrucciones de cuidado fueron adaptadas bajo licencia por [employer] (which disclaims liability or warranty for this information). Si usted tiene [place] afección médica o sobre estas instrucciones, siempre pregunte a saenz profesional de bryanna. [facility] niega toda garantía o responsabilidad por saenz uso de esta información.

## 2018-04-06 NOTE — PROGRESS NOTES
Return OB Visit       Subjective:   Cherelle Lilly 33 y.o. G5   STEPHANIE: 6/9/2018, by Ultrasound  GA:  30w6d. Fetal movement - yes  Vaginal bleeding - no  Vaginal discharge - no  LOF - no    Diet - eating healthy   Exercise - yes  PNV/Other supplements - yes    Last week started having some RUQ pain and mid epigastric pain     Allergies- reviewed:   No Known Allergies  Medications- reviewed:   Current Outpatient Prescriptions   Medication Sig    prenatal vit-iron fumarate-fa 27 mg iron- 0.8 mg tab tablet Take 1 Tab by mouth daily. No current facility-administered medications for this visit. Past Medical History- reviewed:  History reviewed. No pertinent past medical history. Past Surgical History- reviewed:   History reviewed. No pertinent surgical history. Social History- reviewed:  Social History     Social History    Marital status: UNKNOWN     Spouse name: N/A    Number of children: N/A    Years of education: N/A     Occupational History    Not on file.      Social History Main Topics    Smoking status: Never Smoker    Smokeless tobacco: Never Used    Alcohol use No    Drug use: No    Sexual activity: Yes     Partners: Male     Other Topics Concern    Not on file     Social History Narrative     Immunizations- reviewed:   Immunization History   Administered Date(s) Administered    Influenza Vaccine (Quad) PF 12/12/2017         Objective:     Visit Vitals    /62 (BP 1 Location: Right arm, BP Patient Position: Sitting)    Pulse 84    Temp 97.8 °F (36.6 °C) (Oral)    Resp 18    Ht 5' 2\" (1.575 m)    Wt 207 lb (93.9 kg)    LMP 08/20/2017 (Exact Date)    SpO2 99%    BMI 37.86 kg/m2       Physical Exam:  GENERAL APPEARANCE: alert, well appearing, in no apparent distress  LUNGS: clear to auscultation, no wheezes, rales or rhonchi, symmetric air entry  HEART: regular rate and rhythm, no murmurs  ABDOMEN: soft, nontender, nondistended, no abnormal masses, no epigastric pain, bowel sounds present, fundal height 35 cm, FHT present at 135 bpm  BACK: no CVA tenderness  UTERUS: gravid  EXTREMITIES: no redness or tenderness in the calves or thighs, no edema  NEUROLOGICAL: alert, oriented, normal speech, no focal findings or movement disorder noted      Labs- reviewed   Recent Results (from the past 12 hour(s))   AMB POC URINALYSIS DIP STICK AUTO W/O MICRO    Collection Time: 18  3:38 PM   Result Value Ref Range    Color (UA POC) Yellow     Clarity (UA POC) Clear     Glucose (UA POC) Negative Negative    Bilirubin (UA POC) Negative Negative    Ketones (UA POC) Negative Negative    Specific gravity (UA POC) 1.202 (A) 1.001 - 1.035    Blood (UA POC) Negative Negative    pH (UA POC) 7.5 4.6 - 8.0    Protein (UA POC) Negative Negative    Urobilinogen (UA POC) 1 mg/dL 0.2 - 1    Nitrites (UA POC) Negative Negative    Leukocyte esterase (UA POC) 2+ Negative         Assessment   Vernon Rockford 33 y.o. G5   STEPHANIE: 2018, by Ultrasound  GA:  30w6d. ICD-10-CM ICD-9-CM    1. Pregnancy, unspecified gestational age Z27.80 V22.2 AMB POC URINALYSIS DIP STICK AUTO W/O MICRO      CULTURE, URINE      CANCELED: AMB POC URINE PREGNANCY TEST, VISUAL COLOR COMPARISON   2. History of  delivery Z87.51 V13.21 MS THER/PROPH/DIAG INJECTION, SUBCUT/IM   3. Hx of triplet pregnancy in prior pregnancy Z87.59 V13.29    4. Obesity (BMI 35.0-39.9 without comorbidity) E66.9 278.00          Plan     Orders Placed This Encounter    CULTURE, URINE    AMB POC URINALYSIS DIP STICK AUTO W/O MICRO    THER/PROPH/DIAG INJECTION, SUBCUT/IM       IUP:  - Prenatal Labs: A+, AB screen neg, HBsAg neg, G/C neg, HIV NR, T pal neg, Rubella/VZV immune  - GBS: will check at 35-37 weeks  - Tdap: needs to be given at next visit   - Ultrasound:  showed single viable IUP, fetal growth is appropriate; EFW is at the 65th percentile. Repeat US at 36 weeks   - Hgb in third trimester: 12.1   - Urine with 2+ LE.  Will sent urine culture     Pregnancy complicated by:  -History of PTL (at ?34 weeks) with 2 subsequent SVDs and one pregnancy with triplets -> placenta problem -> d&c: Continue weekly adolph injections until 36 weeks gestation per M. Adolph injection today  - Obesity- 1 Hr GTT done in December due to obesity: 138. Repeat 1hr GTT in previous visit. Normal.     Follow up: RTC in 2 weeks - appt made       Labor precautions discussed, including: Regular painful contractions, lasting for greater than one hour, taking your breath away; any vaginal bleeding; any leakage of fluid; or absent or decreased fetal movement. Call M.D. on call if any of these symptoms or signs occur. I have discussed the diagnosis with the patient and the intended plan as seen in the above orders. The patient has received an after-visit summary and questions were answered concerning future plans. I have discussed medication side effects and warnings with the patient as well. Informed pt to return to the office or go to the ER if she experiences vaginal bleeding, vaginal discharge, leaking of fluid, pelvic cramping.     Pt seen and discussed with Dr. Willis Galdamez (attending physician)      Iona Nettles MD  Family Medicine Resident, PGY-1

## 2018-04-06 NOTE — MR AVS SNAPSHOT
[address] 
[phone] Patient: Osmin Smith MRN: [id number] YCO: Visit Information Oliva Mishra Personal Médico Departamento Tel[facility]fono del [facility]. Número de visita 2018  2:30 PM Carlos Rubin [address] [place] [address] [phone] Follow-up Instructions Return in about 2 weeks (around 2018). 2018  3:00 PM  
OB VISIT with Ingrid Topete DO [address] [place] [address] ([facility]) Appt Note: 36 weeks [address]  
[phone]  
  
   
 [address] [facility] [address]  
  
    
 2018  1:30 PM  
OB VISIT with Carlos Rubin MD  
[address] [place] [address] Appt Note: 32 weeks [address]  
[phone]  
  
   
 [address] [facility] [address] Upcoming Health Maintenance Date Due  
 HPV AGE 9Y-34Y (1 of 3 - Female 3 Dose Series) 2002 DTaP/Tdap/Td series (1 - Tdap) 2012 PAP AKA CERVICAL CYTOLOGY 2012 OB 3RD TRIMESTER TDAP 3/10/2018 Alergias  Review Complete El: 2018 Por: Milagros Dunlap LPN A partir del:  2018 No Known Allergies Vacunas actuales [id number] el:  2017 Wyvonnia Nipper Influenza Vaccine (Quad) PF 2017 No revisadas esta visita You Were Diagnosed With   
  
 Vanesa Smiling Pregnancy, unspecified gestational age    -  Primary ICD-10-CM: Z34.90 ICD-9-CM: V22.2 History of  delivery     ICD-10-CM: Z87.51 
ICD-9-CM: V13.21 Hx of triplet pregnancy in prior pregnancy     ICD-10-CM: Z87.59 
ICD-9-CM: V13.29 Obesity (BMI 35.0-39.9 without comorbidity)     ICD-10-CM: B83.7 ICD-9-CM: 278.00 Partes vitales PS Pulso Temperatura Resp Corinne ( percentil de crecimiento) Peso (percentil de crecimiento) 104/62 (BP 1 Location: Right arm, BP Patient Position: Sitting) 84 97.8 °F (36.6 °C) (Oral) 18 5' 2\" (1.575 m) 207 lb (93.9 kg) LMP (última lizzie) SpO2 BMI (IMC) Estado obstétrico Estatus de tabaquísmo 08/20/2017 (Exact Date) 99% 37.86 kg/m2 Pregnant Never Smoker Historial de signos vitales BMI and BSA Data Body Mass Index Body Surface Area  
 37.86 kg/m 2 2.03 m 2 Preferred Pharmacy Pharmacy Name Phone [address], [address] [place] [address] [phone] Burch lista de medicamentos actualizada Cindy Foster actualizada 4/6/18  3:46 PM.  Neo Hussein use burch lista de medicamentos más reciente. prenatal vit-iron fumarate-fa 27 mg iron- 0.8 mg Tab tablet Take 1 Tab by mouth daily. Hicimos lo siguiente AMB POC URINALYSIS DIP STICK AUTO W/O MICRO [60764 CPT(R)] CULTURE, URINE [id number] CPT(R)] AL THER/PROPH/DIAG INJECTION, SUBCUT/IM [id number] CPT(R)] Instrucciones de seguimiento Return in about 2 weeks (around 4/20/2018). Instrucciones para el Paciente Weeks 30 to 32 of Your Pregnancy: Care Instructions Your Care Instructions You have made it to the final months of your pregnancy. By now, your baby is really starting to look like a baby, with hair and plump skin. As you enter the final weeks of pregnancy, the reality of having a baby may start to set in. This is the time to settle on a name, get your household in order, set up a safe nursery, and find quality  if needed. Doing these things in advance will allow you to focus on caring for and enjoying your new baby. You may also want to have a tour of your hospital's labor and delivery unit to get a better idea of what to expect while you are in the hospital. 
During these last months, it is very important to take good care of yourself and pay attention to what your body needs.  If your doctor says it is okay for you to work, don't push yourself too hard. Use the tips provided in this care sheet to ease heartburn and care for varicose veins. If you haven't already had the Tdap shot during this pregnancy, talk to your doctor about getting it. It will help protect your  against pertussis infection. Follow-up care is a key part of your treatment and safety. Be sure to make and go to all appointments, and call your doctor if you are having problems. It's also a good idea to know your test results and keep a list of the medicines you take. How can you care for yourself at home? Pay attention to your baby's movements · You should feel your baby move several times every day. · Your baby now turns less, and kicks and jabs more. · Your baby sleeps 20 to 45 minutes at a time and is more active at certain times of day. · If your doctor wants you to count your baby's kicks: 
¨ Empty your bladder, and lie on your side or relax in a comfortable chair. ¨ Write down your start time. ¨ Pay attention only to your baby's movements. Count any movement except hiccups. ¨ After you have counted 10 movements, write down your stop time. ¨ Write down how many minutes it took for your baby to move 10 times. ¨ If an hour goes by and you have not recorded 10 movements, have something to eat or drink and then count for another hour. If you do not record 10 movements in either hour, call your doctor. Ease heartburn · Eat small, frequent meals. · Do not eat chocolate, peppermint, or very spicy foods. Avoid drinks with caffeine, such as coffee, tea, and sodas. · Avoid bending over or lying down after meals. · Talk a short walk after you eat. · If heartburn is a problem at night, do not eat for 2 hours before bedtime. · Take antacids like Mylanta, Maalox, Rolaids, or Tums. Do not take antacids that have sodium bicarbonate. Care for varicose veins · Varicose veins are blood vessels that stretch out with the extra blood during pregnancy. Your legs may ache or throb. Most varicose veins will go away after the birth. · Avoid standing for long periods of time. Sit with your legs crossed at the ankles, not the knees. · Sit with your feet propped up. · Avoid tight clothing or stockings. Wear support hose. · Exercise regularly. Try walking for at least 30 minutes a day. Where can you learn more? Go to http://abelardo-james.info/. Enter V426 in the search box to learn more about \"Weeks 30 to 32 of Your Pregnancy: Care Instructions. \" Current as of: March 16, 2017 Content Version: 11.4 © 0981-9386 [employer]. Care instructions adapted under license by [employer] (which disclaims liability or warranty for this information). If you have questions about a medical condition or this instruction, always ask your healthcare professional. [place] [address] any warranty or liability for your use of this information. Semanas 30 a 32 de saenz embarazo: Instrucciones de cuidado - [ Sussy Breech 30 to 28 of Your Pregnancy: Care Instructions ] Instrucciones de cuidado Naomi Simms a los últimos meses de embarazo. A estas [place], saenz bebé en verdad comienza a tener la apariencia de un bebé, con christin y piel rellenita. A medida que entra en las últimas semanas de BabyGlowz, usted comenzará a caer en la realidad de que va a tener un bebé. Starla es el momento de elegir un nombre, ordenar saenz casa, organizar un cuarto de niños seguro y buscar atención infantil de calidad, de ser necesaria. Hacer esto con anterioridad le permitirá concentrarse en cuidar y disfrutar de saenz bebé. También podría hacer lennox visita a la venkat de partos del hospital para Janett Dickinson mejor idea sobre qué esperar mientras está en el hospital. 
Heather estos últimos meses, es muy importante que se cuide y preste atención a lo que saenz cuerpo necesita.  Si saenz médico le dice que está willem que trabaje, no se exija demasiado. Siga las recomendaciones proporcionadas en esta hoja de cuidados para aliviar la acidez estomacal y [address] várices. Si todavía no le musa aplicado la vacuna Tdap (tétanos, difteria y tos [place]) ayaz stefan [facility], hable con saenz médico acerca de aplicársela. Linnette Farrah a proteger a saenz recién nacido contra la infección por tos ferina. La atención de seguimiento es lennox parte clave de saenz tratamiento y seguridad. Asegúrese de hacer y acudir a todas las citas, y llame a saenz médico si está teniendo problemas. También es lennox buena idea saber los resultados de los exámenes y mantener lennox lista de los medicamentos que roxana. Cómo puede cuidarse en el [facility]? Preste atención a los movimientos de saenz bebé · Debería sentir que saenz bebé se mueve varias veces al día. · Saenz bebé ahora cambia menos de posición, y patea y da golpes con más frecuencia. · Saenz bebé duerme de 20 a 45 minutos cada vez y [address] en determinados momentos del día. · Si saenz médico quiere que cuente las patadas de saenz bebé: ¨ Vacíe saenz vejiga y acuéstese de lado o recuéstese en lennox silla cómoda. ¨ Anote la hora inicial. 
¨ Preste atención solo a los movimientos de saenz bebé. Cuente todos los movimientos, excepto los del hipo. ¨ Después de que haya contado 10 movimientos, anote la hora. ¨ Anote cuántos minutos le llevaron a saenz bebé los 10 movimientos. ¨ Si después de lennox hora no ha registrado 10 movimientos, coma o selma algo, y luego cuente por otra hora. Si no registra 10 movimientos en cualquiera de las horas, llame a saenz médico. 
Alivie la acidez estomacal 
· Coma comidas pequeñas y frecuentes. · No coma chocolate, menta ni comidas muy picantes. Evite las bebidas con cafeína, rajat el café, el té y las sodas. · Evite doblarse hacia adelante o acostarse después de comer. · Duc lennox caminata corta después de comer.  
· Si tiene problemas de acidez estomacal ayaz la noche, no coma por 2 horas antes de acostarse. · [place] antiácidos, rajat Mylanta, Maalox, Rolaids o Tums. No tome antiácidos que contengan bicarbonato de sodio. [address] · Las várices son vasos sanguíneos que se dilatan debido a la mayor cantidad de fabrice ayaz el embarazo. Puede tener dolor o palpitaciones en las piernas. La mayoría de las várices desaparecerán después del [place]. · Evite estar garay ayaz mucho tiempo. Siéntese con las piernas cruzadas a la altura de los tobillos, no de las rodillas. · Siéntese con los pies elevados. · Evite usar ropa o medias ajustadas. Use medias de compresión. · Duc ejercicio con regularidad. Trate de caminar por lo menos 30 minutos al día. Dónde puede encontrar más información en inglés? Alex File a http://abelardo-james.info/. Mary Jo Garcia P716 en la búsqueda para aprender más acerca de \"Semanas 30 a 28 de saenz embarazo: Instrucciones de cuidado - [ Jewell Fears 30 to 28 of Your Pregnancy: Care Instructions ]. \" 
Revisado: 16 marzo, 2017 Versión del contenido: 11.4 © 2395-3727 [employer]. Las instrucciones de cuidado fueron adaptadas bajo licencia por [employer] (which disclaims liability or warranty for this information). Si usted tiene [place] afección médica o sobre estas instrucciones, siempre pregunte a saenz profesional de bryanna. Healthwise, Incorporated niega toda garantía o responsabilidad por saenz uso de esta información. Introducing [facility]! Tabatha Stakrey introduces IROCKE patient portal. Now you can access parts of your medical record, email your doctor's office, and request medication refills online. 1. In your internet browser, go to [email or web address] 2. Click on the First Time User? Click Here link in the Sign In box. You will see the New Member Sign Up page. 3. Enter your IROCKE Access Code exactly as it appears below.  You will not need to use this code after youve completed the sign-up process. If you do not sign up before the expiration date, you must request a new code. · Yieldbot Access Code: [email or web address] Expires: 5/28/2018  6:09 PM 
 
4. Enter the last four digits of your Social Security Number (xxxx) and Date of Birth (mm/dd/yyyy) as indicated and click Submit. You will be taken to the next sign-up page. 5. Create a Yieldbot ID. This will be your Yieldbot login ID and cannot be changed, so think of one that is secure and easy to remember. 6. Create a Yieldbot password. You can change your password at any time. 7. Enter your Password Reset Question and Answer. This can be used at a later time if you forget your password. 8. Enter your e-mail address. You will receive e-mail notification when new information is available in [address]. 9. Click Sign Up. You can now view and download portions of your medical record. 10. Click the Download Summary menu link to download a portable copy of your medical information. If you have questions, please visit the Frequently Asked Questions section of the Yieldbot website. Remember, Yieldbot is NOT to be used for urgent needs. For medical emergencies, dial 911. Now available from your iPhone and Android! Please provide this summary of care documentation to your next provider. Your primary care clinician is listed as Fly Carlin. If you have any questions after today's visit, please call [phone].

## 2018-04-06 NOTE — PROGRESS NOTES
Chief Complaint   Patient presents with    Routine Prenatal Visit     30w6d     1. Have you been to the ER, urgent care clinic since your last visit? Hospitalized since your last visit? No    2. Have you seen or consulted any other health care providers outside of the [facility] since your last visit? Include any pap smears or colon screening.  No

## 2018-04-07 LAB — BACTERIA UR CULT: NO GROWTH

## 2018-04-20 ENCOUNTER — ROUTINE PRENATAL (OUTPATIENT)
Dept: FAMILY MEDICINE CLINIC | Age: 28
End: 2018-04-20

## 2018-04-20 VITALS
RESPIRATION RATE: 16 BRPM | HEIGHT: 62 IN | DIASTOLIC BLOOD PRESSURE: 83 MMHG | BODY MASS INDEX: 38.46 KG/M2 | SYSTOLIC BLOOD PRESSURE: 119 MMHG | OXYGEN SATURATION: 99 % | WEIGHT: 209 LBS | TEMPERATURE: 98.1 F | HEART RATE: 83 BPM

## 2018-04-20 DIAGNOSIS — Z34.93 PREGNANT AND NOT YET DELIVERED IN THIRD TRIMESTER: Primary | ICD-10-CM

## 2018-04-20 DIAGNOSIS — Z87.51 HISTORY OF PRETERM DELIVERY: ICD-10-CM

## 2018-04-20 DIAGNOSIS — O99.213 OBESITY AFFECTING PREGNANCY IN THIRD TRIMESTER: ICD-10-CM

## 2018-04-20 DIAGNOSIS — Z23 ENCOUNTER FOR IMMUNIZATION: ICD-10-CM

## 2018-04-20 LAB
BILIRUB UR QL STRIP: NEGATIVE
GLUCOSE UR-MCNC: NEGATIVE MG/DL
KETONES P FAST UR STRIP-MCNC: NEGATIVE MG/DL
PH UR STRIP: 7.5 [PH] (ref 4.6–8)
PROT UR QL STRIP: NEGATIVE
SP GR UR STRIP: 1.02 (ref 1–1.03)
UA UROBILINOGEN AMB POC: NORMAL (ref 0.2–1)
URINALYSIS CLARITY POC: NORMAL
URINALYSIS COLOR POC: YELLOW
URINE BLOOD POC: NEGATIVE
URINE LEUKOCYTES POC: NORMAL
URINE NITRITES POC: NEGATIVE

## 2018-04-20 NOTE — MR AVS SNAPSHOT
[address] 
[phone] Patient: Bette Agrawal MRN: [id number] WML:[id number] Visit Information Derl Geo Arora Personal Médico Departamento Teléfono del Dep. Número de visita 2018  1:30 PM Bhargav Camilo Mariposa Munoz [phone] [id number] Follow-up Instructions Return in about 2 weeks (around 2018). 2018  1:45 PM  
OB VISIT with Bhargav Camilo MD  
[address] ([address] [facility]) Appt Note: routine prenatal  
 [facility]  
[phone]  
  
   
 [address] [place] [address] 3 [facility] 99 [address] Upcoming Health Maintenance Date Due  
 HPV Age 9Y-34Y (1 of 3 - Female 3 Dose Series) 2002 DTaP/Tdap/Td series (1 - Tdap) 2012 PAP AKA CERVICAL CYTOLOGY 2012 OB 3RD TRIMESTER TDAP 3/10/2018 Alergias  Review Complete El: 2018 Por: Dk Goss LPN A partir del:  2018 No Known Allergies Vacunas actuales [id number] el:  2017 Nell Forget Influenza Vaccine (Quad) PF 2017 No revisadas esta visita You Were Diagnosed With   
  
 Beltran Held Pregnant and not yet delivered in third trimester    -  Primary ICD-10-CM: Z34.93 
ICD-9-CM: V22.1 History of  delivery     ICD-10-CM: Z87.51 
ICD-9-CM: V13.21  labor in third trimester with  delivery, single or unspecified fetus     ICD-10-CM: O59.15X0 ICD-9-CM: 233.68 Obesity affecting pregnancy in third trimester     ICD-10-CM: O99.213 ICD-9-CM: 649.13 Partes vitales PS Pulso Temperatura Resp [place] ( percentil de crecimiento) Peso (percentil de crecimiento) 119/83 83 98.1 °F (36.7 °C) (Oral) 16 5' 2\" (1.575 m) 209 lb (94.8 kg) LMP (última lizzie) SpO2 BMI (IMC) Estado obstétrico Estatus de tabaquísmo 08/20/2017 (Exact Date) 99% 38.23 kg/m2 Pregnant Never Smoker BMI and BSA Data Body Mass Index Body Surface Area  
 38.23 kg/m 2 2.04 m 2 Preferred Pharmacy Pharmacy Name Phone [address], [address] [place] [address] [phone] Burch lista de medicamentos actualizada Maryanne Adkinss actualizada 4/20/18  2:43 PM.  Tyrell Augustine use burch lista de medicamentos más reciente. prenatal vit-iron fumarate-fa 27 mg iron- 0.8 mg Tab tablet Take 1 Tab by mouth daily. Hicimos lo siguiente AMB POC URINALYSIS DIP STICK AUTO W/O MICRO [28939 CPT(R)] Instrucciones de seguimiento Return in about 2 weeks (around 5/4/2018). Instrucciones para el Paciente Semanas 32 a 34 de burch embarazo: Instrucciones de cuidado - [ [place] Joni 32 to 29 of Your Pregnancy: Care Instructions ] Instrucciones de cuidado Ayaz las últimas semanas de ubrch Ettie Oven, usted podría sentir más christiana y [place]. Es importante que descanse cuando pueda. Burch bebé en crecimiento está ejerciendo más presión sobre burch vejiga. Por eso, ghulam vez necesite orinar con más frecuencia. Las hemorroides también son comunes. Estas son venas en la cirilo del recto que causan dolor y comezón. En la semana 36, a la mayoría de las [employer] un análisis para detectar el estreptococo del dyana B (GBS, por herber siglas en inglés). El GBS es lennox bacteria común que puede vivir en la vagina y el recto. Podría enfermar a burch bebé después del nacimiento. Si el [employer] positivo, le darán antibióticos ayaz el [place]. Estos prevendrán que el bebé se contagie con la bacteria. Podría convenirle hablar con burch médico sobre poner en un banco la fabrice del cordón umbilical de burch bebé. Esta es la fabrice que queda en el cordón después del nacimiento. Si desea guardar esta fabrice, debe hacer los trámites necesarios con anticipación. No puede decidirlo en el último minuto. Si todavía no le musa aplicado la vacuna Tdap (tétanos, difteria y tos [place]) ayaz stefan [facility], hable con saenz médico acerca de aplicársela. Etelvina Dowse a proteger a saenz recién nacido contra la infección por tos ferina. La atención de seguimiento es lennox parte clave de saenz tratamiento y seguridad. Asegúrese de hacer y acudir a todas las citas, y llame a saenz médico si está teniendo problemas. También es lennox buena idea saber los resultados de los exámenes y mantener lennox lista de los medicamentos que roxana. Cómo puede cuidarse en el hogar? [place] hemorroides · Aumente en saenz dieta la cantidad de líquidos, frutas, verduras y [place]. [place] ayudará a SANJEEV [employer] · Evite estar sentada ayaz demasiado tiempo. Recuéstese sobre el lado heavenly varias veces al día. · Límpiese con papel higiénico suave y húmedo. O puede utilizar compresas de infusión de hamamelis [facility] (\"witch hazel\") o toallas de higiene personal. 
· Si tiene malestar, pruebe a usar compresas de hielo. O puede hacer genevieve de asiento tibios. Hágalos ayaz 20 minutos por vez, según lo necesite. · Use lennox crema con hidrocortisona para el dolor y la picazón. Ernestine Has son Anusol y Preparation H Hydrocortisone. · Pregúntele a saenz médico si puede asmita un ablandador de heces de venta wilmar. Considere el amamantamiento · Los expertos recomiendan que las mujeres amamanten por 1 año o [place]. La leche materna es el mejor alimento para los bebés. · A los bebés les resulta más fácil digerir la AT[employer]T materna que la AT[employer]T de [place]. Y siempre está disponible, tiene la temperatura ideal y es gratuita. · En general, los bebés que se alimentan con [address] son más sanos que los bebés que se alimentan con leche de [place]. [place] son menos propensos a tener infecciones de oído, resfriados, diarrea y neumonía. ¨ Los bebés que solo mohsen AT&T materna son menos propensos a desarrollar asma y alergias. [place] son menos propensos a ser obesos. [place] son menos propensos a desarrollar diabetes o enfermedades del corazón. · [employer] a airam bebés tienen menos sangrado después del [place]. Airam úteros también recobran saenz tamaño normal más rápido. · Algunas mujeres que amamantan bajan de David rápido. Elaborar leche quema calorías. · El amamantamiento puede disminuir el riesgo de tener cáncer de seno, cáncer de ovarios y osteoporosis. Decida sobre la circuncisión para los niños · Al asmita esta decisión, podría ser de ayuda pensar en airam tradiciones personales, religiosas y familiares. Es saenz decisión si desea conservar el pene de saenz hijo natural o circuncidar a saenz hijo. · Si decide que le gustaría que circuncidaran a saenz bebé, hable con saenz médico. Discuta cualquier inquietud que tenga sobre el dolor. También puede hablar acerca de airam preferencias para la anestesia. Dónde puede encontrar más información en inglés? Cindy Martínez a [email or web address] [place] S717 en la búsqueda para aprender más acerca de \"Semanas 28 a 29 de saenz embarazo: Instrucciones de cuidado - [ Cuca Carney 32 to 29 of Your Pregnancy: Care Instructions ]. \" 
Revisado: 16 marzo, 2017 Versión del contenido: 11.4 © 6188-8877 Healthwise, Incorporated. Las instrucciones de cuidado fueron adaptadas bajo licencia por Good Help Connections (which disclaims liability or warranty for this information). Si usted tiene [place] afección médica o sobre estas instrucciones, siempre pregunte a saenz profesional de bryanna. Healthwise, Incorporated niega toda garantía o responsabilidad por saenz uso de esta información. Aprenda cuándo llamar a saenz médico ayaz el embarazo (después de 20 semanas) - [ Learning About When to Call Your Doctor During Pregnancy (After 20 Weeks) ] Instrucciones de cuidado Es normal que tenga inquietudes acerca de lo que podría ser un problema ayaz el [facility]. Aunque la mayoría de las mujeres embarazadas no tienen ningún problema grave, es importante saber cuándo llamar a saenz médico si tiene determinados síntomas o señales de trabajo de [place]. Estas son algunas sugerencias generales. Saenz médico puede darle más información sobre cuándo llamar. Cuándo llamar a saenz médico (después de 20 semanas) Llame al 911 en cualquier momento que sospeche que puede necesitar atención de [place]. Por ejemplo, llame si: · Tiene sangrado vaginal intenso. · Tiene dolor repentino e intenso en el abdomen. · Se desmayó (perdió el conocimiento). · Tiene lennox convulsión. · Ve o siente el cordón umbilical. 
· Jennifer que está a punto de pineda a christiano a saenz bebé y no puede llegar en forma odell al hospital. 
Mavis Michelle a saenz médico ahora mismo o busque atención médica inmediata si: · Tiene sangrado vaginal. 
· Tiene dolor en el abdomen. · Tiene fiebre. · Tiene síntomas de preeclampsia, tales rajat: 
¨ Hinchazón repentina de la davy, las gray o los pies. ¨ Problemas nuevos con la visión (rajat oscurecimiento de la visión o visión borrosa). ¨ Dolor de rita intenso. · Tiene lennox pérdida repentina de líquido por la vagina. (Piensa que rompió la jv). · Jennifer que puede estar en [place]. [place] significa que usted ha tenido al menos 4 contracciones en 20 minutos o al menos 8 contracciones en [employer]. · Nota que saenz bebé ha dejado de moverse o lo hace mucho menos de lo habitual. 
· Tiene síntomas de lennox infección del tracto urinario. Estos pueden incluir: ¨ Dolor o ardor al orinar. ¨ Necesidad de orinar con frecuencia sin poder eliminar mucha orina. ¨ Dolor en el flanco, que se encuentra hamilton debajo de la caja torácica y [place] de la cintura en un lado de la espalda. ¨ Jonathan en la orina.  
Preste especial atención a los cambios en saenz bryanna y asegúrese de comunicarse con saenz médico si: 
 · Tiene flujo vaginal con un olor desagradable. · Tiene cambios en la piel, tales rajat: 
¨ Salpullido. ¨ Comezón. ¨ Color amarillento en la piel. · Tiene otras inquietudes acerca de saenz embarazo. Si tiene signos de trabajo de parto al llegar a las [address] Si tiene señales de [place] a las [address] [facility] o [address] [facility], es posible que saenz médico le diga que llame cuando saenz trabajo de parto se vuelva [place] [facility]. Los síntomas del trabajo de parto activo incluyen: · Contracciones que son regulares. · Contracciones a intervalos de menos de 5 minutos. · Contracciones ayaz las cuales es difícil hablar. La atención de seguimiento es lennox parte clave de saenz tratamiento y seguridad. Asegúrese de hacer y acudir a todas las citas, y llame a saenz médico si está teniendo problemas. También es lennox buena idea saber los resultados de los exámenes y mantener lennox lista de los medicamentos que roxana. Dónde puede encontrar más información en inglés? Sigrid Troy a [email or web address] Escriba I356 en la búsqueda para aprender más acerca de \"Aprenda cuándo llamar a saenz médico ayaz el embarazo (después de 20 semanas) - [ Learning About When to Call Your Doctor During Pregnancy (After 20 Weeks) ]. \" 
Revisado: 2017 Versión del contenido: 11.4 © 0537-3606 Healthwise, Incorporated. Las instrucciones de cuidado fueron adaptadas bajo licencia por Good Help Connections (which disclaims liability or warranty for this information). Si usted tiene [place] afección médica o sobre estas instrucciones, siempre pregunte a saenz profesional de bryanna. [facility] Incorporated niega toda garantía o responsabilidad por saenz uso de esta información. El [place] prematuro: Instrucciones de cuidado - [  Labor: Care Instructions ] Instrucciones de cuidado El [place] de parto prematuro es cuando el trabajo de jeanette Burton la semana 20 y 39 del embarazo. Un embarazo a término dura de 37 a 42 semanas. En el trabajo de [place], el útero se contrae para abrir el swapnil uterino. Esta es la primera fase del alumbramiento. El [place] de parto prematuro puede deberse a un problema con el bebé, la madre o ambos. A menudo se desconoce la causa. En algunos casos, los médicos emplean medicamentos para tratar de demorar el trabajo de parto hasta la semana 29 o más de [facility]. En esta etapa, un bebé ha crecido lo suficiente así que es menos probable que se presenten problemas. En algunos casos, [employer] de lennox infección grave, nacer de Shandra prematura es más heidi para el bebé. El tratamiento dependerá de la etapa del embarazo en la que se encuentre, de saenz bryanna y la del bebé. La atención de seguimiento es lennox parte clave de saenz tratamiento y seguridad. Asegúrese de hacer y acudir a todas las citas, y llame a saenz médico si está teniendo problemas. También es lennox buena idea saber los resultados de los exámenes y mantener lennox lista de los medicamentos que roxana. Cómo puede cuidarse en el hogar? · Si saezn médico le recetó medicamentos, tómelos exactamente según las indicaciones. Llame a saenz médico si katherine estar teniendo un problema con saenz medicamento. · Descanse hasta que saenz médico le indique qué actividades puede hacer. Saenz médico le dirá si debe permanecer en cama la mayor parte del [place]. Es posible que necesite planear el cuidado de los niños pequeños si los tiene. · No mantenga relaciones sexuales hasta que saenz médico le diga que es seguro. · Use toallas sanitarias, corrine no tampones, si tiene sangrado vaginal. 
· Asegúrese de beber abundantes líquidos. La deshidratación puede causar contracciones. Si tiene lennox enfermedad renal, cardíaca o hepática y tiene que [place]'s líquidos, hable con saenz médico antes de aumentar saenz consumo. · No fume ni permita que otros lo mc cerca de usted.  Si necesita ayuda para dejar de fumar, hable con saenz médico sobre programas y medicamentos para dejar de fumar. Estos pueden aumentar herber probabilidades de dejar el hábito para siempre. Cuándo debe pedir ayuda? Llame al 911 en cualquier momento que considere que necesita atención de [place]. Por ejemplo, llame si: 
? · Se desmayó (perdió el conocimiento). ? · Tiene sangrado vaginal intenso. ? · Tiene dolor intenso en el abdomen o la pelvis. ? · Le sale abundante líquido o goteo de la vagina y sabe o katherine que el cordón umbilical se está introduciendo en la vagina. Si esto sucede, arrodíllese de inmediato, de ghulam forma que herber nalgas (glúteos) estén más altas que saenz rita. [place] disminuirá la presión sobre el cordón umbilical hasta que llegue la [facility]. ?Llame a saenz médico ahora mismo o busque atención médica inmediata si: 
? · Tiene señales de preeclampsia, tales rajat: 
¨ Hinchazón repentina de la davy, las gray o los pies. ¨ Problemas nuevos con la visión (rajat oscurecimiento de la visión o visión borrosa). ¨ Dolor de rita intenso. ? · Tiene cualquier sangrado vaginal.  
? · Tiene dolor abdominal o cólicos (retortijones). ? · Tiene fiebre. ? · Ha tenido contracciones regulares (con o sin dolor) por Rebecca Tyrone. [place] significa que tiene 6 o más contracciones en 1 hora después de [place] de posición y asmita líquidos. ? · Tiene lennox pérdida repentina de líquido por la vagina. ? · Tiene dolor en la parte baja de la espalda o presión en la pelvis que no desaparece. ? · Nota que saenz bebé ha dejado de moverse o se mueve mucho menos de lo normal. ?Preste especial atención a los cambios en saenz bryanna y asegúrese de comunicarse con saenz médico si tiene algún problema. Dónde puede encontrar más información en inglés? Kaylin Penta a [email or web address]-james.[email or web address] Escriba Q400 en la búsqueda para aprender más acerca de \"El trabajo de parto prematuro: Instrucciones de cuidado - [  Labor: Care Instructions ]. \" Revisado: 16 marzo, 2017 Versión del contenido: 11.4 © 8571-8524 Healthwise, Incorporated. Las instrucciones de cuidado fueron adaptadas bajo licencia por Good Help Connections (which disclaims liability or warranty for this information). Si usted tiene [place] afección médica o sobre estas instrucciones, siempre pregunte a saenz profesional de bryanna. Healthwise, Incorporated niega toda garantía o responsabilidad por saenz uso de esta información. Introducing [facility] [place] [facility]! [facility] introduces DearLocal patient portal. Now you can access parts of your medical record, email your doctor's office, and request medication refills online. 1. In your internet browser, go to [email or web address] 2. Click on the First Time User? Click Here link in the Sign In box. You will see the New Member Sign Up page. 3. Enter your DearLocal Access Code exactly as it appears below. You will not need to use this code after youve completed the sign-up process. If you do not sign up before the expiration date, you must request a new code. · DearLocal Access Code: [email or web address] Expires: 5/28/2018  6:09 PM 
 
4. Enter the last four digits of your Social Security Number (xxxx) and Date of Birth (mm/dd/yyyy) as indicated and click Submit. You will be taken to the next sign-up page. 5. Create a DearLocal ID. This will be your DearLocal login ID and cannot be changed, so think of one that is secure and easy to remember. 6. Create a DearLocal password. You can change your password at any time. 7. Enter your Password Reset Question and Answer. This can be used at a later time if you forget your password. 8. Enter your e-mail address. You will receive e-mail notification when new information is available in [address] 9. Click Sign Up. You can now view and download portions of your medical record. 10. Click the Download Summary menu link to download a portable copy of your medical information. If you have questions, please visit the Frequently Asked Questions section of the [email or web address] website. Remember, KnowRe is NOT to be used for urgent needs. For medical emergencies, dial 911. Now available from your iPhone and Android! Please provide this summary of care documentation to your next provider. Your primary care clinician is listed as Madelin Schneider. If you have any questions after today's visit, please call [phone].

## 2018-04-20 NOTE — PATIENT INSTRUCTIONS
Semanas 32 a 34 de burch embarazo: Instrucciones de cuidado - [ Guadelupe Bevels 32 to 29 of Your Pregnancy: Care Instructions ]  Instrucciones de cuidado    Ayaz las últimas semanas de burch eulogio Hutchison podría sentir más christiana y [place]. Es importante que descanse cuando pueda. Burch bebé en crecimiento está ejerciendo más presión sobre burch vejiga. Por eso, ghulam vez necesite orinar con más frecuencia. Las hemorroides también son comunes. Estas son venas en la cirilo del recto que causan dolor y comezón. En la semana 36, a la mayoría de las [employer] un análisis para detectar el estreptococo del dyana B (GBS, por herber siglas en inglés). El GBS es lennox bacteria común que puede vivir en la vagina y el recto. Podría enfermar a burch bebé después del nacimiento. Si el [employer] positivo, le darán antibióticos ayaz el [place]. Estos prevendrán que el bebé se contagie con la bacteria. Podría convenirle hablar con burch médico sobre poner en un banco la fabrice del cordón umbilical de burch bebé. Esta es la fabrice que queda en el cordón después del nacimiento. Si desea guardar esta fabrice, debe hacer los trámites necesarios con anticipación. No puede decidirlo en el último minuto. Si todavía no le musa aplicado la vacuna Tdap (tétanos, difteria y tos [place]) ayaz stefan Kianna, hable con burch médico acerca de aplicársela. Towana Cozier a proteger a burch recién nacido contra la infección por tos ferina. La atención de seguimiento es lennox parte clave de burch tratamiento y seguridad. Asegúrese de hacer y acudir a todas las citas, y llame a burch médico si está teniendo problemas. También es lennox buena idea saber los resultados de los exámenes y mantener lennox lista de los medicamentos que roxana. ¿Cómo puede cuidarse en el hogar? Alivio de las hemorroides  · Aumente en burch dieta la cantidad de líquidos, frutas, verduras y [place]. [place] ayudará a [place]. · Evite estar sentada ayaz demasiado tiempo.  Recuéstese sobre el lado heavenly varias veces al día. · Límpiese con papel higiénico suave y húmedo. O puede utilizar compresas de infusión de hamamelis [facility] (\"witch hazel\") o toallas de higiene personal.  · Si tiene malestar, pruebe a usar compresas de hielo. O puede hacer genevieve de asiento tibios. Hágalos ayaz 20 minutos por vez, según lo necesite. · Use lennox crema con hidrocortisona para el dolor y la picazón. Alysha Cleaves son Anusol y Preparation H Hydrocortisone. · Pregúntele a saenz médico si puede asmita un ablandador de heces de venta wilmar. Considere el amamantamiento  · Los expertos recomiendan que las mujeres amamanten por Toni Horne. La leche materna es el mejor alimento para los bebés. · A los bebés les resulta más fácil digerir la Sheela Lewis materna que la Sheela Lewis de [place]. Y siempre está disponible, tiene la temperatura ideal y es gratuita. · En general, los bebés que se alimentan con [address] son más sanos que los bebés que se alimentan con leche de [place]. [place] son menos propensos a tener infecciones de oído, resfriados, diarrea y neumonía. ¨ Los bebés que solo mohsen Sheela Lewis materna son menos propensos a desarrollar asma y alergias. [place] son menos propensos a ser obesos. [place] son menos propensos a desarrollar diabetes o enfermedades del corazón. · [employer] a airam bebés tienen menos sangrado después del [place]. Airam úteros también recobran saenz tamaño normal más rápido. · Algunas mujeres que amamantan bajan de David rápido. Elaborar leche quema calorías. · El amamantamiento puede disminuir el riesgo de tener cáncer de seno, cáncer de ovarios y osteoporosis. Decida sobre la circuncisión para los niños  · Al asmita esta decisión, podría ser de ayuda pensar en airam tradiciones personales, religiosas y familiares.  Es saenz decisión si desea conservar el pene de saenz hijo natural o circuncidar a saenz hijo.  · Si decide que le gustaría que circuncidaran a burch bebé, hable con burch médico. Discuta cualquier inquietud que tenga sobre el dolor. También puede hablar acerca de herber preferencias para la anestesia. ¿Dónde puede encontrar más información en inglés? Julisa Chacon a [email or web address]-james.info/. Steven Campa R865 en la búsqueda para aprender más acerca de \"Semanas 28 a 29 de burch embarazo: Instrucciones de cuidado - [ Ofilia Lemme 32 to 29 of Your Pregnancy: Care Instructions ]. \"  Revisado: 16 marzo, 2017  Versión del contenido: 11.4  © 4991-5383 Healthwise, Incorporated. Las instrucciones de cuidado fueron adaptadas bajo licencia por Good Help Connections (which disclaims liability or warranty for this information). Si usted tiene [place] afección médica o sobre estas instrucciones, siempre pregunte a burch profesional de bryanna. Healthwise, Incorporated niega toda garantía o responsabilidad por burch uso de esta información. Gevena Canelo a burch médico ayaz el embarazo (después de 20 semanas) - [ Learning About When to Call Your Doctor During Pregnancy (After 20 Weeks) ]  Instrucciones de cuidado  Es normal que tenga inquietudes acerca de lo que podría ser un problema ayaz el [facility]. Aunque la mayoría de las mujeres embarazadas no tienen ningún problema grave, es importante saber cuándo llamar a burch médico si tiene determinados síntomas o señales de trabajo de [place]. Estas son algunas sugerencias generales. Burch médico puede darle más información sobre cuándo llamar. Cuándo llamar a burch médico (después de 20 semanas)  Llame al 911 en cualquier momento que sospeche que puede necesitar atención de [place]. Por ejemplo, llame si:  · Tiene sangrado vaginal intenso. · Tiene dolor repentino e intenso en el abdomen. · Se desmayó (perdió el conocimiento). · Tiene lennox convulsión.   · Ve o siente el cordón umbilical.  · Jennifer que está a punto de pineda a christiano a burch bebé y no puede llegar en forma odell al hospital.  Rayo Sepulveda a saenz médico ahora mismo o busque atención médica inmediata si:  · Tiene sangrado vaginal.  · Tiene dolor en el abdomen. · Tiene fiebre. · Tiene síntomas de preeclampsia, tales rajat:  ¨ Hinchazón repentina de la davy, las gray o los pies. ¨ Problemas nuevos con la visión (rajat oscurecimiento de la visión o visión borrosa). ¨ Dolor de rita intenso. · Tiene lennox pérdida repentina de líquido por la vagina. (Piensa que rompió la jv). · Jennifer que puede estar en [place]. [place] significa que usted ha tenido al menos 4 contracciones en 20 minutos o al menos 8 contracciones en [place]. · Nota que saenz bebé ha dejado de moverse o lo hace mucho menos de lo habitual.  · Tiene síntomas de lennox infección del tracto urinario. Estos pueden incluir:  ¨ Dolor o ardor al orinar. ¨ Necesidad de orinar con frecuencia sin poder eliminar mucha orina. ¨ Dolor en el flanco, que se encuentra hamilton debajo de la caja torácica y [place] de la cintura en un lado de la espalda. ¨ Jonathan en la orina. Preste especial atención a los cambios en saenz bryanna y asegúrese de comunicarse con saenz médico si:  · Tiene flujo vaginal con un olor desagradable. · Tiene cambios en la piel, tales rajat:  ¨ Salpullido. ¨ Comezón. ¨ Color amarillento en la piel. · Tiene otras inquietudes acerca de saenz embarazo. Si tiene signos de trabajo de parto al llegar a las 37 [address] o más  Si tiene señales de [place] de parto a las 37 semanas o [place], es posible que saenz médico le diga que llame cuando saenz trabajo de parto se vuelva más [place]. Los síntomas del trabajo de parto activo incluyen:  · Contracciones que son regulares. · Contracciones a intervalos de menos de 5 minutos. · Contracciones ayaz las cuales es difícil hablar. La atención de seguimiento es lennox parte clave de saenz tratamiento y seguridad. Asegúrese de hacer y acudir a todas las citas, y llame a sanez médico si está teniendo problemas.  También es Thersa Beat buena idea saber los [employer] exámenes y mantener lennox lista de los medicamentos que roxana. ¿Dónde puede encontrar más información en inglés? Sigrid Troy a [email or web address]-james.info/. Tal W780 en la búsqueda para aprender más acerca de \"Aprenda cuándo llamar a saenz médico ayaz el embarazo (después de 20 semanas) - [ Learning About When to Call Your Doctor During Pregnancy (After 20 Weeks) ]. \"  Revisado: 2017  Versión del contenido: 11.4  © 8494-8112 Healthwise, Incorporated. Las instrucciones de cuidado fueron adaptadas bajo licencia por [employer] [employer] (which disclaims liability or warranty for this information). Si usted tiene [place] afección médica o sobre estas instrucciones, siempre pregunte a saenz profesional de bryanna. Healthwise, Incorporated niega toda garantía o responsabilidad por saenz uso de esta información. El [place] prematuro: Instrucciones de cuidado - [  Labor: Care Instructions ]  Instrucciones de cuidado    El trabajo de parto prematuro es cuando el trabajo de parto empieza entre la semana 20 y 39 del [facility]. Un embarazo a término dura de 37 a 42 semanas. En el trabajo de [place], el útero se contrae para abrir el swapnil uterino. Esta es la primera fase del alumbramiento. El [place] de parto prematuro puede deberse a un problema con el bebé, la madre o ambos. A menudo se desconoce la causa. En algunos casos, los médicos emplean medicamentos para tratar de demorar el trabajo de parto hasta la semana 29 o más de [place]. En esta etapa, un bebé ha crecido lo suficiente así que es menos probable que se presenten problemas. En algunos casos, Dee Eliid de lennox infección grave, nacer de [place] prematura es más heidi para el bebé. El tratamiento dependerá de la etapa del embarazo en la que se encuentre, de saenz bryanna y la del bebé. La atención de seguimiento es lennox parte clave de saenz tratamiento y seguridad.  Asegúrese de hacer y acudir a todas las citas, y llame a saenz médico si está teniendo problemas. También es lennox buena idea saber los resultados de los exámenes y mantener lennox lista de los medicamentos que roxana. ¿Cómo puede cuidarse en el hogar? · Si saenz médico le recetó medicamentos, tómelos exactamente según las indicaciones. Llame a saenz médico si katherine estar teniendo un problema con saenz medicamento. · Descanse hasta que saenz médico le indique qué actividades puede hacer. Saenz médico le dirá si debe permanecer en cama la mayor parte del [place]. Es posible que necesite planear el cuidado de los niños pequeños si los tiene. · No mantenga relaciones sexuales hasta que asenz médico le diga que es seguro. · Use toallas sanitarias, corrine no tampones, si tiene sangrado vaginal.  · Asegúrese de beber abundantes líquidos. La deshidratación puede causar contracciones. Si tiene lennox enfermedad renal, cardíaca o hepática y tiene que [place]'s líquidos, hable con saenz médico antes de aumentar saenz consumo. · No fume ni permita que otros lo mc cerca de usted. Si necesita ayuda para dejar de fumar, hable con saenz médico sobre programas y medicamentos para dejar de fumar. Estos pueden aumentar herber probabilidades de dejar el hábito para siempre. ¿Cuándo debe pedir ayuda? Llame al 911 en cualquier momento que considere que necesita atención de [place]. Por ejemplo, llame si:  ? · Se desmayó (perdió el conocimiento). ? · Tiene sangrado vaginal intenso. ? · Tiene dolor intenso en el abdomen o la pelvis. ? · Le sale abundante líquido o goteo de la vagina y sabe o katherine que el cordón umbilical se está introduciendo en la vagina. Si esto sucede, arrodíllese de inmediato, de ghulam forma que herber nalgas (glúteos) estén más altas que saenz rita. [place] disminuirá la presión sobre el cordón umbilical hasta que llegue la [facility]. ?Llame a saenz médico ahora mismo o busque atención médica inmediata si:  ?  · Tiene señales de preeclampsia, tales rajat:  ¨ Hinchazón repentina de la davy, las gray o los pies. ¨ Problemas nuevos con la visión (rajat oscurecimiento de la visión o visión borrosa). ¨ Dolor de rita intenso. ? · Tiene cualquier sangrado vaginal.   ? · Tiene dolor abdominal o cólicos (retortijones). ? · Tiene fiebre. ? · Ha tenido contracciones regulares (con o sin dolor) por [place]. [place] significa que tiene 6 o más contracciones en 1 hora después de [place] de posición y asmita líquidos. ? · Tiene lennox pérdida repentina de líquido por la vagina. ? · Tiene dolor en la parte baja de la espalda o presión en la pelvis que no desaparece. ? · Nota que saenz bebé ha dejado de moverse o se mueve mucho menos de lo normal.   ?Preste especial atención a los cambios en saenz bryanna y asegúrese de comunicarse con saenz médico si tiene algún problema. ¿Dónde puede encontrar más información en inglés? Katharine Arechiga a [email or web address]-james.info/. Escriba Q400 en la búsqueda para aprender más acerca de \"El trabajo de parto prematuro: Instrucciones de cuidado - [  Labor: Care Instructions ]. \"  Revisado: 2017  Versión del contenido: 11.4  © 2258-0954 Healthwise, Incorporated. Las instrucciones de cuidado fueron adaptadas bajo licencia por Good Help Connections (which disclaims liability or warranty for this information). Si usted tiene [place] afección médica o sobre estas instrucciones, siempre pregunte a saenz profesional de bryanna. Healthwise, Incorporated niega toda garantía o responsabilidad por saenz uso de esta información.

## 2018-04-20 NOTE — PROGRESS NOTES
Return OB Visit       Subjective:   Candelaria Akins 33 y.o. G5   STEPHANIE: 6/9/2018, by Ultrasound  GA:  32w6d. Fetal movement - yes  Vaginal bleeding - no  Having minimal contractions  LOF - no    Diet - eating well  Exercise - yes  PNV/Other supplements - yes    Allergies- reviewed:   No Known Allergies  Medications- reviewed:   Current Outpatient Prescriptions   Medication Sig    prenatal vit-iron fumarate-fa 27 mg iron- 0.8 mg tab tablet Take 1 Tab by mouth daily. No current facility-administered medications for this visit. Past Medical History- reviewed:  No past medical history on file. Past Surgical History- reviewed:   No past surgical history on file. Social History- reviewed:  Social History     Social History    Marital status: UNKNOWN     Spouse name: N/A    Number of children: N/A    Years of education: N/A     Occupational History    Not on file.      Social History Main Topics    Smoking status: Never Smoker    Smokeless tobacco: Never Used    Alcohol use No    Drug use: No    Sexual activity: Yes     Partners: Male     Other Topics Concern    Not on file     Social History Narrative     Immunizations- reviewed:   Immunization History   Administered Date(s) Administered    Influenza Vaccine (Quad) PF 12/12/2017    Tdap 04/20/2018         Objective:     Visit Vitals    /83    Pulse 83    Temp 98.1 °F (36.7 °C) (Oral)    Resp 16    Ht 5' 2\" (1.575 m)    Wt 209 lb (94.8 kg)    LMP 08/20/2017 (Exact Date)    SpO2 99%    BMI 38.23 kg/m2       Physical Exam:  GENERAL APPEARANCE: alert, well appearing, in no apparent distress  LUNGS: clear to auscultation, no wheezes, rales or rhonchi, symmetric air entry  HEART: regular rate and rhythm, no murmurs  ABDOMEN: soft, nontender, nondistended, no abnormal masses, no epigastric pain, bowel sounds present, fundal height 37 cm, FHT present at 135 bpm  BACK: no CVA tenderness  UTERUS: gravid  EXTREMITIES: no redness or tenderness in the calves or thighs, no edema  NEUROLOGICAL: alert, oriented, normal speech, no focal findings or movement disorder noted      Labs  Recent Results (from the past 12 hour(s))   AMB POC URINALYSIS DIP STICK AUTO W/O MICRO    Collection Time: 18  1:53 PM   Result Value Ref Range    Color (UA POC) Yellow     Clarity (UA POC) Cloudy     Glucose (UA POC) Negative Negative    Bilirubin (UA POC) Negative Negative    Ketones (UA POC) Negative Negative    Specific gravity (UA POC) 1.020 1.001 - 1.035    Blood (UA POC) Negative Negative    pH (UA POC) 7.5 4.6 - 8.0    Protein (UA POC) Negative Negative    Urobilinogen (UA POC) 0.2 mg/dL 0.2 - 1    Nitrites (UA POC) Negative Negative    Leukocyte esterase (UA POC) 1+ Negative         Assessment   Jag Mcnamara 33 y.o. G5   STEPHANIE: 2018, by Ultrasound  GA:  32w6d. ICD-10-CM ICD-9-CM    1. Pregnant and not yet delivered in third trimester Z34.93 V22.1 AMB POC URINALYSIS DIP STICK AUTO W/O MICRO      TETANUS, DIPHTHERIA TOXOIDS AND ACELLULAR PERTUSSIS VACCINE (TDAP), IN INDIVIDS. >=7, IM   2. History of  delivery Z87.51 V13.21 AMB POC URINALYSIS DIP STICK AUTO W/O MICRO      TETANUS, DIPHTHERIA TOXOIDS AND ACELLULAR PERTUSSIS VACCINE (TDAP), IN INDIVIDS. >=7, IM   3.  labor in third trimester with  delivery, single or unspecified fetus O60.14X0 644.21 AMB POC URINALYSIS DIP STICK AUTO W/O MICRO      TETANUS, DIPHTHERIA TOXOIDS AND ACELLULAR PERTUSSIS VACCINE (TDAP), IN INDIVIDS. >=7, IM   4. Obesity affecting pregnancy in third trimester O99.213 649.13 AMB POC URINALYSIS DIP STICK AUTO W/O MICRO      TETANUS, DIPHTHERIA TOXOIDS AND ACELLULAR PERTUSSIS VACCINE (TDAP), IN INDIVIDS. >=7, IM   5.  Encounter for immunization Z23 V03.89 AMB POC URINALYSIS DIP STICK AUTO W/O MICRO      TETANUS, DIPHTHERIA TOXOIDS AND ACELLULAR PERTUSSIS VACCINE (TDAP), IN INDIVIDS. >=7, IM         Plan     Orders Placed This Encounter    TETANUS, DIPHTHERIA TOXOIDS AND ACELLULAR PERTUSSIS VACCINE (TDAP), IN INDIVIDS. >=7, IM    AMB POC URINALYSIS DIP STICK AUTO W/O MICRO       IUP:  - Prenatal Labs: A+, AB screen neg, HBsAg neg, G/C neg, HIV NR, T pal neg, Rubella/VZV immune  - GBS: will check at 35-37 weeks  - Tdap today   - Ultrasound: 02/16 showed single viable IUP, fetal growth is appropriate; EFW is at the 65th percentile. Pregnancy complicated by:  -History of PTL (at ?34 weeks) with 2 subsequent SVDs and one pregnancy with triplets -> placenta problem -> d&c: Continue weekly adolph injections until 36 weeks gestation per [facility]. Reinforced to patient to do injections every week. She forgot to bring [facility] today. - Obesity- 1 Hr GTT done in December due to obesity: 138. Repeat 1hr GTT in previous visit. Normal.     Follow up: RTC in 2 weeks - appt made with Dr. Davonte Liz precautions discussed, including: Regular painful contractions, lasting for greater than one hour, taking your breath away; any vaginal bleeding; any leakage of fluid; or absent or decreased fetal movement. Call M.D. on call if any of these symptoms or signs occur. I have discussed the diagnosis with the patient and the intended plan as seen in the above orders. The patient has received an after-visit summary and questions were answered concerning future plans. I have discussed medication side effects and warnings with the patient as well. Informed pt to return to the office or go to the ER if she experiences vaginal bleeding, vaginal discharge, leaking of fluid, pelvic cramping.     Pt seen and discussed with Dr. Milena Swift (attending physician)      Jorge Parish MD  Family Medicine Resident, PGY-1

## 2018-04-20 NOTE — PROGRESS NOTES
28 w 6 d    Hx  delivery  Receiving [facility]    BMI = 38    I reviewed with the resident the medical history and the resident's findings on the physical examination. I discussed with the resident the patient's diagnosis and concur with the plan.

## 2018-05-04 ENCOUNTER — ROUTINE PRENATAL (OUTPATIENT)
Dept: FAMILY MEDICINE CLINIC | Age: 28
End: 2018-05-04

## 2018-05-04 VITALS
HEIGHT: 62 IN | WEIGHT: 213 LBS | HEART RATE: 95 BPM | BODY MASS INDEX: 39.2 KG/M2 | OXYGEN SATURATION: 99 % | RESPIRATION RATE: 16 BRPM | SYSTOLIC BLOOD PRESSURE: 123 MMHG | TEMPERATURE: 98.2 F | DIASTOLIC BLOOD PRESSURE: 77 MMHG

## 2018-05-04 DIAGNOSIS — Z87.898: ICD-10-CM

## 2018-05-04 DIAGNOSIS — Z34.93 PREGNANT AND NOT YET DELIVERED, THIRD TRIMESTER: Primary | ICD-10-CM

## 2018-05-04 DIAGNOSIS — Z87.51 HISTORY OF PRETERM DELIVERY: ICD-10-CM

## 2018-05-04 DIAGNOSIS — E66.9 OBESITY (BMI 35.0-39.9 WITHOUT COMORBIDITY): ICD-10-CM

## 2018-05-04 LAB
BILIRUB UR QL STRIP: NEGATIVE
GLUCOSE UR-MCNC: NEGATIVE MG/DL
KETONES P FAST UR STRIP-MCNC: NEGATIVE MG/DL
PH UR STRIP: 6 [PH] (ref 4.6–8)
PROT UR QL STRIP: NEGATIVE
SP GR UR STRIP: 1.01 (ref 1–1.03)
UA UROBILINOGEN AMB POC: NORMAL (ref 0.2–1)
URINALYSIS CLARITY POC: CLEAR
URINALYSIS COLOR POC: YELLOW
URINE BLOOD POC: NEGATIVE
URINE LEUKOCYTES POC: NEGATIVE
URINE NITRITES POC: NEGATIVE

## 2018-05-04 NOTE — PROGRESS NOTES
Return OB Visit       Subjective:   Kyara Baldwin 33 y.o. G5   STEPHANIE: 6/9/2018, by Ultrasound  GA:  34w6d. Fetal movement - yes  Vaginal bleeding - no  LOF - no  Contractions- some contractions. Very mild. Diet - eating healthy  Exercise - yes  PNV/Other supplements - yes    Allergies- reviewed:   No Known Allergies  Medications- reviewed:   Current Outpatient Prescriptions   Medication Sig    prenatal vit-iron fumarate-fa 27 mg iron- 0.8 mg tab tablet Take 1 Tab by mouth daily. No current facility-administered medications for this visit. Past Medical History- reviewed:  No past medical history on file. Past Surgical History- reviewed:   No past surgical history on file. Social History- reviewed:  Social History     Social History    Marital status: UNKNOWN     Spouse name: N/A    Number of children: N/A    Years of education: N/A     Occupational History    Not on file.      Social History Main Topics    Smoking status: Never Smoker    Smokeless tobacco: Never Used    Alcohol use No    Drug use: No    Sexual activity: Yes     Partners: Male     Other Topics Concern    Not on file     Social History Narrative     Immunizations- reviewed:   Immunization History   Administered Date(s) Administered    Influenza Vaccine (Quad) PF 12/12/2017    Tdap 04/20/2018         Objective:     Visit Vitals    /77    Pulse 95    Temp 98.2 °F (36.8 °C) (Oral)    Resp 16    Ht 5' 2\" (1.575 m)    Wt 213 lb (96.6 kg)    LMP 08/20/2017 (Exact Date)    SpO2 99%    BMI 38.96 kg/m2       Physical Exam:  GENERAL APPEARANCE: alert, well appearing, in no apparent distress  LUNGS: clear to auscultation, no wheezes, rales or rhonchi, symmetric air entry  HEART: regular rate and rhythm, no murmurs  ABDOMEN: soft, nontender, nondistended, no abnormal masses, no epigastric pain, bowel sounds present, fundal height 37 cm, FHT present at 145 bpm  BACK: no CVA tenderness  UTERUS: gravid  EXTREMITIES: no redness or tenderness in the calves or thighs, no edema  NEUROLOGICAL: alert, oriented, normal speech, no focal findings or movement disorder noted      Labs  Recent Results (from the past 12 hour(s))   AMB POC URINALYSIS DIP STICK AUTO W/O MICRO    Collection Time: 18  1:50 PM   Result Value Ref Range    Color (UA POC) Yellow     Clarity (UA POC) Clear     Glucose (UA POC) Negative Negative    Bilirubin (UA POC) Negative Negative    Ketones (UA POC) Negative Negative    Specific gravity (UA POC) 1.010 1.001 - 1.035    Blood (UA POC) Negative Negative    pH (UA POC) 6.0 4.6 - 8.0    Protein (UA POC) Negative Negative    Urobilinogen (UA POC) 0.2 mg/dL 0.2 - 1    Nitrites (UA POC) Negative Negative    Leukocyte esterase (UA POC) Negative Negative         Assessment   Lauri Huerta 33 y.o. G5   STEPHANIE: 2018, by Ultrasound  GA:  34w6d. ICD-10-CM ICD-9-CM    1. Pregnant and not yet delivered, third trimester Z34.93 V22.1 AMB POC URINALYSIS DIP STICK AUTO W/O MICRO   2. History of  delivery Z87.51 V13.21    3. Obesity (BMI 35.0-39.9 without comorbidity) E66.9 278.00    4. Hx of triplet pregnancy in prior pregnancy Z87.59 V13.29          Plan     Orders Placed This Encounter    AMB POC URINALYSIS DIP STICK AUTO W/O MICRO       IUP:  - Prenatal Labs: A+, AB screen neg, HBsAg neg, G/C neg, HIV NR, T pal neg, Rubella/VZV immune  - GBS will check in next visit  - Tdap given on   - Ultrasound:  showed single viable IUP, fetal growth is appropriate; EFW is at the 65th percentile    Pregnancy complicated by:  -History of PTL (at ?34 weeks) with 2 subsequent SVDs and one pregnancy with triplets -> placenta problem -> d&c: Continue weekly adolph injections until 36 weeks gestation per M. Last injection was given 18. Next week will be last week of Adolph. - Obesity- 1 Hr GTT done in December due to obesity: 138.  Repeat 1hr GTT in previous visit: Normal. Follow up: RTC in 1 week - appt made with Dr. Tomer Miner precautions discussed, including: Regular painful contractions, lasting for greater than one hour, taking your breath away; any vaginal bleeding; any leakage of fluid; or absent or decreased fetal movement. Call M.D. on call if any of these symptoms or signs occur. I have discussed the diagnosis with the patient and the intended plan as seen in the above orders. The patient has received an after-visit summary and questions were answered concerning future plans. I have discussed medication side effects and warnings with the patient as well. Informed pt to return to the office or go to the ER if she experiences vaginal bleeding, vaginal discharge, leaking of fluid, pelvic cramping.     Pt seen and discussed with Dr. Timmy Espinoza (attending physician)      Fantasma Santana MD  Family Medicine Resident, PGY-1

## 2018-05-04 NOTE — PROGRESS NOTES
I reviewed with the resident the medical history and the resident's findings on the physical examination. I discussed with the resident the patient's diagnosis and concur with the plan. Meghan Pulliam is a 33 y.o. female G5  at 34w6d. presents for routine PNC. 2018, by Ultrasound  Concerns addressed: Routine PNC  Pregnancy complicated by: Hx of  delivery. On mekena, will stop at 36wks  Denies VB, LOF, Contractions. + Fetal movement. Weight gain reviewed. RTC in 1 week.     Visit Vitals    /77    Pulse 95    Temp 98.2 °F (36.8 °C) (Oral)    Resp 16    Ht 5' 2\" (1.575 m)    Wt 213 lb (96.6 kg)    LMP 2017 (Exact Date)    SpO2 99%    BMI 38.96 kg/m2     FHT: 145  FH: 37cm    Recent Results (from the past 24 hour(s))   AMB POC URINALYSIS DIP STICK AUTO W/O MICRO    Collection Time: 18  1:50 PM   Result Value Ref Range    Color (UA POC) Yellow     Clarity (UA POC) Clear     Glucose (UA POC) Negative Negative    Bilirubin (UA POC) Negative Negative    Ketones (UA POC) Negative Negative    Specific gravity (UA POC) 1.010 1.001 - 1.035    Blood (UA POC) Negative Negative    pH (UA POC) 6.0 4.6 - 8.0    Protein (UA POC) Negative Negative    Urobilinogen (UA POC) 0.2 mg/dL 0.2 - 1    Nitrites (UA POC) Negative Negative    Leukocyte esterase (UA POC) Negative Negative

## 2018-05-04 NOTE — PATIENT INSTRUCTIONS
Semanas 34 a 36 de saenz embarazo: Instrucciones de cuidado - [ Georgette Ricks 34 to 39 of Your Pregnancy: Care Instructions ]  Instrucciones de cuidado    A estas [place], saenz bebé y saenz abdomen habrán crecido considerablemente. Maribell es Kathy de pineda a christiano. En un embarazo a término se puede pineda a [employer] 40 y 43. Los pulmones de saenz bebé están maribell listos para respirar aire. Los huesos de la rita de saenz bebé ahora son bastante firmes rajat para protegerla corrine se mantienen lo suficientemente blandos rajat para moverse al atravesar el canal de [place]. Es posible que sienta entusiasmo, jagjit, ansiedad o miedo. Quizá se pregunte cómo se dará cuenta de si está en trabajo de parto o qué esperar en suresh momento. Trate de ser flexible con herber expectativas respecto del nacimiento. Dado que cada nacimiento es diferente, no hay manera de saber exactamente cómo será saenz parto. Esta hoja de cuidados la ayudará a saber qué esperar y cómo prepararse. Le podría facilitar el parto. Si todavía no le musa aplicado la vacuna Tdap (tétanos, difteria y tos [place]) ayaz stefan Niki [place], hable con saenz médico acerca de aplicársela. [place] a proteger a saenz recién nacido contra la infección por tos ferina. En la semana 36, a la mayoría de las mujeres se les hace lennox prueba de estreptococos del dyana B (GBS, por herber siglas en inglés). Los estreptococos del dyana B son bacterias comunes que pueden vivir en la vagina y el recto. Pueden hacer que saenz bebé se enferme después del parto. Si el resultado es positivo, usted recibirá antibióticos ayaz el trabajo de [place]. Los medicamentos evitarán que saenz bebé contraiga las bacterias. La atención de seguimiento es lennox parte clave de saenz tratamiento y seguridad. Asegúrese de hacer y acudir a todas las citas, y llame a saenz médico si está teniendo problemas. También es lennox buena idea saber los resultados de los exámenes y mantener lennox lista de los medicamentos que roxana.   ¿Cómo puede cuidarse en el hogar? Aprenda sobre las alternativas para aliviar el dolor  · El dolor se manifiesta de modo diferente en cada chanda. Hable con saenz médico acerca de herber sentimientos sobre el dolor. · Puede elegir entre varias formas de aliviar el dolor. Estas incluyen medicamentos o técnicas de respiración, así rajat medidas para estar cómoda. Usted puede utilizar más de [place] opción. · Si elige un analgésico (medicamento para el dolor) ayaz el trabajo de [place], hable con saenz médico acerca de herber opciones. Algunos medicamentos reducen la ansiedad y [place] a aliviar parte del dolor. Otros adormecen la parte inferior del cuerpo para que no sienta dolor. · Asegúrese de decirle a saenz médico acerca de saenz elección de analgésico antes de empezar el trabajo de parto o muy temprano en el [place]. Es posible que pueda cambiar de parecer a medida que avanza el [place]. · Liana vez se duerme a lennox chanda con medicamentos administrados a través de lennox máscara o por vía intravenosa (IV). Trabajo de parto y [place]  · La primera etapa del [place] de parto se divide en mamie fases: Desi Broccoli y de transición. ¨ La mayoría de las mujeres experimentan la fase latente del [place] de parto en herber hogares. Usted puede [employer] o descansar, comer refrigerios livianos, beber líquidos natanael y comenzar a contar las contracciones. ¨ Cuando advierta que se le vuelve difícil hablar ayaz lennox contracción, es posible que esté por pasar a la fase activa. Ayaz la fase [place], debería ir al hospital si no está allí aún. ¨ Usted está en la fase activa cuando tiene contracciones cada 3 o 4 minutos y duggan alrededor de 60 segundos. El swapnil uterino comienza a abrirse con Janyce Grout. ¨ Si se le rompe la jv, las contracciones serán más intensas y más frecuentes. ¨ Ayaz la fase de transición, el swapnil uterino se estira y las contracciones se producen con Janyce Grout.   ¨ Quizá tenga deseos de pujar, sin embargo es posible que el swapnil uterino aún no esté preparado. El médico le dirá cuándo pujar. · La segunda etapa comienza cuando el swapnil uterino se abre por completo y usted está lista para pujar. ¨ Las contracciones son muy intensas a fin de empujar al bebé por el canal de parto. ¨ Sentirá la necesidad de pujar. Podría sentir rajat si tuviera ganas de evacuar el intestino. ¨ Quizás la entrenen a [place] 41 contracciones. Estas contracciones serán muy intensas corrine no ocurrirán con tanta frecuencia. Puede descansar un poco entre contracciones. ¨ Es posible que esté sensible e irritable. Es posible que no se dé cuenta de lo que pasa a saenz alrededor. ¨ Un último esfuerzo y habrá nacido saenz bebé. · La tercera etapa ocurre cuando con unas cuantas contracciones más se expulsa la placenta. [place] puede durar 30 minutos o menos. · La cuarta etapa es la de recuperación. Es posible que se sienta abrumada con las emociones y exhausta corrine alerta. Starla es un buen momento para comenzar el amamantamiento. ¿Dónde puede encontrar más información en inglés? Lilli Silverio a [email or web address]-james.info/. Marylin Tapia Z194 en la búsqueda para aprender más acerca de \"Semanas 34 a 39 de saenz embarazo: Instrucciones de cuidado - [ Nobie  34 to 39 of Your Pregnancy: Care Instructions ]. \"  Revisado: 16 marzo, 2017  Versión del contenido: 11.4  © 0452-4858 Healthwise, Incorporated. Las instrucciones de cuidado fueron adaptadas bajo licencia por [employer] (which disclaims liability or warranty for this information). Si usted tiene [place] afección médica o sobre estas instrucciones, siempre pregunte a saenz profesional de bryanna. Healthwise, Incorporated niega toda garantía o responsabilidad por saenz uso de esta información. Aprenda sobre el [place] y la obesidad - [ Learning About Pregnancy and Obesity ]  ¿Qué efecto tiene saenz peso en saenz [place]?     La mayoría de las mujeres [place], independientemente de burch tamaño, tienen bebés saludables. Y los conceptos básicos de la atención médica son los mismos para todas las mujeres. Usted tendrá la misma cantidad de visitas médicas y los mismos tipos de exámenes que cualquier otra chanda [place]. Recibirá lo que necesita para tener un bebé saludable. Ailyn burch tamaño puede marcar lennox diferencia en algunas cosas. Usted y burch médico tendrán que vigilar burch peso ayaz el Rosi Platts. Tendrá que prestar mucha atención a cosas rajat la presión arterial y la posibilidad de tener diabetes gestacional. (La diabetes gestacional es un tipo de diabetes que algunas mujeres tienen ayaz el Rosi Platts). Se le dará la misma atención especial a burch bebé en desarrollo. Burch peso también puede afectar al Marlyn Goldmann de parto y al parto. Colabore con burch médico para recibir el cuidado que necesita. Jerri Darling a todas las visitas médicas y siga los consejos de burch médico acerca de lo que debe hacer y lo que debe evitar ayaz el embarazo. ¿Qué debería saber acerca de adelgazar y engordar? · El embarazo no es el momento de adelgazar. Burch bebé necesita que usted tenga lennox alimentación equilibrada. No elimine grupos de alimentos ni chan ningún tipo de dieta para adelgazar. · Los expertos recomiendan lennox subida de peso de entre 11 y 21 libras (5 y 9 kilos). Burch médico colaborará con usted para fijar lennox meta de peso que sea [place] para usted. Es posible que el [employer] recomiende no aumentar de [place]. · Aunque con frecuencia las mujeres embarazadas bromean acerca de \"comer para dos\", usted no necesita comer el doble de comida. Por lo general, las mujeres embarazadas necesitan comer unas 300 calorías adicionales al día. Usted puede hacerlo por medio de un sándwich o con Wendelin Renita y [place]illy taza de yogur. ¿Qué puede hacer para tener un embarazo saludable?   Las mejores cosas que puede hacer por usted y por burch bebé son alimentarse de manera saludable, hacer ejercicio con regularidad, evitar el tabaco y el alcohol y acudir a las visitas médicas. · Coma lennox variedad de alimentos de cada dorothy de los grupos de alimentos. Asegúrese de consumir suficiente calcio y ácido fólico.  · Dashawn vez desee colaborar con un dietista que la ayude a planificar comidas saludables para que ingiera la cantidad correcta de calorías para usted. · Si no hacía mucho ejercicio antes de quedar embarazada, hable con saenz médico acerca de cómo puede incrementar la actividad lentamente. Es posible que saenz médico desee establecer un programa de ejercicio junto con usted. ¿Dónde puede encontrar más información en inglés? Minor Cordia a [email or web address]-james.info/. Escriba B644 en la búsqueda para aprender más acerca de \"Aprenda sobre el [facility] y la obesidad - [ Learning About Pregnancy and Obesity ]. \"  Revisado: 16 marzo, 2017  Versión del contenido: 11.4  © 5964-4186 Healthwise, Incorporated. Las instrucciones de cuidado fueron adaptadas bajo licencia por [employer] (which disclaims liability or warranty for this information). Si usted tiene [place] afección médica o sobre estas instrucciones, siempre pregunte a saenz profesional de bryanna. Healthwise, Incorporated niega toda garantía o responsabilidad por saenz uso de esta información. Milta Perfecto a saenz médico ayaz el embarazo (después de 20 semanas) - [ Learning About When to Call Your Doctor During Pregnancy (After 20 Weeks) ]  Instrucciones de cuidado  Es normal que tenga inquietudes acerca de lo que podría ser un problema ayaz el [facility]. Aunque la mayoría de las mujeres embarazadas no tienen ningún problema grave, es importante saber cuándo llamar a saenz médico si tiene determinados síntomas o señales de trabajo de [place]. Estas son algunas sugerencias generales. Saenz médico puede darle más información sobre cuándo llamar.   Cuándo llamar a saenz médico (después de 20 semanas)  Llame al 911 en cualquier momento que sospeche que puede necesitar atención de urgencia. Por ejemplo, llame si:  · Tiene sangrado vaginal intenso. · Tiene dolor repentino e intenso en el abdomen. · Se desmayó (perdió el conocimiento). · Tiene lennox convulsión. · Ve o siente el cordón umbilical.  · Jennifer que está a punto de pineda a christiano a saenz bebé y no puede llegar en forma odell al hospital.  [place] Negar a saenz médico ahora mismo o busque atención médica inmediata si:  · Tiene sangrado vaginal.  · Tiene dolor en el abdomen. · Tiene fiebre. · Tiene síntomas de preeclampsia, tales rajat:  ¨ Hinchazón repentina de la davy, las gray o los pies. ¨ Problemas nuevos con la visión (rajat oscurecimiento de la visión o visión borrosa). ¨ Dolor de rita intenso. · Tiene lennox pérdida repentina de líquido por la vagina. (Piensa que rompió la jv). · Jennifer que puede estar en [place] significa que usted ha tenido al menos 4 contracciones en 20 minutos o al menos 8 contracciones en [place]. · Nota que saenz bebé ha dejado de moverse o lo hace mucho menos de lo habitual.  · Tiene síntomas de lennox infección del tracto urinario. Estos pueden incluir:  ¨ Dolor o ardor al orinar. ¨ Necesidad de orinar con frecuencia sin poder eliminar mucha orina. ¨ Dolor en el flanco, que se encuentra hamilton debajo de la caja torácica y [place] de la cintura en un lado de la espalda. ¨ Jonathan en la orina. Preste especial atención a los cambios en saenz bryanna y asegúrese de comunicarse con saenz médico si:  · Tiene flujo vaginal con un olor desagradable. · Tiene cambios en la piel, tales rajat:  ¨ Salpullido. ¨ Comezón. ¨ Color amarillento en la piel. · Tiene otras inquietudes acerca de saenz embarazo. Si tiene signos de trabajo de parto al llegar a las 37 [address] [email or web address] [email or web address] o más  Si tiene señales de Chloé Genin de parto a las 37 semanas o [address] [facility], es posible que saenz médico le diga que llame cuando saenz trabajo de parto se vuelva más [place]. Los síntomas del trabajo de parto activo incluyen:  · Contracciones que son regulares.   · Contracciones a intervalos de menos de 5 minutos. · Contracciones ayaz las cuales es difícil hablar. La atención de seguimiento es lennox parte clave de saenz tratamiento y seguridad. Asegúrese de hacer y acudir a todas las citas, y llame a saenz médico si está teniendo problemas. También es lennox buena idea saber los resultados de los exámenes y mantener lennox lista de los medicamentos que roxana. ¿Dónde puede encontrar más información en inglés? [place] a [email or web address]-james.info/. Escriba L027 en la búsqueda para aprender más acerca de \"Aprenda cuándo llamar a saenz médico ayaz el embarazo (después de 20 semanas) - [ Learning About When to Call Your Doctor During Pregnancy (After 20 Weeks) ]. \"  Revisado: 16 marzo, 2017  Versión del contenido: 11.4  © 7357-1758 Healthwise, Incorporated. Las instrucciones de cuidado fueron adaptadas bajo licencia por [employer] (which disclaims liability or warranty for this information). Si usted tiene [place] afección médica o sobre estas instrucciones, siempre pregunte a saenz profesional de bryanna. Healthwise, Incorporated niega toda garantía o responsabilidad por saenz uso de esta información.

## 2018-05-04 NOTE — MR AVS SNAPSHOT
[address] 
[phone] Patient: Felix Garcias MRN: [id number] Visit Information Nery Louis y Maite Personal Médico Departamento Teléfono del Dep. Número de visita 2018  1:45 PM Arlette Lu, [facility] [phone] [id number] Follow-up Instructions Return in about 1 week (around 2018). 2018  2:00 PM  
OB VISIT with Arlette Lu MD  
[facility]  
 [address] [facility] [address]  
[phone]  
  
   
 [address] [place] [id number] Upcoming Health Maintenance Date Due  
 HPV Age 9Y-34Y (1 of 3 - Female 3 Dose Series) 2002 PAP AKA CERVICAL CYTOLOGY 2012 Influenza Age 5 to Adult 2018 DTaP/Tdap/Td series (2 - Td) 2028 [address]  Review Complete El: 2018 Por: Chery Woo LPN A partir del:  2018 No Known Allergies Vacunas actuales Revisadas el:  2018 Caresse Distance Influenza Vaccine (Quad) PF 2017 Tdap 2018 No revisadas esta visita You Were Diagnosed With   
  
 Lenice Swenson Pregnant and not yet delivered, third trimester    -  Primary ICD-10-CM: Z34.93 
ICD-9-CM: V22.1 History of  delivery     ICD-10-CM: Z87.51 
ICD-9-CM: V13.21 Obesity (BMI 35.0-39.9 without comorbidity)     ICD-10-CM: V12.1 ICD-9-CM: 278.00 Hx of triplet pregnancy in prior pregnancy     ICD-10-CM: Z87.59 
ICD-9-CM: V13.29 Partes vitales PS Pulso Temperatura Resp [place] ( percentil de crecimiento) Peso (percentil de crecimiento) 123/77 95 98.2 °F (36.8 °C) (Oral) 16 5' 2\" (1.575 m) 213 lb (96.6 kg) LMP (última lizzie) SpO2 BMI ([facility]) Estado obstétrico Estatus de tabaquísmo 2017 (Exact Date) 99% 38.96 kg/m2 Pregnant Never Smoker BMI and BSA Data Body Mass Index Body Surface Area  
 38.96 kg/m 2 2.06 m 2 Preferred Pharmacy Pharmacy Name Phone 500 [address], 130[address] [facility] [phone] Saenz lista de medicamentos actualizada Lista actualizada 5/4/18  2:10 PM.  Ellan Crumble use saenz lista de medicamentos más reciente. prenatal vit-iron fumarate-fa 27 mg iron- 0.8 mg Tab tablet Take 1 Tab by mouth daily. Hicimos lo siguiente AMB POC URINALYSIS DIP STICK AUTO W/O MICRO [92436 CPT(R)] Instrucciones de seguimiento Return in about 1 week (around 5/11/2018). Instrucciones para el Paciente Semanas 34 a 36 de saenz embarazo: Instrucciones de cuidado - [ Rebbecca Samantha 34 to 39 of Your Pregnancy: Care Instructions ] Instrucciones de cuidado A estas [place], saenz bebé y saenz abdomen habrán crecido considerablemente. Melia es Kathy de pineda a christiano. En un embarazo a término se puede pineda a [employer] 40 y 43. Los pulmones de saenz bebé están melia listos para respirar aire. Los huesos de la rita de saenz bebé ahora son bastante firmes rajat para protegerla corrine se mantienen lo suficientemente blandos rajat para moverse al atravesar el canal de [place]. Es posible que sienta entusiasmo, jagjit, ansiedad o miedo. Quizá se pregunte cómo se dará cuenta de si está en trabajo de parto o qué esperar en suresh momento. Trate de ser flexible con herber expectativas respecto del nacimiento. Dado que cada nacimiento es diferente, no hay manera de saber exactamente cómo será saenz parto. Esta hoja de cuidados la ayudará a saber qué esperar y cómo prepararse. Le podría facilitar el parto. Si todavía no le musa aplicado la vacuna Tdap (tétanos, difteria y tos [place]) ayaz stefan [facility], hable con saenz médico acerca de aplicársela. Javan Degroot a proteger a saenz recién nacido contra la infección por tos ferina.  
En la semana 36, a la mayoría de las mujeres se les hace lennox prueba de estreptococos del dyana B (GBS, por herber siglas en inglés). Los estreptococos del dyana B son bacterias comunes que pueden vivir en la vagina y el recto. Pueden hacer que saenz bebé se enferme después del parto. Si el resultado es positivo, usted recibirá antibióticos ayaz el trabajo de [place]. Los medicamentos evitarán que saenz bebé contraiga las bacterias. La atención de seguimiento es lennox parte clave de saenz tratamiento y seguridad. Asegúrese de hacer y acudir a todas las citas, y llame a saenz médico si está teniendo problemas. También es lennox buena idea saber los resultados de los exámenes y mantener lennox lista de los medicamentos que roxana. Cómo puede cuidarse en el hogar? [place] alternativas para aliviar el dolor · El dolor se manifiesta de modo diferente en cada chanda. Hable con saenz médico acerca de herber sentimientos sobre el dolor. · Puede elegir entre varias formas de aliviar el dolor. Estas incluyen medicamentos o técnicas de respiración, así rajat medidas para estar cómoda. Usted puede utilizar más de [place] opción. · Si elige un analgésico (medicamento para el dolor) ayaz el trabajo de [place], hable con saenz médico acerca de herber opciones. Algunos medicamentos reducen la ansiedad y Citizen of [place] [facility] Territories a aliviar parte del dolor. Otros adormecen la parte inferior del cuerpo para que no sienta dolor. · Asegúrese de decirle a saenz médico acerca de saenz elección de analgésico antes de empezar el trabajo de parto o muy temprano en el [place]. Es posible que pueda cambiar de parecer a medida que avanza el [place]. · Liana vez se duerme a lennox chanda con medicamentos administrados a través de lennox máscara o por vía intravenosa (IV). Yoan Angulo · La primera etapa del [place] de parto se divide en mamie fases: latente, activa y de transición. ¨ La mayoría de las mujeres experimentan la fase latente del [place] de parto en herber hogares.  Usted puede [employer] o descansar, comer refrigerios livianos, beber líquidos natanael y comenzar a contar las contracciones. ¨ Cuando advierta que se le vuelve difícil hablar ayaz lennox contracción, es posible que esté por pasar a la fase activa. Ayaz la fase Kathyrn Chris, debería ir al hospital si no está allí aún. ¨ Usted está en la fase activa cuando tiene contracciones cada 3 o 4 minutos y duggan alrededor de 60 segundos. El swapnil uterino comienza a abrirse con Ethan Aquilino. ¨ Si se le rompe la jv, las contracciones serán más intensas y más frecuentes. ¨ Ayaz la fase de transición, el swapnil uterino se estira y las contracciones se producen con Ethan Aquilino. ¨ Quizá tenga deseos de pujar, sin embargo es posible que el swapnil uterino aún no esté preparado. El médico le dirá cuándo pujar. · La segunda etapa comienza cuando el swapnil uterino se abre por completo y usted está lista para pujar. ¨ Las contracciones son muy intensas a fin de empujar al bebé por el canal de parto. ¨ Sentirá la necesidad de pujar. Podría sentir rajat si tuviera ganas de evacuar el intestino. ¨ Quizás la entrenen a [place] [address] contracciones. Estas contracciones serán muy intensas corrine no ocurrirán con tanta frecuencia. Puede descansar un poco entre contracciones. ¨ Es posible que esté sensible e irritable. Es posible que no se dé cuenta de lo que pasa a saenz alrededor. ¨ Un último esfuerzo y habrá nacido saenz bebé. · La tercera etapa ocurre cuando con unas cuantas contracciones más se expulsa la placenta. [place] puede durar 30 minutos o menos. · La cuarta etapa es la de recuperación. Es posible que se sienta abrumada con las emociones y exhausta corrine alerta. Starla es un buen momento para comenzar el amamantamiento. Dónde puede encontrar más información en inglés? Thomas Negrete a http://[email or web address]-james.info/. Christine Burdick B065 en la búsqueda para aprender más acerca de \"Semanas 34 a 39 de saenz embarazo:  Instrucciones de cuidado - [ Ariel Redder 34 to 39 of Your Pregnancy: Care Instructions ]. \" 
Revisado: 16 marzo, 2017 Versión del contenido: 11.4 © 1795-6365 Healthwise, Incorporated. Las instrucciones de cuidado fueron adaptadas bajo licencia por [employer] (which disclaims liability or warranty for this information). Si usted tiene [place] afección médica o sobre estas instrucciones, siempre pregunte a saenz profesional de bryanna. Healthwise, Incorporated niega toda garantía o responsabilidad por saenz uso de esta información. Aprenda sobre el [facility] y la obesidad - [ Learning About Pregnancy and Obesity ] Suha Junior tiene saenz peso en saenz [facility]? La mayoría de las mujeres [place], independientemente de saenz tamaño, tienen bebés saludables. Y los conceptos básicos de la atención médica son los mismos para todas las mujeres. Usted tendrá la misma cantidad de visitas médicas y los mismos tipos de exámenes que cualquier otra chanda [place]. Recibirá lo que necesita para tener un bebé saludable. Ailyn saenz tamaño puede marcar lennox diferencia en algunas cosas. Usted y saenz médico tendrán que vigilar saenz peso ayaz el [facility]. Tendrá que prestar mucha atención a cosas rajat la presión arterial y la posibilidad de tener diabetes gestacional. (La diabetes gestacional es un tipo de diabetes que algunas mujeres tienen ayaz el [facility]). Se le dará la misma atención especial a saenz bebé en desarrollo. Saenz peso también puede afectar al Dl Seat de parto y al parto. Colabore con saenz médico para recibir el cuidado que necesita. Lashawn Asters a todas las visitas médicas y siga los consejos de saenz médico acerca de lo que debe hacer y lo que debe evitar ayaz el embarazo. Munoz Crooked saber acerca de adelgazar y engordar? · El embarazo no es el momento de adelgazar. Saenz bebé necesita que usted tenga lennox alimentación equilibrada. No elimine grupos de alimentos ni chan ningún tipo de dieta para adelgazar. · Los expertos recomiendan lennox subida de peso de entre 11 y 21 libras (5 y 9 kilos). Saenz médico colaborará con usted para fijar lennox meta de peso que sea [place] para usted. Es posible que el [employer] recomiende no aumentar de [place]. · Aunque con frecuencia las mujeres embarazadas bromean acerca de \"comer para dos\", usted no necesita comer el doble de comida. Por lo general, las mujeres embarazadas necesitan comer unas 300 calorías adicionales al día. Usted puede hacerlo por medio de un sándwich o con Wendelyn Carton y Kell Pour taza de yogur. Qué puede hacer para tener un embarazo saludable? Las mejores cosas que puede hacer por usted y por saenz bebé son alimentarse de manera saludable, hacer ejercicio con regularidad, evitar el tabaco y el alcohol y acudir a las visitas médicas. · Coma lennox variedad de alimentos de cada dorothy de los grupos de alimentos. Asegúrese de consumir suficiente calcio y ácido fólico. 
· Dashawn vez desee colaborar con un dietista que la ayude a planificar comidas saludables para que ingiera la cantidad correcta de calorías para usted. · Si no hacía mucho ejercicio antes de quedar embarazada, hable con saenz médico acerca de cómo puede incrementar la actividad lentamente. Es posible que saenz médico desee establecer un programa de ejercicio junto con usted. Dónde puede encontrar más información en inglés? [employer] a http://abelardo-james.info/. Escriba B644 en la búsqueda para aprender más acerca de \"Aprenda sobre el [place] y la obesidad - [ Learning About Pregnancy and Obesity ]. \" 
Revisado: 16 marzo, 2017 Versión del contenido: 11.4 © 6885-5974 Healthwise, Incorporated. Las instrucciones de cuidado fueron adaptadas bajo licencia por [employer] Help Connections (which disclaims liability or warranty for this information). Si usted tiene [place] afección médica o sobre estas instrucciones, siempre pregunte a saenz profesional de bryanna.  [employer] niega toda garantía o responsabilidad por saenz uso de esta información. Aprenda cuándo llamar a saenz médico ayaz el embarazo (después de 20 semanas) - [ Learning About When to Call Your Doctor During Pregnancy (After 20 Weeks) ] Instrucciones de cuidado Es normal que tenga inquietudes acerca de lo que podría ser un problema ayaz el [facility]. Aunque la mayoría de las mujeres embarazadas no tienen ningún problema grave, es importante saber cuándo llamar a saenz médico si tiene determinados síntomas o señales de trabajo de [place]. Estas son algunas sugerencias generales. Saenz médico puede darle más información sobre cuándo llamar. Cuándo llamar a saenz médico (después de 20 semanas) Llame al 911 en cualquier momento que sospeche que puede necesitar atención de [place]. Por ejemplo, llame si: · Tiene sangrado vaginal intenso. · Tiene dolor repentino e intenso en el abdomen. · Se desmayó (perdió el conocimiento). · Tiene lennox convulsión. · Ve o siente el cordón umbilical. 
· Jennifer que está a punto de pineda a christiano a saenz bebé y no puede llegar en forma odell al hospital. 
Jose Rubia a saenz médico ahora mismo o busque atención médica inmediata si: · Tiene sangrado vaginal. 
· Tiene dolor en el abdomen. · Tiene fiebre. · Tiene síntomas de preeclampsia, tales rajat: 
¨ Hinchazón repentina de la davy, las gray o los pies. ¨ Problemas nuevos con la visión (rajat oscurecimiento de la visión o visión borrosa). ¨ Dolor de rita intenso. · Tiene lennox pérdida repentina de líquido por la vagina. (Piensa que rompió la jv). · Jennifer que puede estar en [place] significa que usted ha tenido al menos 4 contracciones en 20 minutos o al menos 8 contracciones en [employer]. · Nota que saenz bebé ha dejado de moverse o lo hace mucho menos de lo habitual. 
· Tiene síntomas de lennox infección del tracto urinario. Estos pueden incluir: ¨ Dolor o ardor al orinar. ¨ Necesidad de orinar con frecuencia sin poder eliminar mucha orina. ¨ Dolor en el flanco, que se encuentra hamilton debajo de la caja torácica y [place] de la cintura en un lado de la espalda. ¨ Jonathan en la orina. Preste especial atención a los cambios en saenz bryanna y asegúrese de comunicarse con saenz médico si: · Tiene flujo vaginal con un olor desagradable. · Tiene cambios en la piel, tales rajat: 
¨ Salpullido. ¨ Comezón. ¨ Color amarillento en la piel. · Tiene otras inquietudes acerca de saenz embarazo. Si tiene signos de trabajo de parto al llegar a las [address] Si tiene señales de [place] a las 37 [address] [facility] o [address] [facility], es posible que saenz médico le diga que llame cuando saenz trabajo de parto se vuelva [place] [facility]. Los síntomas del trabajo de parto activo incluyen: · Contracciones que son regulares. · Contracciones a intervalos de menos de 5 minutos. · Contracciones ayaz las cuales es difícil hablar. La atención de seguimiento es lennox parte clave de saenz tratamiento y seguridad. Asegúrese de hacer y acudir a todas las citas, y llame a saenz médico si está teniendo problemas. También es lennox buena idea saber los resultados de los exámenes y mantener lennox lista de los medicamentos que roxana. Dónde puede encontrar más información en inglés? Jeff Noland a http://abelardo-james.info/. Escriba B170 en la búsqueda para aprender más acerca de \"Aprenda cuándo llamar a saenz médico ayaz el embarazo (después de 20 semanas) - [ Learning About When to Call Your Doctor During Pregnancy (After 20 Weeks) ]. \" 
Revisado: 16 marzo, 2017 Versión del contenido: 11.4 © 4622-8266 Healthwise, Incorporated. Las instrucciones de cuidado fueron adaptadas bajo licencia por [employer] Help Connections (which disclaims liability or warranty for this information). Si usted tiene [place] afección médica o sobre estas instrucciones, siempre pregunte a saenz profesional de bryanna. Healthwise, Incorporated niega toda garantía o responsabilidad por saenz uso de esta información. Introducing [facility]! Jasminamoise Mustafa introduces [facility] patient portal. Now you can access parts of your medical record, email your doctor's office, and request medication refills online. 1. In your internet browser, go to [email or web address] [employer] 2. Click on the First Time User? Click Here link in the Sign In box. You will see the New Member Sign Up page. 3. Enter your [facility] Access Code exactly as it appears below. You will not need to use this code after youve completed the sign-up process. If you do not sign up before the expiration date, you must request a new code. · [facility] Access Code: [email or web address] Expires: 5/28/2018  6:09 PM 
 
4. Enter the last four digits of your Social Security Number (xxxx) and Date of Birth (mm/dd/yyyy) as indicated and click Submit. You will be taken to the next sign-up page. 5. Create a [facility]ball ID. This will be your [facility]ball login ID and cannot be changed, so think of one that is secure and easy to remember. 6. Create a [facility]ball password. You can change your password at any time. 7. Enter your Password Reset Question and Answer. This can be used at a later time if you forget your password. 8. Enter your e-mail address. You will receive e-mail notification when new information is available in [address] 9. Click Sign Up. You can now view and download portions of your medical record. 10. Click the Download Summary menu link to download a portable copy of your medical information. If you have questions, please visit the Frequently Asked Questions section of the Cannonball website. Remember, [facility]ball is NOT to be used for urgent needs. For medical emergencies, dial 911. Now available from your iPhone and Android! Please provide this summary of care documentation to your next provider. Your primary care clinician is listed as Tal Ybarra. If you have any questions after today's visit, please call [phone].

## 2018-05-11 ENCOUNTER — ROUTINE PRENATAL (OUTPATIENT)
Dept: FAMILY MEDICINE CLINIC | Age: 28
End: 2018-05-11

## 2018-05-11 VITALS
HEART RATE: 71 BPM | DIASTOLIC BLOOD PRESSURE: 74 MMHG | RESPIRATION RATE: 14 BRPM | TEMPERATURE: 98 F | BODY MASS INDEX: 39.56 KG/M2 | HEIGHT: 62 IN | OXYGEN SATURATION: 98 % | SYSTOLIC BLOOD PRESSURE: 113 MMHG | WEIGHT: 215 LBS

## 2018-05-11 DIAGNOSIS — Z34.93 PREGNANT AND NOT YET DELIVERED IN THIRD TRIMESTER: Primary | ICD-10-CM

## 2018-05-11 DIAGNOSIS — E66.9 OBESITY (BMI 35.0-39.9 WITHOUT COMORBIDITY): ICD-10-CM

## 2018-05-11 DIAGNOSIS — Z87.898: ICD-10-CM

## 2018-05-11 LAB
BILIRUB UR QL STRIP: NEGATIVE
GLUCOSE UR-MCNC: NEGATIVE MG/DL
GRBS, EXTERNAL: POSITIVE
KETONES P FAST UR STRIP-MCNC: NEGATIVE MG/DL
PH UR STRIP: 7 [PH] (ref 4.6–8)
PROT UR QL STRIP: NEGATIVE
SP GR UR STRIP: 1.01 (ref 1–1.03)
UA UROBILINOGEN AMB POC: NORMAL (ref 0.2–1)
URINALYSIS CLARITY POC: CLEAR
URINALYSIS COLOR POC: YELLOW
URINE BLOOD POC: NEGATIVE
URINE LEUKOCYTES POC: NORMAL
URINE NITRITES POC: NEGATIVE

## 2018-05-11 NOTE — PATIENT INSTRUCTIONS
Semanas 34 a 36 de saenz embarazo: Instrucciones de cuidado - [ Jaycee Waterman 34 to 39 of Your Pregnancy: Care Instructions ]  Instrucciones de cuidado    A estas [place], saenz bebé y saenz abdomen habrán crecido considerablemente. Melia es [place] de pineda a christiano. En un embarazo a término se puede pineda a [employer] 40 y 43. Los pulmones de saenz bebé están melia listos para respirar aire. Los huesos de la rita de saenz bebé ahora son bastante firmes rajat para protegerla corrine se mantienen lo suficientemente blandos rajat para moverse al atravesar el canal de [place]. Es posible que sienta entusiasmo, jagjit, ansiedad o miedo. Quizá se pregunte cómo se dará cuenta de si está en trabajo de parto o qué esperar en suresh momento. Trate de ser flexible con herber expectativas respecto del nacimiento. Dado que cada nacimiento es diferente, no hay manera de saber exactamente cómo será saenz parto. Esta hoja de cuidados la ayudará a saber qué esperar y cómo prepararse. Le podría facilitar el parto. Si todavía no le musa aplicado la vacuna Tdap (tétanos, difteria y tos [place]) ayaz [facility], hable con saenz médico acerca de aplicársela. Devora Flor a proteger a saenz recién nacido contra la infección por tos ferina. En la semana 36, a la mayoría de las mujeres se les hace lennox prueba de estreptococos del dynaa B (GBS, por herber siglas en inglés). Los estreptococos del dyana B son bacterias comunes que pueden vivir en la vagina y el recto. Pueden hacer que saenz bebé se enferme después del parto. Si el resultado es positivo, usted recibirá antibióticos ayaz el trabajo de [place]. Los medicamentos evitarán que saenz bebé contraiga las bacterias. La atención de seguimiento es lennox parte clave de saenz tratamiento y seguridad. Asegúrese de hacer y acudir a todas las citas, y llame a saenz médico si está teniendo problemas. También es lennox buena idea saber los resultados de los exámenes y mantener lennox lista de los medicamentos que roxana.   ¿Cómo puede cuidarse en el hogar? Aprenda sobre las alternativas para aliviar el dolor  · El dolor se manifiesta de modo diferente en cada chanda. Hable con saenz médico acerca de herber sentimientos sobre el dolor. · Puede elegir entre varias formas de aliviar el dolor. Estas incluyen medicamentos o técnicas de respiración, así rajat medidas para estar cómoda. Usted puede utilizar más de [place] opción. · Si elige un analgésico (medicamento para el dolor) ayaz el trabajo de [place], hable con saenz médico acerca de herber opciones. Algunos medicamentos reducen la ansiedad y [place] a aliviar parte del dolor. Otros adormecen la parte inferior del cuerpo para que no sienta dolor. · Asegúrese de decirle a saenz médico acerca de saenz elección de analgésico antes de empezar el trabajo de parto o muy temprano en el [place]. Es posible que pueda cambiar de parecer a medida que avanza el [place]. · Liana vez se duerme a lennox chanda con medicamentos administrados a través de lennox máscara o por vía intravenosa (IV). Trabajo de parto y [place]  · La primera etapa del [place] de parto se divide en mamie fases: Lethea Boros y de transición. ¨ La mayoría de las mujeres experimentan la fase latente del [place] de parto en herber hogares. Usted puede [employer] o descansar, comer refrigerios livianos, beber líquidos natanael y comenzar a contar las contracciones. ¨ Cuando advierta que se le vuelve difícil hablar ayaz lennox contracción, es posible que esté por pasar a la fase activa. Ayaz la fase Jennifer Michelle, debería ir al hospital si no está allí aún. ¨ Usted está en la fase activa cuando tiene contracciones cada 3 o 4 minutos y duggan alrededor de 60 segundos. El swapnil uterino comienza a abrirse con Vanita Majors. ¨ Si se le rompe la jv, las contracciones serán más intensas y más frecuentes. ¨ Ayaz la fase de transición, el swapnil uterino se estira y las contracciones se producen con Vanita Majors.   ¨ Quizá tenga deseos de pujar, sin embargo es posible que el swapnil uterino aún no esté preparado. El médico le dirá cuándo pujar. · La segunda etapa comienza cuando el swapnil uterino se abre por completo y usted está lista para pujar. ¨ Las contracciones son muy intensas a fin de empujar al bebé por el canal de parto. ¨ Sentirá la necesidad de pujar. Podría sentir rajat si tuviera ganas de evacuar el intestino. ¨ Quizás la entrenen a [place] 41 contracciones. Estas contracciones serán muy intensas corrine no ocurrirán con tanta frecuencia. Puede descansar un poco entre contracciones. ¨ Es posible que esté sensible e irritable. Es posible que no se dé cuenta de lo que pasa a saenz alrededor. ¨ Un último esfuerzo y habrá nacido saenz bebé. · La tercera etapa ocurre cuando con unas cuantas contracciones más se expulsa la placenta. [place] puede durar 30 minutos o menos. · La cuarta etapa es la de recuperación. Es posible que se sienta abrumada con las emociones y exhausta corrine alerta. Starla es un buen momento para comenzar el amamantamiento. ¿Dónde puede encontrar más información en inglés? Jeff Noland a [email or web address] Anu Sine [id number] en la búsqueda para aprender más acerca de \"Semanas 34 a 39 de saenz embarazo: Instrucciones de cuidado - [ Burton Lather 34 to 39 of Your Pregnancy: Care Instructions ]. \"  Revisado: 16 marzo, 2017  Versión del contenido: 11.4  © 3626-3973 Healthwise, Incorporated. Las instrucciones de cuidado fueron adaptadas bajo licencia por [employer] (which disclaims liability or warranty for this information). Si usted tiene Cameron Ethel afección médica o sobre estas instrucciones, siempre pregunte a saenz profesional de bryanna. [facility] niega toda garantía o responsabilidad por saenz uso de esta información.        Aprenda cuándo llamar a saenz médico ayaz el embarazo (después de 20 semanas) - [ Learning About When to Call Your Doctor During Pregnancy (After 20 Weeks) ]  Instrucciones de cuidado  Es normal que tenga inquietudes acerca de lo que podría ser un problema ayaz el embarazo. Aunque la mayoría de las mujeres embarazadas no tienen ningún problema grave, es importante saber cuándo llamar a saenz médico si tiene determinados síntomas o señales de trabajo de [place]. Estas son algunas sugerencias generales. Saenz médico puede darle más información sobre cuándo llamar. Cuándo llamar a saenz médico (después de 20 semanas)  Llame al 911 en cualquier momento que sospeche que puede necesitar atención de [place]. Por ejemplo, llame si:  · Tiene sangrado vaginal intenso. · Tiene dolor repentino e intenso en el abdomen. · Se desmayó (perdió el conocimiento). · Tiene lennox convulsión. · Ve o siente el cordón umbilical.  · Jennifer que está a punto de pineda a christiano a saenz bebé y no puede llegar en forma odell al hospital.  Bettejane Hoop a saenz médico ahora mismo o busque atención médica inmediata si:  · Tiene sangrado vaginal.  · Tiene dolor en el abdomen. · Tiene fiebre. · Tiene síntomas de preeclampsia, tales rajat:  ¨ Hinchazón repentina de la davy, las gray o los pies. ¨ Problemas nuevos con la visión (rajat oscurecimiento de la visión o visión borrosa). ¨ Dolor de rita intenso. · Tiene lennox pérdida repentina de líquido por la vagina. (Piensa que rompió la jv). · Jennifer que puede estar en [place] significa que usted ha tenido al menos 4 contracciones en 20 minutos o al menos 8 contracciones en [employer]. · Nota que saenz bebé ha dejado de moverse o lo hace mucho menos de lo habitual.  · Tiene síntomas de lennox infección del tracto urinario. Estos pueden incluir:  ¨ Dolor o ardor al orinar. ¨ Necesidad de orinar con frecuencia sin poder eliminar mucha orina. ¨ Dolor en el flanco, que se encuentra hamilton debajo de la caja torácica y [place] de la cintura en un lado de la espalda. ¨ Jonathan en la orina.   Preste especial atención a los cambios en saenz bryanna y asegúrese de comunicarse con saenz médico si:  · Tiene flujo vaginal con un olor desagradable. · Tiene cambios en la piel, tales rajat:  ¨ Salpullido. ¨ Comezón. ¨ Color amarillento en la piel. · Tiene otras inquietudes acerca de saenz embarazo. Si tiene signos de trabajo de parto al llegar a las [facility] o más  Si tiene señales de [place] de parto a las 37 semanas o [place], es posible que saenz médico le diga que llame cuando saenz trabajo de parto se vuelva más [place]. Los síntomas del trabajo de parto activo incluyen:  · Contracciones que son regulares. · Contracciones a intervalos de menos de 5 minutos. · Contracciones ayaz las cuales es difícil hablar. La atención de seguimiento es lennox parte clave de saenz tratamiento y seguridad. Asegúrese de hacer y acudir a todas las citas, y llame a saenz médico si está teniendo problemas. También es lennox buena idea saber los resultados de los exámenes y mantener lennox lista de los medicamentos que roxana. ¿Dónde puede encontrar más información en inglés? Kay Lipoma a [email or web address] Escriba [id number] en la búsqueda para aprender más acerca de \"Aprenda cuándo llamar a saenz médico ayaz el embarazo (después de 20 semanas) - [ Learning About When to Call Your Doctor During Pregnancy (After 20 Weeks) ]. \"  Revisado: 16 marzo, 2017  Versión del contenido: 11.4  © 1740-9765 Healthwise, Incorporated. Las instrucciones de cuidado fueron adaptadas bajo licencia por [employer] (which disclaims liability or warranty for this information). Si usted tiene [place] afección médica o sobre estas instrucciones, siempre pregunte a saenz profesional de bryanna. Healthwise, Incorporated niega toda garantía o responsabilidad por saenz uso de esta información.

## 2018-05-11 NOTE — MR AVS SNAPSHOT
[address] [facility] 70 [facility] 
[phone] Patient: Rosa Maria Miguel MRN: [id number] Visit Information Rita Pickett y Maite Personal Médico Departamento Teléfono del Dep. Número de visita 2018  2:00 PM Dawn Edward, 1000 [facility] [phone] Follow-up Instructions Return in about 1 week (around 2018). 2018 10:30 AM  
OB VISIT with Dawn Edward MD  
1000 [facility] ([address]) Appt Note: ob follow up  
 Sharon Madre Ashley Kelly Yen 906 [address]  
[phone]  
  
   
 Sharon Madre Ashley Kelly Yen 906 [phone] [id number] Upcoming Health Maintenance Date Due  
 HPV Age 9Y-34Y (1 of 3 - Female 3 Dose Series) 2002 PAP AKA CERVICAL CYTOLOGY 2012 Influenza Age 5 to Adult 2018 DTaP/Tdap/Td series (2 - Td) 2028 [address]  Review Complete El: 2018 Por: Dara Duarte LPN A partir del:  2018 No Known Allergies Vacunas actuales Revisadas el:  2018 Brynda Orlin Influenza Vaccine (Quad) PF 2017 Tdap 2018 No revisadas esta visita You Were Diagnosed With   
  
 Melani Mackey Pregnant and not yet delivered in third trimester    -  Primary ICD-10-CM: Z34.93 
ICD-9-CM: V22.1  labor in third trimester with  delivery, single or unspecified fetus     ICD-10-CM: O59.15X0 ICD-9-CM: 515.67 Hx of triplet pregnancy in prior pregnancy     ICD-10-CM: Z87.59 
ICD-9-CM: V13.29 Obesity (BMI 35.0-39.9 without comorbidity)     ICD-10-CM: X18.7 ICD-9-CM: 278.00 Partes vitales PS Pulso Temperatura Resp [place] ( percentil de crecimiento) Peso (percentil de crecimiento) 113/74 71 98 °F (36.7 °C) (Oral) 14 5' 2\" (1.575 m) 215 lb (97.5 kg) LMP (última lizzie) SpO2 BMI (IMC) Estado obstétrico Estatus de tabaquísmo 08/20/2017 (Exact Date) 98% 39.32 kg/m2 Pregnant Never Smoker Historial de signos vitales BMI and BSA Data Body Mass Index Body Surface Area  
 39.32 kg/m 2 2.07 m 2 Preferred Pharmacy Pharmacy Name Phone 500 [address], [address] [place], [place] [address] [phone] Burch lista de medicamentos actualizada Corey Clark actualizada 5/11/18  2:42 PM.  Tai Dietrich use burch lista de medicamentos más reciente. prenatal vit-iron fumarate-fa 27 mg iron- 0.8 mg Tab tablet Take 1 Tab by mouth daily. Hicimos lo siguiente AMB POC URINALYSIS DIP STICK AUTO W/O MICRO [01885 CPT(R)] CULTURE, GENITAL GROUP B STREP [47624 CPT(R)] Instrucciones de seguimiento Return in about 1 week (around 5/18/2018). Instrucciones para el Paciente Semanas 34 a 36 de burch embarazo: Instrucciones de cuidado - [ Jewell Fears 34 to 39 of Your Pregnancy: Care Instructions ] Instrucciones de cuidado A estas [place], burch bebé y burch abdomen habrán crecido considerablemente. Melia es [place] de pineda a christiano. En un embarazo a término se puede pineda a [employer] 40 y 43. Los pulmones de burch bebé están melia listos para respirar aire. Los huesos de la rita de burch bebé ahora son bastante firmes rajat para protegerla corrine se mantienen lo suficientemente blandos rajat para moverse al atravesar el canal de [place]. Es posible que sienta entusiasmo, jagjit, ansiedad o miedo. Quizá se pregunte cómo se dará cuenta de si está en trabajo de parto o qué esperar en suresh momento. Trate de ser flexible con herber expectativas respecto del nacimiento. Dado que cada nacimiento es diferente, no hay manera de saber exactamente cómo será burch parto. Esta hoja de cuidados la ayudará a saber qué esperar y cómo prepararse. Le podría facilitar el parto.  
Si todavía no le musa aplicado la vacuna Tdap (tétanos, difteria y tos ferina) ayaz stefan embarazo, hable con saenz médico acerca de aplicársela. Anibal Isidro a proteger a saenz recién nacido contra la infección por tos ferina. En la semana 36, a la mayoría de las mujeres se les hace lennox prueba de estreptococos del dyana B (GBS, por hreber siglas en inglés). Los estreptococos del dyana B son bacterias comunes que pueden vivir en la vagina y el recto. Pueden hacer que saenz bebé se enferme después del parto. Si el resultado es positivo, usted recibirá antibióticos ayaz el trabajo de [place]. Los medicamentos evitarán que saenz bebé contraiga las bacterias. La atención de seguimiento es lennox parte clave de saenz tratamiento y seguridad. Asegúrese de hacer y acudir a todas las citas, y llame a saenz médico si está teniendo problemas. También es lennox buena idea saber los resultados de los exámenes y mantener lennox lista de los medicamentos que roxana. Cómo puede cuidarse en el hogar? [place] alternativas para aliviar el dolor · El dolor se manifiesta de modo diferente en cada chanda. Hable con saenz médico acerca de herber sentimientos sobre el dolor. · Puede elegir entre varias formas de aliviar el dolor. Estas incluyen medicamentos o técnicas de respiración, así rajat medidas para estar cómoda. Usted puede utilizar más de [place] opción. · Si elige un analgésico (medicamento para el dolor) ayaz el trabajo de [place], hable con saenz médico acerca de herber opciones. Algunos medicamentos reducen la ansiedad y [place] [facility] a aliviar parte del dolor. Otros adormecen la parte inferior del cuerpo para que no sienta dolor. · Asegúrese de decirle a saenz médico acerca de saenz elección de analgésico antes de empezar el trabajo de parto o muy temprano en el [place]. Es posible que pueda cambiar de parecer a medida que avanza el [place]. · Liana vez se duerme a lennox chanda con medicamentos administrados a través de lennox máscara o por vía intravenosa (IV). Sherin Parvez y [place] · La primera etapa del [place] de parto se divide en mamie fases: latente, activa y de transición. ¨ La mayoría de las mujeres experimentan la fase latente del [place] de parto en herber hogares. Usted puede [employer] o descansar, comer refrigerios livianos, beber líquidos natanael y comenzar a contar las contracciones. ¨ Cuando advierta que se le vuelve difícil hablar ayaz lennox contracción, es posible que esté por pasar a la fase activa. Ayaz la fase Natalie Ganja, debería ir al hospital si no está allí aún. ¨ Usted está en la fase activa cuando tiene contracciones cada 3 o 4 minutos y duggan alrededor de 60 segundos. El swapnil uterino comienza a abrirse con Dilan Lass. ¨ Si se le rompe la jv, las contracciones serán más intensas y más frecuentes. ¨ Ayaz la fase de transición, el swapnil uterino se estira y las contracciones se producen con Dilan Lass. ¨ Quizá tenga deseos de pujar, sin embargo es posible que el swapnil uterino aún no esté preparado. El médico le dirá cuándo pujar. · La segunda etapa comienza cuando el swapnil uterino se abre por completo y usted está lista para pujar. ¨ Las contracciones son muy intensas a fin de empujar al bebé por el canal de parto. ¨ Sentirá la necesidad de pujar. Podría sentir rajat si tuviera ganas de evacuar el intestino. ¨ Quizás la entrenen a [place] 41 contracciones. Estas contracciones serán muy intensas corrine no ocurrirán con tanta frecuencia. Puede descansar un poco entre contracciones. ¨ Es posible que esté sensible e irritable. Es posible que no se dé cuenta de lo que pasa a saenz alrededor. ¨ Un último esfuerzo y habrá nacido saenz bebé. · La tercera etapa ocurre cuando con unas cuantas contracciones más se expulsa la placenta. [place] puede durar 30 minutos o menos. · La cuarta etapa es la de recuperación. Es posible que se sienta abrumada con las emociones y exhausta corrine alerta. [place] es un buen momento para comenzar el amamantamiento. Dónde puede encontrar más información en inglés? Lilli Silverio a [email or web address]-james.[email or web address] Marylin Tapia F158 en la búsqueda para aprender más acerca de \"Semanas 34 a 39 de burch embarazo: Instrucciones de cuidado - [ Nobie  34 to 39 of Your Pregnancy: Care Instructions ]. \" 
Revisado: 16 marzo, 2017 Versión del contenido: 11.4 © 6218-7308 Healthwise, Incorporated. Las instrucciones de cuidado fueron adaptadas bajo licencia por Good [employer] (which disclaims liability or warranty for this information). Si usted tiene [place] afección médica o sobre estas instrucciones, siempre pregunte a burch profesional de bryanna. Healthwise, Incorporated niega toda garantía o responsabilidad por burch uso de esta información. Aprenda cuándo llamar a burch médico ayaz el embarazo (después de 20 semanas) - [ Learning About When to Call Your Doctor During Pregnancy (After 20 Weeks) ] Instrucciones de cuidado Es normal que tenga inquietudes acerca de lo que podría ser un problema ayaz el [facility]. Aunque la mayoría de las mujeres embarazadas no tienen ningún problema grave, es importante saber cuándo llamar a burch médico si tiene determinados síntomas o señales de trabajo de [place]. Estas son algunas sugerencias generales. Burch médico puede darle más información sobre cuándo llamar. Cuándo llamar a burch médico (después de 20 semanas) Llame al 911 en cualquier momento que sospeche que puede necesitar atención de [place]. Por ejemplo, llame si: · Tiene sangrado vaginal intenso. · Tiene dolor repentino e intenso en el abdomen. · Se desmayó (perdió el conocimiento). · Tiene lennox convulsión. · Ve o siente el cordón umbilical. 
· Jennifer que está a punto de pineda a christiano a burch bebé y no puede llegar en forma odell al hospital. 
Lanice Em a burch médico ahora mismo o busque atención médica inmediata si: · Tiene sangrado vaginal. 
· Tiene dolor en el abdomen. · Tiene fiebre. · Tiene síntomas de preeclampsia, tales rajat: ¨ Hinchazón repentina de la davy, las gray o los pies. ¨ Problemas nuevos con la visión (rajat oscurecimiento de la visión o visión borrosa). ¨ Dolor de rita intenso. · Tiene lennox pérdida repentina de líquido por la vagina. (Piensa que rompió la jv). · Jennifer que puede estar en [place]. [place] significa que usted ha tenido al menos 4 contracciones en 20 minutos o al menos 8 contracciones en [employer]. · Nota que saenz bebé ha dejado de moverse o lo hace mucho menos de lo habitual. 
· Tiene síntomas de lennox infección del tracto urinario. Estos pueden incluir: ¨ Dolor o ardor al orinar. ¨ Necesidad de orinar con frecuencia sin poder eliminar mucha orina. ¨ Dolor en el flanco, que se encuentra hamilton debajo de la caja torácica y [place] de la cintura en un lado de la espalda. ¨ Jonathan en la orina. Preste especial atención a los cambios en saenz bryanna y asegúrese de comunicarse con saenz médico si: · Tiene flujo vaginal con un olor desagradable. · Tiene cambios en la piel, tales rajat: 
¨ Salpullido. ¨ Comezón. ¨ Color amarillento en la piel. · Tiene otras inquietudes acerca de saenz embarazo. Si tiene signos de trabajo de parto al llegar a las [address] Si tiene señales de Catal[place] Cee a las [address] [facility] o [place], es posible que saenz médico le diga que llame cuando saenz trabajo de parto se vuelva [place]. Los síntomas del trabajo de parto activo incluyen: · Contracciones que son regulares. · Contracciones a intervalos de menos de 5 minutos. · Contracciones ayaz las cuales es difícil hablar. La atención de seguimiento es lennox parte clave de saenz tratamiento y seguridad. Asegúrese de hacer y acudir a todas las citas, y llame a saenz médico si está teniendo problemas. También es lennox buena idea saber los resultados de los exámenes y mantener lennox lista de los medicamentos que roxana. Dónde puede encontrar más información en inglés? Soham Chew a http://[email or web address]-james.info/. Escriba G187 en la búsqueda para aprender más acerca de \"Aprenda cuándo llamar a saenz médico ayaz el embarazo (después de 20 semanas) - [ Learning About When to Call Your Doctor During Pregnancy (After 20 Weeks) ]. \" 
Revisado: 16 marzo, 2017 Versión del contenido: 11.4 © 1399-0387 Healthwise, Incorporated. Las instrucciones de cuidado fueron adaptadas bajo licencia por Good Help Connections (which disclaims liability or warranty for this information). Si usted tiene [place] afección médica o sobre estas instrucciones, siempre pregunte a saenz profesional de bryanna. Healthwise, Incorporated niega toda garantía o responsabilidad por saenz uso de esta información. Introducing [facility] & HEALTH SERVICES! [facility] introduces [email or web address] patient portal. Now you can access parts of your medical record, email your doctor's office, and request medication refills online. 1. In your internet browser, go to [email or web address] Life 2. Click on the First Time User? Click Here link in the Sign In box. You will see the New Member Sign Up page. 3. Enter your [email or web address] Access Code exactly as it appears below. You will not need to use this code after youve completed the sign-up process. If you do not sign up before the expiration date, you must request a new code. · [email or web address] Access Code: [email or web address] Expires: 5/28/2018  6:09 PM 
 
4. Enter the last four digits of your Social Security Number (xxxx) and Date of Birth (mm/dd/yyyy) as indicated and click Submit. You will be taken to the next sign-up page. 5. Create a "[employer]t ID. This will be your [email or web address] login ID and cannot be changed, so think of one that is secure and easy to remember. 6. Create a BeautyTicket.com password. You can change your password at any time. 7. Enter your Password Reset Question and Answer. This can be used at a later time if you forget your password. 8. Enter your e-mail address.  You will receive e-mail notification when new information is available in [employer]. 9. Click Sign Up. You can now view and download portions of your medical record. 10. Click the Download Summary menu link to download a portable copy of your medical information. If you have questions, please visit the Frequently Asked Questions section of the [employer] website. Remember, [employer] is NOT to be used for urgent needs. For medical emergencies, dial 911. Now available from your iPhone and Android! Please provide this summary of care documentation to your next provider. Your primary care clinician is listed as Margarita Cuadra. If you have any questions after today's visit, please call [phone].

## 2018-05-11 NOTE — PROGRESS NOTES
Patient presents for 35w6d routine ob follow up. Patient reports fetal movement, denies leakage of fluid and contractions.      Tessa (250 Mg - 1 mL) administered, left glut, lot number [id number], Exp date 05/2020

## 2018-05-11 NOTE — PROGRESS NOTES
Return OB Visit       Subjective:   Jag Mcnamara 33 y.o. G5   STEPHANIE: 6/9/2018, by Ultrasound  GA:  35w6d. Fetal movement - yes  Vaginal bleeding - no  LOF - no  Jayme-davis contractions     Diet - eating well  Exercise - yes  PNV/Other supplements - yes    Allergies- reviewed:   No Known Allergies  Medications- reviewed:   Current Outpatient Prescriptions   Medication Sig    prenatal vit-iron fumarate-fa 27 mg iron- 0.8 mg tab tablet Take 1 Tab by mouth daily. No current facility-administered medications for this visit. Past Medical History- reviewed:  No past medical history on file. Past Surgical History- reviewed:   No past surgical history on file. Social History- reviewed:  Social History     Social History    Marital status: UNKNOWN     Spouse name: N/A    Number of children: N/A    Years of education: N/A     Occupational History    Not on file.      Social History Main Topics    Smoking status: Never Smoker    Smokeless tobacco: Never Used    Alcohol use No    Drug use: No    Sexual activity: Yes     Partners: Male     Other Topics Concern    Not on file     Social History Narrative     Immunizations- reviewed:   Immunization History   Administered Date(s) Administered    Influenza Vaccine (Quad) PF 12/12/2017    Tdap 04/20/2018         Objective:     Visit Vitals    /74    Pulse 71    Temp 98 °F (36.7 °C) (Oral)    Resp 14    Ht 5' 2\" (1.575 m)    Wt 215 lb (97.5 kg)    LMP 08/20/2017 (Exact Date)    SpO2 98%    BMI 39.32 kg/m2       Physical Exam:  GENERAL APPEARANCE: alert, well appearing, in no apparent distress  LUNGS: clear to auscultation, no wheezes, rales or rhonchi, symmetric air entry  HEART: regular rate and rhythm, no murmurs  ABDOMEN: soft, nontender, nondistended, no abnormal masses, no epigastric pain, bowel sounds present, fundal height 39 cm, FHT present at 140 bpm  BACK: no CVA tenderness  UTERUS: gravid  EXTREMITIES: no redness or tenderness in the calves or thighs, no edema  NEUROLOGICAL: alert, oriented, normal speech, no focal findings or movement disorder noted      Labs  Recent Results (from the past 12 hour(s))   AMB POC URINALYSIS DIP STICK AUTO W/O MICRO    Collection Time: 18  2:17 PM   Result Value Ref Range    Color (UA POC) Yellow     Clarity (UA POC) Clear     Glucose (UA POC) Negative Negative    Bilirubin (UA POC) Negative Negative    Ketones (UA POC) Negative Negative    Specific gravity (UA POC) 1.015 1.001 - 1.035    Blood (UA POC) Negative Negative    pH (UA POC) 7.0 4.6 - 8.0    Protein (UA POC) Negative Negative    Urobilinogen (UA POC) 0.2 mg/dL 0.2 - 1    Nitrites (UA POC) Negative Negative    Leukocyte esterase (UA POC) Trace Negative         Assessment   Lauri Huerta 33 y.o. G5   STEPHANIE: 2018, by Ultrasound  GA:  35w6d. ICD-10-CM ICD-9-CM    1. Pregnant and not yet delivered in third trimester Z34.93 V22.1 AMB POC URINALYSIS DIP STICK AUTO W/O MICRO      CULTURE, GENITAL GROUP B STREP   2.  labor in third trimester with  delivery, single or unspecified fetus O60.14X0 644.21    3. Hx of triplet pregnancy in prior pregnancy Z87.59 V13.29    4. Obesity (BMI 35.0-39.9 without comorbidity) E66.9 278.00          Plan     Orders Placed This Encounter    CULTURE, GENITAL GROUP B STREP    AMB POC URINALYSIS DIP STICK AUTO W/O MICRO       IUP:  - Prenatal Labs: A+, AB screen neg, HBsAg neg, G/C neg, HIV NR, T pal neg, Rubella/VZV immune  - GBS done today   Tdap given on   - Ultrasound:  showed single viable IUP, fetal growth is appropriate; EFW is at the 65th percentile. - Cephalic presentation confirmed by ultrasound. Pregnancy complicated by:  - History of PTL (at ?34 weeks) with 2 subsequent SVDs and one pregnancy with triplets -> placenta p01/18. Next week will be last week of Adolph. roblem -> d&c: Continue weekly adolph injections until 36 weeks gestation per [facility].  Last injection was given 05/01/18. Last injection of [facility] given today. - Obesity- 1 Hr GTT done in December due to obesity: 138. Repeat 1hr GTT in previous visit: Normal    Follow up: RTC in 1 week - appt made       Labor precautions discussed, including: Regular painful contractions, lasting for greater than one hour, taking your breath away; any vaginal bleeding; any leakage of fluid; or absent or decreased fetal movement. Call M.D. on call if any of these symptoms or signs occur. I have discussed the diagnosis with the patient and the intended plan as seen in the above orders. The patient has received an after-visit summary and questions were answered concerning future plans. I have discussed medication side effects and warnings with the patient as well. Informed pt to return to the office or go to the ER if she experiences vaginal bleeding, vaginal discharge, leaking of fluid, pelvic cramping.     Pt seen and discussed with Dr. Luisana Haro (attending physician)      Iona Nettles MD  Family Medicine Resident, PGY-1

## 2018-05-14 LAB — B-HEM STREP SPEC QL CULT: POSITIVE

## 2018-05-16 ENCOUNTER — ROUTINE PRENATAL (OUTPATIENT)
Dept: FAMILY MEDICINE CLINIC | Age: 28
End: 2018-05-16

## 2018-05-16 VITALS
HEART RATE: 81 BPM | HEIGHT: 62 IN | WEIGHT: 214 LBS | TEMPERATURE: 98.1 F | DIASTOLIC BLOOD PRESSURE: 81 MMHG | BODY MASS INDEX: 39.38 KG/M2 | SYSTOLIC BLOOD PRESSURE: 118 MMHG | OXYGEN SATURATION: 99 % | RESPIRATION RATE: 16 BRPM

## 2018-05-16 DIAGNOSIS — Z34.93 PREGNANT AND NOT YET DELIVERED, THIRD TRIMESTER: Primary | ICD-10-CM

## 2018-05-16 DIAGNOSIS — O99.213 OBESITY AFFECTING PREGNANCY IN THIRD TRIMESTER: ICD-10-CM

## 2018-05-16 DIAGNOSIS — Z87.898: ICD-10-CM

## 2018-05-16 LAB
BILIRUB UR QL STRIP: NEGATIVE
GLUCOSE UR-MCNC: NEGATIVE MG/DL
KETONES P FAST UR STRIP-MCNC: NEGATIVE MG/DL
PH UR STRIP: 7 [PH] (ref 4.6–8)
PROT UR QL STRIP: NEGATIVE
SP GR UR STRIP: 1.01 (ref 1–1.03)
UA UROBILINOGEN AMB POC: NORMAL (ref 0.2–1)
URINALYSIS CLARITY POC: CLEAR
URINALYSIS COLOR POC: YELLOW
URINE BLOOD POC: NEGATIVE
URINE LEUKOCYTES POC: NORMAL
URINE NITRITES POC: NEGATIVE

## 2018-05-16 NOTE — MR AVS SNAPSHOT
[address] 
[phone] Patient: Gary Johnson MRN: [id number] KKT:[id number] Visit Information Heidi Lara y Maite Personal Médico Departamento Teléfono del Dep. Número de visita 2018 10:30 AM Priscilla Mehta, [facility] [phone] Follow-up Instructions Return in about 1 day (around 2018) for NST and Cervical check . 2018  1:30 PM  
OB VISIT with Ingrid Topete, DO [facility] ([address]) Appt Note: prenatal appointment/NST/cerv check [address] [place] [address]  
[phone]  
  
   
 [address] [facility] [address] Upcoming Health Maintenance Date Due  
 HPV Age 9Y-34Y (1 of 3 - Female 3 Dose Series) 2002 PAP AKA CERVICAL CYTOLOGY 2012 Influenza Age 5 to Adult 2018 DTaP/Tdap/Td series (2 - Td) 2028 [address]  Review Complete El: 2018 Por: Anibal Ernst LPN A partir del:  2018 No Known Allergies Vacunas actuales Revisadas el:  2018 Flojuan manuel Javi Influenza Vaccine (Quad) PF 2017 Tdap 2018 No revisadas esta visita You Were Diagnosed With   
  
 Jaquita Piety Pregnant and not yet delivered, third trimester    -  Primary ICD-10-CM: Z34.93 
ICD-9-CM: V22.1 Obesity affecting pregnancy in third trimester     ICD-10-CM: O99.213 ICD-9-CM: 649.13 Hx of triplet pregnancy in prior pregnancy     ICD-10-CM: Z87.59 
ICD-9-CM: V13.29  labor in third trimester with  delivery, single or unspecified fetus     ICD-10-CM: O59.15X0 ICD-9-CM: 242.81 Partes vitales PS Pulso Temperatura Resp [place] ( percentil de crecimiento) Peso (percentil de crecimiento)  
 118/81 81 98.1 °F (36.7 °C) (Oral) 16 5' 2\" (1.575 m) 214 lb (97.1 kg) LMP (última lizzie) SpO2 BMI ([facility]) Estado obstétrico Estatus de tabaquísmo 08/20/2017 (Exact Date) 99% 39.14 kg/m2 Pregnant Never Smoker BMI and BSA Data Body Mass Index Body Surface Area  
 39.14 kg/m 2 2.06 m 2 Preferred Pharmacy Pharmacy Name Phone [address], [address] [place] [address] [phone] Saenz lista de medicamentos actualizada Leata Sb actualizada 5/16/18 11:57 AM.  Elizabeth Lied use saenz lista de medicamentos más reciente. prenatal vit-iron fumarate-fa 27 mg iron- 0.8 mg Tab tablet Take 1 Tab by mouth daily. Hicimos lo siguiente AMB POC URINALYSIS DIP STICK AUTO W/O MICRO [84450 CPT(R)] Instrucciones de seguimiento Return in about 1 day (around 5/17/2018) for NST and Cervical check . Instrucciones para el Paciente Semanas 34 a 36 de saenz embarazo: Instrucciones de cuidado - [ Neldon Chant 34 to 39 of Your Pregnancy: Care Instructions ] Instrucciones de cuidado A estas [place], saenz bebé y saenz abdomen habrán crecido considerablemente. Melia es [place] de pineda a christiano. En un embarazo a término se puede pineda a [employer] 40 y 43. Los pulmones de saenz bebé están melia listos para respirar aire. Los huesos de la rita de saenz bebé ahora son bastante firmes rajat para protegerla corrine se mantienen lo suficientemente blandos rajat para moverse al atravesar el canal de [place]. Es posible que sienta entusiasmo, jagjit, ansiedad o miedo. Quizá se pregunte cómo se dará cuenta de si está en trabajo de parto o qué esperar en suresh momento. Trate de ser flexible con herber expectativas respecto del nacimiento. Dado que cada nacimiento es diferente, no hay manera de saber exactamente cómo será saenz parto. Esta hoja de cuidados la ayudará a saber qué esperar y cómo prepararse. Le podría facilitar el parto.  
Si todavía no le musa aplicado la vacuna Tdap (tétanos, difteria y tos ferina) ayaz stefan embarazo, hable con saenz médico acerca de aplicársela. Lemon Riggers a proteger a saenz recién nacido contra la infección por tos ferina. En la semana 36, a la mayoría de las mujeres se les hace lennox prueba de estreptococos del dyana B (GBS, por herber siglas en inglés). Los estreptococos del dyana B son bacterias comunes que pueden vivir en la vagina y el recto. Pueden hacer que saenz bebé se enferme después del parto. Si el resultado es positivo, usted recibirá antibióticos ayaz el trabajo de [place]. Los medicamentos evitarán que saenz bebé contraiga las bacterias. La atención de seguimiento es lennox parte clave de saenz tratamiento y seguridad. Asegúrese de hacer y acudir a todas las citas, y llame a saenz médico si está teniendo problemas. También es lennox buena idea saber los resultados de los exámenes y mantener lennox lista de los medicamentos que roxana. Cómo puede cuidarse en el hogar? Kianna alternativas para aliviar el dolor · El dolor se manifiesta de modo diferente en cada chanda. Hable con saenz médico acerca de herber sentimientos sobre el dolor. · Puede elegir entre varias formas de aliviar el dolor. Estas incluyen medicamentos o técnicas de respiración, así rajat medidas para estar cómoda. Usted puede utilizar más de Robertson Bers opción. · Si elige un analgésico (medicamento para el dolor) ayaz el trabajo de [place], hable con saenz médico acerca de herber opciones. Algunos medicamentos reducen la ansiedad y [facility] a aliviar parte del dolor. Otros adormecen la parte inferior del cuerpo para que no sienta dolor. · Asegúrese de decirle a saenz médico acerca de saenz elección de analgésico antes de empezar el trabajo de parto o muy temprano en el [place]. Es posible que pueda cambiar de parecer a medida que avanza el [place]. · Liana vez se duerme a lennox chanda con medicamentos administrados a través de lennox máscara o por vía intravenosa (IV). Pam Tariq · La primera etapa del Marvina [place] de parto se divide en mamie fases: latente, activa y de transición. ¨ La mayoría de las mujeres experimentan la fase latente del Marvina Mickie de parto en herber hogares. Usted puede [employer] o descansar, comer refrigerios livianos, beber líquidos natanael y comenzar a contar las contracciones. ¨ Cuando advierta que se le vuelve difícil hablar ayaz lennox contracción, es posible que esté por pasar a la fase activa. Ayaz la fase Jerrald Rector, debería ir al hospital si no está allí aún. ¨ Usted está en la fase activa cuando tiene contracciones cada 3 o 4 minutos y duggan alrededor de 60 segundos. El swapnil uterino comienza a abrirse con [place]. ¨ Si se le rompe la jv, las contracciones serán más intensas y más frecuentes. ¨ Ayaz la fase de transición, el swapnil uterino se estira y las contracciones se producen con [place] Gemma. ¨ Quizá tenga deseos de pujar, sin embargo es posible que el swapnil uterino aún no esté preparado. El médico le dirá cuándo pujar. · La segunda etapa comienza cuando el swapnil uterino se abre por completo y usted está lista para pujar. ¨ Las contracciones son muy intensas a fin de empujar al bebé por el canal de parto. ¨ Sentirá la necesidad de pujar. Podría sentir rajat si tuviera ganas de evacuar el intestino. ¨ Quizás la entrenen a [place] 41 contracciones. Estas contracciones serán muy intensas corrine no ocurrirán con tanta frecuencia. Puede descansar un poco entre contracciones. ¨ Es posible que esté sensible e irritable. Es posible que no se dé cuenta de lo que pasa a saenz alrededor. ¨ Un último esfuerzo y habrá nacido saenz bebé. · La tercera etapa ocurre cuando con unas cuantas contracciones más se expulsa la placenta. [place] puede durar 30 minutos o menos. · La cuarta etapa es la de recuperación. Es posible que se sienta abrumada con las emociones y exhausta corrine alerta. Starla es un buen momento para comenzar el amamantamiento. Dónde puede encontrar más información en inglés? Phil Linares a [email or web address] Nasra Fails Q690 en la búsqueda para aprender más acerca de \"Semanas 34 a 39 de burch embarazo: Instrucciones de cuidado - [ Sussy Breech 34 to 39 of Your Pregnancy: Care Instructions ]. \" 
Revisado: 16 marzo, 2017 Versión del contenido: 11.4 © 6277-7797 Healthwise, Incorporated. Las instrucciones de cuidado fueron adaptadas bajo licencia por [employer] Connections (which disclaims liability or warranty for this information). Si usted tiene [place] afección médica o sobre estas instrucciones, siempre pregunte a burch profesional de bryanna. Healthwise, Incorporated niega toda garantía o responsabilidad por burch uso de esta información. Aprenda cuándo llamar a burch médico ayaz el embarazo (después de 20 semanas) - [ Learning About When to Call Your Doctor During Pregnancy (After 20 Weeks) ] Instrucciones de cuidado Es normal que tenga inquietudes acerca de lo que podría ser un problema ayaz el [facility]. Aunque la mayoría de las mujeres embarazadas no tienen ningún problema grave, es importante saber cuándo llamar a burch médico si tiene determinados síntomas o señales de trabajo de [place]. Estas son algunas sugerencias generales. Burch médico puede darle más información sobre cuándo llamar. Cuándo llamar a burch médico (después de 20 semanas) Llame al 911 en cualquier momento que sospeche que puede necesitar atención de [place]. Por ejemplo, llame si: · Tiene sangrado vaginal intenso. · Tiene dolor repentino e intenso en el abdomen. · Se desmayó (perdió el conocimiento). · Tiene lennox convulsión. · Ve o siente el cordón umbilical. 
· Jennifer que está a punto de pineda a christiano a bucrh bebé y no puede llegar en forma odell al hospital. 
[place] a burch médico ahora mismo o busque atención médica inmediata si: · Tiene sangrado vaginal. 
· Tiene dolor en el abdomen. · Tiene fiebre. · Tiene síntomas de preeclampsia, tales rajat: ¨ Hinchazón repentina de la davy, las gray o los pies. ¨ Problemas nuevos con la visión (rajat oscurecimiento de la visión o visión borrosa). ¨ Dolor de rita intenso. · Tiene lennox pérdida repentina de líquido por la vagina. (Piensa que rompió la jv). · Jennifer que puede estar en Flateyri. [place] significa que usted ha tenido al menos 4 contracciones en 20 minutos o al menos 8 contracciones en [employer]. · Nota que saenz bebé ha dejado de moverse o lo hace mucho menos de lo habitual. 
· Tiene síntomas de lennox infección del tracto urinario. Estos pueden incluir: ¨ Dolor o ardor al orinar. ¨ Necesidad de orinar con frecuencia sin poder eliminar mucha orina. ¨ Dolor en el flanco, que se encuentra hamilton debajo de la caja torácica y [place] de la cintura en un lado de la espalda. ¨ Jonathan en la orina. Preste especial atención a los cambios en saenz bryanna y asegúrese de comunicarse con saenz médico si: · Tiene flujo vaginal con un olor desagradable. · Tiene cambios en la piel, tales rajat: 
¨ Salpullido. ¨ Comezón. ¨ Color amarillento en la piel. · Tiene otras inquietudes acerca de saenz embarazo. Si tiene signos de trabajo de parto al llegar a las [address] Si tiene señales de Gabby Cee a las 37 [address] [employer] [place] [id number] [email or web address], es posible que saenz médico le diga que llame cuando saenz trabajo de parto se vuelva [place]. Los síntomas del trabajo de parto activo incluyen: · Contracciones que son regulares. · Contracciones a intervalos de menos de 5 minutos. · Contracciones ayaz las cuales es difícil hablar. La atención de seguimiento es lennox parte clave de saenz tratamiento y seguridad. Asegúrese de hacer y acudir a todas las citas, y llame a saenz médico si está teniendo problemas. También es lennox buena idea saber los resultados de los exámenes y mantener lennox lista de los medicamentos que roxana. Dónde puede encontrar más información en inglés? Whitney Adler a [email or web address] Escriba Y135 en la búsqueda para aprender más acerca de \"Aprenda cuándo llamar a saenz médico ayaz el embarazo (después de 20 semanas) - [ Learning About When to Call Your Doctor During Pregnancy (After 20 Weeks) ]. \" 
Revisado: 16 marzo, 2017 Versión del contenido: 11.4 © 5835-5556 Healthwise, Incorporated. Las instrucciones de cuidado fueron adaptadas bajo licencia por Good Help Connections (which disclaims liability or warranty for this information). Si usted tiene [place] afección médica o sobre estas instrucciones, siempre pregunte a saenz profesional de bryanna. Healthwise, Incorporated niega toda garantía o responsabilidad por saenz uso de esta información. Introducing [facility]! Tabatha Starkey introduces Siri patient portal. Now you can access parts of your medical record, email your doctor's office, and request medication refills online. 1. In your internet browser, go to [email or web address] 2. Click on the First Time User? Click Here link in the Sign In box. You will see the New Member Sign Up page. 3. Enter your Siri Access Code exactly as it appears below. You will not need to use this code after youve completed the sign-up process. If you do not sign up before the expiration date, you must request a new code. · Siri Access Code: [email or web address] Expires: 5/28/2018  6:09 PM 
 
4. Enter the last four digits of your Social Security Number (xxxx) and Date of Birth (mm/dd/yyyy) as indicated and click Submit. You will be taken to the next sign-up page. 5. Create a Repairogent ID. This will be your Siri login ID and cannot be changed, so think of one that is secure and easy to remember. 6. Create a Siri password. You can change your password at any time. 7. Enter your Password Reset Question and Answer. This can be used at a later time if you forget your password. 8. Enter your e-mail address.  You will receive e-mail notification when new information is available in OpenChime. 9. Click Sign Up. You can now view and download portions of your medical record. 10. Click the Download Summary menu link to download a portable copy of your medical information. If you have questions, please visit the Frequently Asked Questions section of the OpenChime website. Remember, OpenChime is NOT to be used for urgent needs. For medical emergencies, dial 911. Now available from your iPhone and Android! Please provide this summary of care documentation to your next provider. Your primary care clinician is listed as [place]. If you have any questions after today's visit, please call [phone].

## 2018-05-16 NOTE — PATIENT INSTRUCTIONS
Semanas 34 a 36 de saenz embarazo: Instrucciones de cuidado - [ Ashby Jarvis 34 to 39 of Your Pregnancy: Care Instructions ]  Instrucciones de cuidado    A estas [place], saenz bebé y saenz abdomen habrán crecido considerablemente. Melia es [place] de pineda a christiano. En un embarazo a término se puede pineda a [employer] 40 y 43. Los pulmones de saenz bebé están melia listos para respirar aire. Los huesos de la rita de sanez bebé ahora son bastante firmes rajat para protegerla corrine se mantienen lo suficientemente blandos rajat para moverse al atravesar el canal de [place]. Es posible que sienta entusiasmo, jagjit, ansiedad o miedo. Quizá se pregunte cómo se dará cuenta de si está en trabajo de parto o qué esperar en suresh momento. Trate de ser flexible con herber expectativas respecto del nacimiento. Dado que cada nacimiento es diferente, no hay manera de saber exactamente cómo será saenz parto. Esta hoja de cuidados la ayudará a saber qué esperar y cómo prepararse. Le podría facilitar el parto. Si todavía no le musa aplicado la vacuna Tdap (tétanos, difteria y tos [place]) ayaz stefan [place], hable con saenz médico acerca de aplicársela. Lemon [employer]gers a proteger a saenz recién nacido contra la infección por tos ferina. En la semana 36, a la mayoría de las mujeres se les hace lennox prueba de estreptococos del dyana B (GBS, por herber siglas en inglés). Los estreptococos del dyana B son bacterias comunes que pueden vivir en la vagina y el recto. Pueden hacer que saenz bebé se enferme después del parto. Si el resultado es positivo, usted recibirá antibióticos ayaz el trabajo de [place]. Los medicamentos evitarán que saenz bebé contraiga las bacterias. La atención de seguimiento es lennox parte clave de saenz tratamiento y seguridad. Asegúrese de hacer y acudir a todas las citas, y llame a saenz médico si está teniendo problemas. También es lennox buena idea saber los resultados de los exámenes y mantener lennox lista de los medicamentos que roxana.   ¿Cómo puede cuidarse en el hogar? Aprenda sobre las alternativas para aliviar el dolor  · El dolor se manifiesta de modo diferente en cada chanda. Hable con saenz médico acerca de herber sentimientos sobre el dolor. · Puede elegir entre varias formas de aliviar el dolor. Estas incluyen medicamentos o técnicas de respiración, así rajat medidas para estar cómoda. Usted puede utilizar más de Anneliese Jensen opción. · Si elige un analgésico (medicamento para el dolor) ayaz el trabajo de [place], hable con saenz médico acerca de herber opciones. Algunos medicamentos reducen la ansiedad y [facility] Territories a aliviar parte del dolor. Otros adormecen la parte inferior del cuerpo para que no sienta dolor. · Asegúrese de decirle a saenz médico acerca de saenz elección de analgésico antes de empezar el trabajo de parto o muy temprano en el Cherrie Bunde de [place]. Es posible que pueda cambiar de parecer a medida que avanza el Cherrie [place]unde de [place]. · Liana vez se duerme a lennox chanda con medicamentos administrados a través de lennox máscara o por vía intravenosa (IV). Trabajo de parto y [place]  · La primera etapa del Cherrie Bunde de parto se divide en mamie fases: Марина Cirri y de transición. ¨ La mayoría de las mujeres experimentan la fase latente del Cherrie [place] de parto en herber hogares. Usted puede [employer] o descansar, comer refrigerios livianos, beber líquidos natanael y comenzar a contar las contracciones. ¨ Cuando advierta que se le vuelve difícil hablar ayaz lennox contracción, es posible que esté por pasar a la fase activa. Ayaz la fase Harris Clam, debería ir al hospital si no está allí aún. ¨ Usted está en la fase activa cuando tiene contracciones cada 3 o 4 minutos y duggan alrededor de 60 segundos. El swapnil uterino comienza a abrirse con Duwaine Founds. ¨ Si se le rompe la jv, las contracciones serán más intensas y más frecuentes. ¨ Ayaz la fase de transición, el swapnil uterino se estira y las contracciones se producen con Duwaine Founds.   ¨ Quizá tenga deseos de pujar, sin embargo es posible que el swapnil uterino aún no esté preparado. El médico le dirá cuándo pujar. · La segunda etapa comienza cuando el swapnil uterino se abre por completo y usted está lista para pujar. ¨ Las contracciones son muy intensas a fin de empujar al bebé por el canal de parto. ¨ Sentirá la necesidad de pujar. Podría sentir rajat si tuviera ganas de evacuar el intestino. ¨ Quizás la entrenen a [place] 41 contracciones. Estas contracciones serán muy intensas corrine no ocurrirán con tanta frecuencia. Puede descansar un poco entre contracciones. ¨ Es posible que esté sensible e irritable. Es posible que no se dé cuenta de lo que pasa a saenz alrededor. ¨ Un último esfuerzo y habrá nacido saenz bebé. · La tercera etapa ocurre cuando con unas cuantas contracciones más se expulsa la placenta. [place] puede durar 30 minutos o menos. · La cuarta etapa es la de recuperación. Es posible que se sienta abrumada con las emociones y exhausta corrine alerta. Starla es un buen momento para comenzar el amamantamiento. ¿Dónde puede encontrar más información en inglés? Byron Carline a [email or web address] Chata Ashraf J859 en la búsqueda para aprender más acerca de \"Semanas 34 a 39 de saenz embarazo: Instrucciones de cuidado - [ Verla Moles 34 to 39 of Your Pregnancy: Care Instructions ]. \"  Revisado: 16 marzo, 2017  Versión del contenido: 11.4  © 0081-6863 Healthwise, Incorporated. Las instrucciones de cuidado fueron adaptadas bajo licencia por Good Help Connections (which disclaims liability or warranty for this information). Si usted tiene [place] afección médica o sobre estas instrucciones, siempre pregunte a saenz profesional de bryanna. [place], Incorporated niega toda garantía o responsabilidad por saenz uso de esta información.        Aprenda cuándo llamar a saenz médico ayaz el embarazo (después de 20 semanas) - [ Learning About When to Call Your Doctor During Pregnancy (After 20 Weeks) ]  Instrucciones de cuidado  Es normal que tenga inquietudes acerca de lo que podría ser un problema ayaz el embarazo. Aunque la mayoría de las mujeres embarazadas no tienen ningún problema grave, es importante saber cuándo llamar a saenz médico si tiene determinados síntomas o señales de trabajo de [place]. Estas son algunas sugerencias generales. Saenz médico puede darle más información sobre cuándo llamar. Cuándo llamar a saenz médico (después de 20 semanas)  Llame al 911 en cualquier momento que sospeche que puede necesitar atención de [place]. Por ejemplo, llame si:  · Tiene sangrado vaginal intenso. · Tiene dolor repentino e intenso en el abdomen. · Se desmayó (perdió el conocimiento). · Tiene lennox convulsión. · Ve o siente el cordón umbilical.  · Jennifer que está a punto de pineda a christiano a saenz bebé y no puede llegar en forma odell al hospital.  Dinora Collar a saenz médico ahora mismo o busque atención médica inmediata si:  · Tiene sangrado vaginal.  · Tiene dolor en el abdomen. · Tiene fiebre. · Tiene síntomas de preeclampsia, tales rajat:  ¨ Hinchazón repentina de la davy, las gray o los pies. ¨ Problemas nuevos con la visión (rajat oscurecimiento de la visión o visión borrosa). ¨ Dolor de rita intenso. · Tiene lennox pérdida repentina de líquido por la vagina. (Piensa que rompió la jv). · Jennifer que puede estar en Flateyri. [place] significa que usted ha tenido al menos 4 contracciones en 20 minutos o al menos 8 contracciones en Alexa Devi. · Nota que saenz bebé ha dejado de moverse o lo hace mucho menos de lo habitual.  · Tiene síntomas de lennox infección del tracto urinario. Estos pueden incluir:  ¨ Dolor o ardor al orinar. ¨ Necesidad de orinar con frecuencia sin poder eliminar mucha orina. ¨ Dolor en el flanco, que se encuentra hamilton debajo de la caja torácica y [place] de la cintura en un lado de la espalda. ¨ Jonathan en la orina.   Preste especial atención a los cambios en saenz bryanna y asegúrese de comunicarse con saenz médico si:  · Tiene flujo vaginal con un olor desagradable. · Tiene cambios en la piel, tales rajat:  ¨ Salpullido. ¨ Comezón. ¨ Color amarillento en la piel. · Tiene otras inquietudes acerca de saenz embarazo. Si tiene signos de trabajo de parto al llegar a las 37 [facility] o más  Si tiene señales de [place] de parto a las 37 semanas o [place], es posible que saenz médico le diga que llame cuando saenz trabajo de parto se vuelva más [place]. Los síntomas del trabajo de parto activo incluyen:  · Contracciones que son regulares. · Contracciones a intervalos de menos de 5 minutos. · Contracciones ayaz las cuales es difícil hablar. La atención de seguimiento es lennox parte clave de saenz tratamiento y seguridad. Asegúrese de hacer y acudir a todas las citas, y llame a saenz médico si está teniendo problemas. También es lennox buena idea saber los resultados de los exámenes y mantener lennox lista de los medicamentos que roxana. ¿Dónde puede encontrar más información en inglés? Mathieu Persaud a [email or web address] Escriba C700 en la búsqueda para aprender más acerca de \"Aprenda cuándo llamar a saenz médico ayaz el embarazo (después de 20 semanas) - [ Learning About When to Call Your Doctor During Pregnancy (After 20 Weeks) ]. \"  Revisado: 16 marzo, 2017  Versión del contenido: 11.4  © 8324-4173 Healthwise, Incorporated. Las instrucciones de cuidado fueron adaptadas bajo licencia por Good Help Connections (which disclaims liability or warranty for this information). Si usted tiene [employer] afección médica o sobre estas instrucciones, siempre pregunte a saenz profesional de bryanna. Healthwise, Incorporated niega toda garantía o responsabilidad por saenz uso de esta información.

## 2018-05-16 NOTE — PROGRESS NOTES
Return OB Visit       Subjective:   Gary Johnson 33 y.o. G5   STEPHANIE: 6/9/2018, by Ultrasound  GA:  36w4d. Pregnancy complicated by history of PTL at 34 weeks? And obesity. Contractions- started having contractions at midnight that happened every 30 minutes. She showered and they calmed down. This morning started again but they're on and off. She describes them as   Fetal movement - yes  Vaginal bleeding - no  LOF - no    Diet - eating well  Exercise - no  PNV/Other supplements - yes    Allergies- reviewed:   No Known Allergies  Medications- reviewed:   Current Outpatient Prescriptions   Medication Sig    prenatal vit-iron fumarate-fa 27 mg iron- 0.8 mg tab tablet Take 1 Tab by mouth daily. No current facility-administered medications for this visit. Past Medical History- reviewed:  No past medical history on file. Past Surgical History- reviewed:   No past surgical history on file. Social History- reviewed:  Social History     Social History    Marital status: UNKNOWN     Spouse name: N/A    Number of children: N/A    Years of education: N/A     Occupational History    Not on file.      Social History Main Topics    Smoking status: Never Smoker    Smokeless tobacco: Never Used    Alcohol use No    Drug use: No    Sexual activity: Yes     Partners: Male     Other Topics Concern    Not on file     Social History Narrative     Immunizations- reviewed:   Immunization History   Administered Date(s) Administered    Influenza Vaccine (Quad) PF 12/12/2017    Tdap 04/20/2018         Objective:     Visit Vitals    /81    Pulse 81    Temp 98.1 °F (36.7 °C) (Oral)    Resp 16    Ht 5' 2\" (1.575 m)    Wt 214 lb (97.1 kg)    LMP 08/20/2017 (Exact Date)    SpO2 99%    BMI 39.14 kg/m2       Physical Exam:  GENERAL APPEARANCE: alert, well appearing, in no apparent distress  LUNGS: clear to auscultation, no wheezes, rales or rhonchi, symmetric air entry  HEART: regular rate and rhythm, no murmurs  ABDOMEN: soft, nontender, nondistended, no abnormal masses, no epigastric pain, bowel sounds present, fundal height 40 cm, FHT present at 135 bpm  BACK: no CVA tenderness  UTERUS: gravid  EXTREMITIES: no redness or tenderness in the calves or thighs, no edema  NEUROLOGICAL: alert, oriented, normal speech, no focal findings or movement disorder noted    CERVIX: 2-3 dilated, 50 effaced, -3 station (chaperoned by Dr. Heavenly Ferraro MD)      Labs  Recent Results (from the past 12 hour(s))   AMB POC URINALYSIS DIP STICK AUTO W/O MICRO    Collection Time: 18 11:03 AM   Result Value Ref Range    Color (UA POC) Yellow     Clarity (UA POC) Clear     Glucose (UA POC) Negative Negative    Bilirubin (UA POC) Negative Negative    Ketones (UA POC) Negative Negative    Specific gravity (UA POC) 1.010 1.001 - 1.035    Blood (UA POC) Negative Negative    pH (UA POC) 7.0 4.6 - 8.0    Protein (UA POC) Negative Negative    Urobilinogen (UA POC) 0.2 mg/dL 0.2 - 1    Nitrites (UA POC) Negative Negative    Leukocyte esterase (UA POC) Trace Negative         Assessment   Demetris Santana 27 y.o. G5   STEPHANIE: 2018, by Ultrasound  GA:  36w4d. ICD-10-CM ICD-9-CM    1. Pregnant and not yet delivered, third trimester Z34.93 V22.1 AMB POC URINALYSIS DIP STICK AUTO W/O MICRO   2. Obesity affecting pregnancy in third trimester O99.213 649.13    3. Hx of triplet pregnancy in prior pregnancy Z87.59 V13.29    4.  labor in third trimester with  delivery, single or unspecified fetus O60.14X0 644.21          Plan     Orders Placed This Encounter    AMB POC URINALYSIS DIP STICK AUTO W/O MICRO       IUP:  - Prenatal Labs: A+, AB screen neg, HBsAg neg, G/C neg, HIV NR, T pal neg, Rubella/VZV immune  - GBS positive.  Patient will need intrapartum Abx  - Tdap given on   - Ultrasound: showed single viable IUP, fetal growth is appropriate; EFW is at the 65th percentile  - Cephalic presentation confirmed [address] on last visit   - NST: FHT: baseline 130, + accelerations, no decels, moderate variability. Category 1 tracing. [place]: irregular, minimal contractions     Pregnancy complicated by:  - History of PTL (at ?34 weeks) with 2 subsequent SVDs and one pregnancy with triplets -> placenta p01/18. Next week will be last week of Tessa. roblem -> d&c: Continue weekly tessa injections until 36 weeks gestation per [facility]. Last injection was given 05/01/18. Last injection of [place] given 05/11.   - Obesity- 1 Hr GTT done in December due to obesity: 138. Repeat 1hr GTT in previous visit: Normal    Follow up: RTC in tomorrow for NST and cervical check - appt made with Dr. Debbie Chavez precautions discussed, including: Regular painful contractions, lasting for greater than one hour, taking your breath away; any vaginal bleeding; any leakage of fluid; or absent or decreased fetal movement. Call M.D. on call if any of these symptoms or signs occur. I have discussed the diagnosis with the patient and the intended plan as seen in the above orders. The patient has received an after-visit summary and questions were answered concerning future plans. I have discussed medication side effects and warnings with the patient as well. Informed pt to return to the office or go to the ER if she experiences vaginal bleeding, vaginal discharge, leaking of fluid, pelvic cramping.     Pt seen and discussed with Dr. Kalvin Rubinstein (attending physician)      Deep Payan MD  Family Medicine Resident, PGY-1

## 2018-05-17 ENCOUNTER — ROUTINE PRENATAL (OUTPATIENT)
Dept: FAMILY MEDICINE CLINIC | Age: 28
End: 2018-05-17

## 2018-05-17 VITALS
BODY MASS INDEX: 39.56 KG/M2 | WEIGHT: 215 LBS | HEART RATE: 79 BPM | OXYGEN SATURATION: 98 % | DIASTOLIC BLOOD PRESSURE: 73 MMHG | HEIGHT: 62 IN | SYSTOLIC BLOOD PRESSURE: 109 MMHG | RESPIRATION RATE: 16 BRPM | TEMPERATURE: 98.1 F

## 2018-05-17 DIAGNOSIS — O09.93 HIGH-RISK PREGNANCY SUPERVISION, THIRD TRIMESTER: Primary | ICD-10-CM

## 2018-05-17 DIAGNOSIS — O99.213 OBESITY AFFECTING PREGNANCY IN THIRD TRIMESTER: ICD-10-CM

## 2018-05-17 DIAGNOSIS — Z87.51 HISTORY OF PRETERM DELIVERY: ICD-10-CM

## 2018-05-17 LAB
BILIRUB UR QL STRIP: NEGATIVE
GLUCOSE UR-MCNC: NEGATIVE MG/DL
KETONES P FAST UR STRIP-MCNC: NEGATIVE MG/DL
PH UR STRIP: 6.5 [PH] (ref 4.6–8)
PROT UR QL STRIP: NEGATIVE
SP GR UR STRIP: 1.02 (ref 1–1.03)
UA UROBILINOGEN AMB POC: NORMAL (ref 0.2–1)
URINALYSIS CLARITY POC: CLEAR
URINALYSIS COLOR POC: YELLOW
URINE BLOOD POC: NEGATIVE
URINE LEUKOCYTES POC: NORMAL
URINE NITRITES POC: NEGATIVE

## 2018-05-17 NOTE — PROGRESS NOTES
Return OB Visit       Subjective:   Jamaica Calixto 33 y.o. G5  36w5d. STEPHANIE: 2018, by Ultrasound      Reports good FM, no VB, LOF. Seen here yesterday and reported ctx, NST reassuring and cervix 2-3 cm. Asked to return today for repeat NST and cervical check given hx of PTD. Compliant with [place]. Reports continues to have contractions, mostly at night. Reported her last contractions were last night and early this morning. None currently. Immunizations- reviewed:   Immunization History   Administered Date(s) Administered    Influenza Vaccine (Quad) PF 2017    Tdap 2018         Objective:     Visit Vitals    /73 (BP 1 Location: Left arm, BP Patient Position: Sitting)    Pulse 79    Temp 98.1 °F (36.7 °C) (Oral)    Resp 16    Ht 5' 2\" (1.575 m)    Wt 215 lb (97.5 kg)    LMP 2017 (Exact Date)    SpO2 98%    BMI 39.32 kg/m2     SVE 2-3/50/-2  NST: 125, mod boris, +accels, no decels  TOCO: no definite ctx (one questionable over 20 min but patient did not feel)    Labs  No results found for this or any previous visit (from the past 12 hour(s)). Assessment         ICD-10-CM ICD-9-CM    1. High-risk pregnancy supervision, third trimester O09.93 V23.9    2. Obesity affecting pregnancy in third trimester O99.213 649.13    3. History of  delivery Z87.51 V13.21          Plan   26yo [id number] @ 36.5 by 14.3 sono  1.  IUP: IOB labs reviewed, RH pos, AFP tetra neg, GBS pos  2.  Hx PTD: on Tessa. No cervical change from yesterday and no ctx on NST, so reassured. Discussed strict L&D precautions. 3.  Obesity: GTT WNL, measuring 3cm larger so will request growth scan       No orders of the defined types were placed in this encounter. Labor precautions discussed, including: Regular painful contractions, lasting for greater than one hour, taking your breath away; any vaginal bleeding; any leakage of fluid; or absent or decreased fetal movement.  Call M.D. on call if any of these symptoms or signs occur. I have discussed the diagnosis with the patient and the intended plan as seen in the above orders. The patient has received an after-visit summary and questions were answered concerning future plans. I have discussed medication side effects and warnings with the patient as well. Informed pt to return to the office or go to the ER if she experiences vaginal bleeding, vaginal discharge, leaking of fluid, pelvic cramping.       Ingrid Topete, DO

## 2018-05-17 NOTE — MR AVS SNAPSHOT
[address] [facility] [address] [facility] 
[phone] Patient: Dulce Mccann MRN: [id number] XKE: Visit Information Date & Time Provider Department Dept. Phone Encounter #  
 2018  1:30 PM Sherley Lu DO [facility] [phone] [id number]  
  
 2018  2:30 PM  
OB VISIT with Geraline Osgood Vest, DO [facility] ([facility]) Appt Note: [facility] [facility]  
[phone]  
  
   
 [address] [place], [place] [address] [phone] [id number] Upcoming Health Maintenance Date Due  
 HPV Age 9Y-34Y (1 of 3 - Female 3 Dose Series) 2002 PAP AKA CERVICAL CYTOLOGY 2012 Influenza Age 5 to Adult 2018 DTaP/Tdap/Td series (2 - Td) 2028 Allergies as of 2018  Review Complete On: 2018 By: Sherley Lu DO No Known Allergies Current Immunizations  Reviewed on 2018 Name Date Influenza Vaccine (Quad) PF 2017 Tdap 2018 Not reviewed this visit You Were Diagnosed With   
  
 Codes Comments High-risk pregnancy supervision, third trimester    -  Primary ICD-10-CM: O09.93 
ICD-9-CM: V23.9 Obesity affecting pregnancy in third trimester     ICD-10-CM: O99.213 ICD-9-CM: 649.13 History of  delivery     ICD-10-CM: Z87.51 
ICD-9-CM: V13.21 Vitals BP Pulse Temp Resp Height(growth percentile) Weight(growth percentile) 109/73 (BP 1 Location: Left arm, BP Patient Position: Sitting) 79 98.1 °F (36.7 °C) (Oral) 16 5' 2\" (1.575 m) 215 lb (97.5 kg) LMP SpO2 BMI OB Status Smoking Status 2017 (Exact Date) 98% 39.32 kg/m2 Pregnant Never Smoker Vitals History BMI and BSA Data Body Mass Index Body Surface Area  
 39.32 kg/m 2 2.07 m 2 Preferred Pharmacy Pharmacy Name Phone [address], [address] [place] [address] [phone] Your Updated Medication List  
  
   
This list is accurate as of 5/17/18  4:46 PM.  Always use your most recent med list.  
  
  
  
  
 prenatal vit-iron fumarate-fa 27 mg iron- 0.8 mg Tab tablet Take 1 Tab by mouth daily. We Performed the Following AMB POC URINALYSIS DIP STICK AUTO W/O MICRO [11345 CPT(R)] Introducing [facility] & [facility] SERVICES! Ethan Chau introduces [employer] patient portal. Now you can access parts of your medical record, email your doctor's office, and request medication refills online. 1. In your internet browser, go to [email or web address] 2. Click on the First Time User? Click Here link in the Sign In box. You will see the New Member Sign Up page. 3. Enter your [employer] Access Code exactly as it appears below. You will not need to use this code after youve completed the sign-up process. If you do not sign up before the expiration date, you must request a new code. · [employer] Access Code: [email or web address] Expires: 5/28/2018  6:09 PM 
 
4. Enter the last four digits of your Social Security Number (xxxx) and Date of Birth (mm/dd/yyyy) as indicated and click Submit. You will be taken to the next sign-up page. 5. Create a [employer] ID. This will be your [employer] login ID and cannot be changed, so think of one that is secure and easy to remember. 6. Create a [employer] password. You can change your password at any time. 7. Enter your Password Reset Question and Answer. This can be used at a later time if you forget your password. 8. Enter your e-mail address. You will receive e-mail notification when new information is available in [address] 9. Click Sign Up. You can now view and download portions of your medical record. 10. Click the Download Summary menu link to download a portable copy of your medical information. If you have questions, please visit the Frequently Asked Questions section of the [employer]t website. Remember, [employer] is NOT to be used for urgent needs. For medical emergencies, dial 911. Now available from your iPhone and Android! Please provide this summary of care documentation to your next provider. Your primary care clinician is listed as Riki Ramos. If you have any questions after today's visit, please call [phone].

## 2018-05-17 NOTE — PROGRESS NOTES
Chief Complaint   Patient presents with    Routine Prenatal Visit     1. Have you been to the ER, urgent care clinic since your last visit? Hospitalized since your last visit? No    2. Have you seen or consulted any other health care providers outside of the [address] [place] [address] since your last visit? Include any pap smears or colon screening.  No

## 2018-05-20 ENCOUNTER — HOSPITAL ENCOUNTER (INPATIENT)
Age: 28
LOS: 2 days | Discharge: HOME | End: 2018-05-22
Attending: FAMILY MEDICINE | Admitting: OBSTETRICS & GYNECOLOGY
Payer: SELF-PAY

## 2018-05-20 ENCOUNTER — ANESTHESIA EVENT (OUTPATIENT)
Dept: LABOR AND DELIVERY | Age: 28
End: 2018-05-20
Payer: SELF-PAY

## 2018-05-20 ENCOUNTER — ANESTHESIA (OUTPATIENT)
Dept: LABOR AND DELIVERY | Age: 28
End: 2018-05-20
Payer: SELF-PAY

## 2018-05-20 PROBLEM — O47.9 UTERINE CONTRACTIONS DURING PREGNANCY: Status: ACTIVE | Noted: 2018-05-20

## 2018-05-20 LAB
BASOPHILS # BLD: 0 K/UL (ref 0–0.1)
BASOPHILS NFR BLD: 0 % (ref 0–1)
DIFFERENTIAL METHOD BLD: ABNORMAL
EOSINOPHIL # BLD: 0.1 K/UL (ref 0–0.4)
EOSINOPHIL NFR BLD: 1 % (ref 0–7)
ERYTHROCYTE [DISTWIDTH] IN BLOOD BY AUTOMATED COUNT: 13.8 % (ref 11.5–14.5)
HCT VFR BLD AUTO: 37.8 % (ref 35–47)
HGB BLD-MCNC: 12.6 G/DL (ref 11.5–16)
IMM GRANULOCYTES # BLD: 0.1 K/UL (ref 0–0.04)
IMM GRANULOCYTES NFR BLD AUTO: 1 % (ref 0–0.5)
LYMPHOCYTES # BLD: 2.6 K/UL (ref 0.8–3.5)
LYMPHOCYTES NFR BLD: 27 % (ref 12–49)
MCH RBC QN AUTO: 26.9 PG (ref 26–34)
MCHC RBC AUTO-ENTMCNC: 33.3 G/DL (ref 30–36.5)
MCV RBC AUTO: 80.8 FL (ref 80–99)
MONOCYTES # BLD: 0.7 K/UL (ref 0–1)
MONOCYTES NFR BLD: 7 % (ref 5–13)
NEUTS SEG # BLD: 6.3 K/UL (ref 1.8–8)
NEUTS SEG NFR BLD: 64 % (ref 32–75)
NRBC # BLD: 0 K/UL (ref 0–0.01)
NRBC BLD-RTO: 0 PER 100 WBC
PLATELET # BLD AUTO: 267 K/UL (ref 150–400)
PMV BLD AUTO: 9.4 FL (ref 8.9–12.9)
RBC # BLD AUTO: 4.68 M/UL (ref 3.8–5.2)
WBC # BLD AUTO: 9.8 K/UL (ref 3.6–11)

## 2018-05-20 PROCEDURE — 74011000258 HC RX REV CODE- 258: Performed by: OBSTETRICS & GYNECOLOGY

## 2018-05-20 PROCEDURE — 75410000002 HC LABOR FEE PER 1 HR: Performed by: OBSTETRICS & GYNECOLOGY

## 2018-05-20 PROCEDURE — 77030011943

## 2018-05-20 PROCEDURE — 51701 INSERT BLADDER CATHETER: CPT

## 2018-05-20 PROCEDURE — 00HU33Z INSERTION OF INFUSION DEVICE INTO SPINAL CANAL, PERCUTANEOUS APPROACH: ICD-10-PCS | Performed by: ANESTHESIOLOGY

## 2018-05-20 PROCEDURE — 74011250636 HC RX REV CODE- 250/636: Performed by: OBSTETRICS & GYNECOLOGY

## 2018-05-20 PROCEDURE — 77030014125 HC TY EPDRL BBMI -B: Performed by: ANESTHESIOLOGY

## 2018-05-20 PROCEDURE — 99284 EMERGENCY DEPT VISIT MOD MDM: CPT

## 2018-05-20 PROCEDURE — 59025 FETAL NON-STRESS TEST: CPT

## 2018-05-20 PROCEDURE — 74011250636 HC RX REV CODE- 250/636

## 2018-05-20 PROCEDURE — 74011250636 HC RX REV CODE- 250/636: Performed by: ANESTHESIOLOGY

## 2018-05-20 PROCEDURE — 75410000000 HC DELIVERY VAGINAL/SINGLE: Performed by: OBSTETRICS & GYNECOLOGY

## 2018-05-20 PROCEDURE — 65270000029 HC RM PRIVATE

## 2018-05-20 PROCEDURE — 85025 COMPLETE CBC W/AUTO DIFF WBC: CPT | Performed by: OBSTETRICS & GYNECOLOGY

## 2018-05-20 PROCEDURE — 36415 COLL VENOUS BLD VENIPUNCTURE: CPT | Performed by: OBSTETRICS & GYNECOLOGY

## 2018-05-20 PROCEDURE — 10907ZC DRAINAGE OF AMNIOTIC FLUID, THERAPEUTIC FROM PRODUCTS OF CONCEPTION, VIA NATURAL OR ARTIFICIAL OPENING: ICD-10-PCS | Performed by: OBSTETRICS & GYNECOLOGY

## 2018-05-20 PROCEDURE — 75410000003 HC RECOV DEL/VAG/CSECN EA 0.5 HR: Performed by: OBSTETRICS & GYNECOLOGY

## 2018-05-20 PROCEDURE — 76060000078 HC EPIDURAL ANESTHESIA: Performed by: ANESTHESIOLOGY

## 2018-05-20 RX ORDER — OXYTOCIN/RINGER'S LACTATE 20/1000 ML
125-500 PLASTIC BAG, INJECTION (ML) INTRAVENOUS ONCE
Status: ACTIVE | OUTPATIENT
Start: 2018-05-21 | End: 2018-05-21

## 2018-05-20 RX ORDER — IBUPROFEN 800 MG/1
800 TABLET ORAL EVERY 8 HOURS
Status: CANCELLED | OUTPATIENT
Start: 2018-05-21

## 2018-05-20 RX ORDER — ONDANSETRON 2 MG/ML
4 INJECTION INTRAMUSCULAR; INTRAVENOUS
Status: CANCELLED | OUTPATIENT
Start: 2018-05-20

## 2018-05-20 RX ORDER — NALOXONE HYDROCHLORIDE 0.4 MG/ML
0.4 INJECTION, SOLUTION INTRAMUSCULAR; INTRAVENOUS; SUBCUTANEOUS ONCE
Status: DISCONTINUED | OUTPATIENT
Start: 2018-05-20 | End: 2018-05-21 | Stop reason: HOSPADM

## 2018-05-20 RX ORDER — SWAB
1 SWAB, NON-MEDICATED MISCELLANEOUS DAILY
Status: DISCONTINUED | OUTPATIENT
Start: 2018-05-21 | End: 2018-05-22 | Stop reason: HOSPADM

## 2018-05-20 RX ORDER — FENTANYL/BUPIVACAINE/NS/PF 2-1250MCG
1-16 PREFILLED PUMP RESERVOIR EPIDURAL CONTINUOUS
Status: DISCONTINUED | OUTPATIENT
Start: 2018-05-20 | End: 2018-05-21 | Stop reason: HOSPADM

## 2018-05-20 RX ORDER — SODIUM CHLORIDE 0.9 % (FLUSH) 0.9 %
5-10 SYRINGE (ML) INJECTION AS NEEDED
Status: DISCONTINUED | OUTPATIENT
Start: 2018-05-20 | End: 2018-05-22 | Stop reason: HOSPADM

## 2018-05-20 RX ORDER — IBUPROFEN 800 MG/1
800 TABLET ORAL EVERY 8 HOURS
Status: DISCONTINUED | OUTPATIENT
Start: 2018-05-21 | End: 2018-05-22 | Stop reason: HOSPADM

## 2018-05-20 RX ORDER — OXYTOCIN IN 5 % DEXTROSE 30/500 ML
.5-2 PLASTIC BAG, INJECTION (ML) INTRAVENOUS
Status: DISCONTINUED | OUTPATIENT
Start: 2018-05-21 | End: 2018-05-22 | Stop reason: HOSPADM

## 2018-05-20 RX ORDER — HYDROCORTISONE ACETATE PRAMOXINE HCL 2.5; 1 G/100G; G/100G
CREAM TOPICAL AS NEEDED
Status: CANCELLED | OUTPATIENT
Start: 2018-05-20

## 2018-05-20 RX ORDER — NALOXONE HYDROCHLORIDE 0.4 MG/ML
0.4 INJECTION, SOLUTION INTRAMUSCULAR; INTRAVENOUS; SUBCUTANEOUS AS NEEDED
Status: DISCONTINUED | OUTPATIENT
Start: 2018-05-20 | End: 2018-05-22 | Stop reason: HOSPADM

## 2018-05-20 RX ORDER — NALOXONE HYDROCHLORIDE 0.4 MG/ML
0.4 INJECTION, SOLUTION INTRAMUSCULAR; INTRAVENOUS; SUBCUTANEOUS AS NEEDED
Status: DISCONTINUED | OUTPATIENT
Start: 2018-05-20 | End: 2018-05-21 | Stop reason: HOSPADM

## 2018-05-20 RX ORDER — DOCUSATE SODIUM 100 MG/1
100 CAPSULE, LIQUID FILLED ORAL
Status: DISCONTINUED | OUTPATIENT
Start: 2018-05-20 | End: 2018-05-22 | Stop reason: HOSPADM

## 2018-05-20 RX ORDER — SIMETHICONE 80 MG
80 TABLET,CHEWABLE ORAL
Status: CANCELLED | OUTPATIENT
Start: 2018-05-20

## 2018-05-20 RX ORDER — HYDROCORTISONE ACETATE PRAMOXINE HCL 2.5; 1 G/100G; G/100G
CREAM TOPICAL AS NEEDED
Status: DISCONTINUED | OUTPATIENT
Start: 2018-05-20 | End: 2018-05-22 | Stop reason: HOSPADM

## 2018-05-20 RX ORDER — SODIUM CHLORIDE, SODIUM LACTATE, POTASSIUM CHLORIDE, CALCIUM CHLORIDE 600; 310; 30; 20 MG/100ML; MG/100ML; MG/100ML; MG/100ML
125 INJECTION, SOLUTION INTRAVENOUS CONTINUOUS
Status: DISCONTINUED | OUTPATIENT
Start: 2018-05-20 | End: 2018-05-22 | Stop reason: HOSPADM

## 2018-05-20 RX ORDER — DOCUSATE SODIUM 100 MG/1
100 CAPSULE, LIQUID FILLED ORAL
Status: CANCELLED | OUTPATIENT
Start: 2018-05-20

## 2018-05-20 RX ORDER — EPHEDRINE SULFATE 50 MG/ML
10 INJECTION, SOLUTION INTRAVENOUS
Status: DISCONTINUED | OUTPATIENT
Start: 2018-05-20 | End: 2018-05-21 | Stop reason: HOSPADM

## 2018-05-20 RX ORDER — ONDANSETRON 2 MG/ML
4 INJECTION INTRAMUSCULAR; INTRAVENOUS
Status: DISCONTINUED | OUTPATIENT
Start: 2018-05-20 | End: 2018-05-21 | Stop reason: HOSPADM

## 2018-05-20 RX ORDER — NALOXONE HYDROCHLORIDE 0.4 MG/ML
0.4 INJECTION, SOLUTION INTRAMUSCULAR; INTRAVENOUS; SUBCUTANEOUS AS NEEDED
Status: CANCELLED | OUTPATIENT
Start: 2018-05-20

## 2018-05-20 RX ORDER — OXYCODONE AND ACETAMINOPHEN 5; 325 MG/1; MG/1
1 TABLET ORAL
Status: CANCELLED | OUTPATIENT
Start: 2018-05-20

## 2018-05-20 RX ORDER — OXYTOCIN IN 5 % DEXTROSE 30/500 ML
PLASTIC BAG, INJECTION (ML) INTRAVENOUS
Status: COMPLETED
Start: 2018-05-20 | End: 2018-05-20

## 2018-05-20 RX ORDER — OXYTOCIN/RINGER'S LACTATE 20/1000 ML
125-500 PLASTIC BAG, INJECTION (ML) INTRAVENOUS ONCE
Status: CANCELLED | OUTPATIENT
Start: 2018-05-21 | End: 2018-05-21

## 2018-05-20 RX ORDER — SODIUM CHLORIDE 0.9 % (FLUSH) 0.9 %
5-10 SYRINGE (ML) INJECTION EVERY 8 HOURS
Status: DISCONTINUED | OUTPATIENT
Start: 2018-05-20 | End: 2018-05-22 | Stop reason: HOSPADM

## 2018-05-20 RX ADMIN — PENICILLIN G POTASSIUM 2.5 MILLION UNITS: 20000000 POWDER, FOR SOLUTION INTRAVENOUS at 21:24

## 2018-05-20 RX ADMIN — Medication 12 ML/HR: at 21:24

## 2018-05-20 RX ADMIN — SODIUM CHLORIDE, SODIUM LACTATE, POTASSIUM CHLORIDE, AND CALCIUM CHLORIDE 500 ML: 600; 310; 30; 20 INJECTION, SOLUTION INTRAVENOUS at 17:36

## 2018-05-20 RX ADMIN — Medication 999 MILLI-UNITS/MIN: at 23:03

## 2018-05-20 RX ADMIN — SODIUM CHLORIDE, SODIUM LACTATE, POTASSIUM CHLORIDE, AND CALCIUM CHLORIDE 1000 ML: 600; 310; 30; 20 INJECTION, SOLUTION INTRAVENOUS at 20:48

## 2018-05-20 RX ADMIN — SODIUM CHLORIDE 5 MILLION UNITS: 900 INJECTION, SOLUTION INTRAVENOUS at 17:44

## 2018-05-20 NOTE — PROGRESS NOTES
1653:  The patient presents to labor and delivery complaining of contractions since 0800. She also has noted some scant vaginal bleeding with clear mucous. She reports good fetal movements today. The patient denies leaking of amniotic fluid. 1818:  Pt up to walk. 2036:  Report given to Pamela Casarez RN in [employer].

## 2018-05-20 NOTE — IP AVS SNAPSHOT
[facility] 
 
 
 [address] 
[phone] Patient: Pedro Wilson MRN: [id number] CHW:6/23/[birth year]22 About your hospitalization You were admitted on:  May 20, 2018 [facility] in the[facility] [address] You were discharged on:  May 22, 2018 Why you were hospitalized Your primary diagnosis was:  Not on File Your diagnoses also included:  Uterine Contractions During Pregnancy Follow-up Information Follow up With Details Comments Contact Info Sabine Porter MD Schedule an appointment as soon as possible for a visit on 7/3/2018 Call to make an appointment for your post partum check. [address] [place] [address] 
[phone] Your Scheduled Appointments Thursday May 24, 2018  2:30 PM EDT  
OB VISIT with Chuck Narvaez DO [facility] ([address])  
 [address] [place] [address]  
[phone] Discharge Orders None A check sam indicates which time of day the medication should be taken. My Medications START taking these medications Instructions Each Dose to Equal  
 Morning Noon Evening Bedtime  
 docusate sodium 100 mg capsule Commonly known as:  Lewis Navas Your last dose was: Your next dose is: Take 1 Cap by mouth daily as needed for Constipation. 100 mg  
    
   
   
   
  
 ibuprofen 800 mg tablet Commonly known as:  MOTRIN Your last dose was: Your next dose is: Take 1 Tab by mouth every eight (8) hours as needed for Pain. 800 mg CONTINUE taking these medications Instructions Each Dose to Equal  
 Morning Noon Evening Bedtime  
 prenatal vit-iron fumarate-fa 27 mg iron- 0.8 mg Tab tablet Your last dose was: Your next dose is: Take 1 Tab by mouth daily. 1 Tab Where to Get Your Medications Information on where to get these meds will be given to you by the nurse or doctor. ! Ask your nurse or doctor about these medications  
  docusate sodium 100 mg capsule  
 ibuprofen 800 mg tablet Discharge Instructions After Your Delivery (the Postpartum Period): Care Instructions Your Care Instructions Congratulations on the birth of your baby. Like pregnancy, the  period can be a time of excitement, fernanda, and exhaustion. You may look at your wondrous little baby and feel happy. You may also be overwhelmed by your new sleep hours and new responsibilities. At first, babies often sleep during the days and are awake at night. They do not have a pattern or routine. They may make sudden gasps, jerk themselves awake, or look like they have crossed eyes. These are all normal, and they may even make you smile. In these first weeks after delivery, try to take good care of yourself. It may take 4 to 6 weeks to feel like yourself again, and possibly longer if you had a  birth. You will likely feel very tired for several weeks. Your days will be full of ups and downs, but lots of fernanda as well. Follow-up care is a key part of your treatment and safety. Be sure to make and go to all appointments, and call your doctor if you are having problems. It's also a good idea to know your test results and keep a list of the medicines you take. How can you care for yourself at home? Take care of your body after delivery · Use pads instead of tampons for the bloody flow that may last as long as 2 weeks. · Ease cramps with ibuprofen (Advil, Motrin). · Ease soreness of hemorrhoids and the area between your vagina and rectum with ice compresses or witch hazel pads. · Ease constipation by drinking lots of fluid and eating high-fiber foods. Ask your doctor about over-the-counter stool softeners. · Cleanse yourself with a gentle squeeze of warm water from a bottle instead of wiping with toilet paper. · Take a sitz bath in warm water several times a day. · Wear a good nursing bra. Ease sore and swollen breasts with warm, wet washcloths. · If you are not breastfeeding, use ice rather than heat for breast soreness. · Your period may not start for several months if you are breastfeeding. You may bleed more, and longer at first, than you did before you got pregnant. · Wait until you are healed (about 4 to 6 weeks) before you have sexual intercourse. Your doctor will tell you when it is okay to have sex. · Try not to travel with your baby for 5 or 6 weeks. If you take a long car trip, make frequent stops to walk around and stretch. Avoid exhaustion · Rest every day. Try to nap when your baby naps. · Ask another adult to be with you for a few days after delivery. · Plan for  if you have other children. · Stay flexible so you can eat at odd hours and sleep when you need to. Both you and your baby are making new schedules. · Plan small trips to get out of the house. Change can make you feel less tired. · Ask for help with housework, cooking, and shopping. Remind yourself that your job is to care for your baby. Know about help for postpartum depression · \"Baby blues\" are common for the first 1 to 2 weeks after birth. You may cry or feel sad or irritable for no reason. · Rest whenever you can. Being tired makes it harder to handle your emotions. · Go for walks with your baby. · Talk to your partner, friends, and family about your feelings. · If your symptoms last for more than a few weeks, or if you feel very depressed, ask your doctor for help. · Postpartum depression can be treated. Support groups and counseling can help. Sometimes medicine can also help. Stay healthy · Eat healthy foods so you have more energy, make good breast milk, and lose extra baby pounds. · If you breastfeed, avoid alcohol and drugs. Stay smoke-free. If you quit during pregnancy, congratulations. · Start daily exercise after 4 to 6 weeks, but rest when you feel tired. · Learn exercises to tone your belly. Do Kegel exercises to regain strength in your pelvic muscles. You can do these exercises while you stand or sit. ¨ Squeeze the same muscles you would use to stop your urine. Your belly and thighs should not move. ¨ Hold the squeeze for 3 seconds, and then relax for 3 seconds. ¨ Start with 3 seconds. Then add 1 second each week until you are able to squeeze for 10 seconds. ¨ Repeat the exercise 10 to 15 times for each session. Do three or more sessions each day. · Find a class for new mothers and new babies that has an exercise time. · If you had a  birth, give yourself a bit more time before you exercise, and be careful. When should you call for help? Call 911 anytime you think you may need emergency care. For example, call if: 
? · You passed out (lost consciousness). ?Call your doctor now or seek immediate medical care if: 
? · You have severe vaginal bleeding. This means you are passing blood clots and soaking through a pad each hour for 2 or more hours. ? · You are dizzy or lightheaded, or you feel like you may faint. ? · You have a fever. ? · You have new belly pain, or your pain gets worse. ? Watch closely for changes in your health, and be sure to contact your doctor if: 
? · Your vaginal bleeding seems to be getting heavier. ? · You have new or worse vaginal discharge. ? · You feel sad, anxious, or hopeless for more than a few days. ? · You do not get better as expected. Where can you learn more? Go to http://abelardo-james.info/. Enter A461 in the search box to learn more about \"After Your Delivery (the Postpartum Period): Care Instructions. \" Current as of: 2017 Content Version: 11.4 © 0881-4144 Healthwise, Incorporated. Care instructions adapted under license by [employer] (which disclaims liability or warranty for this information). If you have questions about a medical condition or this instruction, always ask your healthcare professional. [address] any warranty or liability for your use of this information. Después del parto (período de posparto): Después de la consulta [After Your Delivery (the Postpartum Period): After Your Visit] Instrucciones de cuidado Felicidades por el nacimiento de saenz bebé. Al igual que el [place], el tiempo con el recién nacido puede ser un momento de [place], Luis Angel Jimmy y agotamiento. Es posible que se sienta emanuel al mirar la michelle de saenz pequeño bebé. También podría sentirse abrumada por saenz nuevo ritmo de sueño y las nuevas responsabilidades. Al principio, los bebés suelen dormir ayaz el día y permanecen despiertos ayaz la noche. No tienen ningún patrón ni rutina. Podrían pineda gritos ahogados, sacudirse y despertarse, o parecer rajat si tuvieran los ojos cruzados (bizcos). Todo esto es normal, e incluso la pueden hacer sonreír. Ayaz las primeras semanas siguientes al parto, trate de cuidarse willem. Podría tardar de 4 a 6 semanas en volver a sentirse usted misma, y posiblemente más tiempo si le musa hecho lennox cesárea. Es probable que se sienta muy fatigada ayaz varias semanas. Airam días estarán llenos de Yany, corrine también de mucha alegría. La atención de seguimiento es lennox parte clave de saenz tratamiento y seguridad. Asegúrese de hacer y acudir a todas las citas, y llame a saenz médico si está teniendo problemas. También es lennox buena idea saber los resultados de los exámenes y mantener lennox lista de los medicamentos que roxana. Cómo puede cuidarse en el hogar? Cuide saenz cuerpo después del parto · Utilice toallas sanitarias en vez de tampones para las pérdidas de fabrice que podrían durar hasta 2 semanas. · Alivie los cólicos con ibuprofeno (Advil, Motrin). · Alivie el dolor de las hemorroides y la cirilo entre la vagina y el recto con compresas de hielo o de infusión de hamamelis Rosana Hoar (\"witch hazel\"). · Alivie el estreñimiento bebiendo abundante líquido y comiendo alimentos ricos en fibra. Pregúntele a saenz médico acerca de los ablandadores de heces de [employer]. · Límpiese con un chorrito suave de agua tibia de lennox botella en vez de hacerlo con papel higiénico. 
· [place] un baño de asiento en agua tibia varias veces al día. · Use un buen sostén de lactancia. Alivie el dolor y la hinchazón de los senos con toallitas de aseo húmedas tibias. · Si no está amamantando, utilice hielo en lugar de calor para aliviar el dolor de los senos. · Si está amamantando, saenz período menstrual podría no comenzar hasta después de varios meses. Es posible que [place], y más tiempo al principio, de rajat lo hacía antes del Rosi [place]. · Espere hasta que haya sanado (de 4 a 6 semanas) antes de volver a tener relaciones sexuales. Saenz médico le dirá cuándo puede tener relaciones sexuales. · Trate de no viajar con el bebé ayaz las primeras 5 o 6 semanas. Si hace un viaje trung en automóvil, chan paradas frecuentes para caminar y estirarse. Evite el agotamiento · Descanse todos los días. Trate de dormir la siesta cuando saenz bebé también lo hace. · Pídale a otro adulto que la acompañe por unos bernie después del [place]. · Planifique el cuidado de los niños si tiene otros hijos. · Sea flexible para que pueda comer a horas fuera de lo común y dormir cuando lo necesite. Tanto usted rajat saenz bebé están creando horarios nuevos. · Planee pequeñas salidas para estar afuera de la casa. El cambio podría hacer que se sienta menos fatigada. · Pida ayuda para cocinar y [address] hogar y las compras. Recuerde que saenz principal tarea consiste en cuidar a saenz bebé. Conozca la ayuda que puede recibir en shannon de tener depresión posparto · La depresión posparto es común ayaz las primeras 1 a 2 semanas siguientes al nacimiento. Podría llorar o sentirse kenneth o irritable sin razón alguna. · Descanse cada vez que pueda hacerlo. Estar fatigada le dificulta manejar herber emociones. · Salga a caminar con saenz bebé. · Hable con saenz kvng, herber amigos y herber familiares acerca de herber sentimientos. · Si herber síntomas duggan más de unas pocas semanas, o si se siente muy deprimida, pídale ayuda a saenz médico. 
· La depresión posparto puede tratarse. Los grupos de apoyo y la asesoría psicológica pueden ser de [place]. A veces, los medicamentos también pueden ayudar. Manténgase saludable · Coma alimentos sanos para tener más energía, producir lennox buena [employer] materna y adelgazar las libras adicionales que engordó con el bebé. · Si amamanta, evite el alcohol y las drogas. No fume. Si dejó de fumar ayaz el embarazo, felicitaciones. · Inicie ejercicios diarios después de 4 a 6 semanas, corrine descanse cuando se sienta fatigada. · Aprenda ejercicios para tonificar el abdomen. Duc los ejercicios de Kegel para recuperar la fuerza de los músculos pélvicos. Puede hacer los ejercicios de Kegel mientras está de pie o sentada. ¨ Apriete los mismos músculos que usted usaría para detener la [place]. El vientre y las nalgas (glúteos) no deben moverse. ¨ Manténgalos apretados ayaz 3 segundos, luego relájelos otros 3 segundos. ¨ Repita el ejercicio entre 10 y 13 veces cada sesión. Duc mamie o más sesiones cada día. · Busque lennox clase para nuevas madres y recién nacidos que tenga un tiempo de ejercicio. · Si le musa hecho lennox cesárea, dese un poco más de tiempo antes de hacer ejercicio, y tenga cuidado. Cuándo debe pedir ayuda? Llame al 911 en cualquier momento que considere que necesita atención de emergencia. Por ejemplo, llame si: 
· Se desmayó (perdió el conocimiento). Llame a saenz médico ahora mismo o busque atención médica inmediata si: · Tiene sangrado vaginal intenso. [place] significa que está expulsando coágulos sanguíneos y empapando lennox toalla sanitaria cada hora por 2 horas o más. · Está mareada o aturdida, o siente rajat que se puede [address]. · Tiene fiebre. · Tiene dolor abdominal nuevo o que empeora. Preste especial atención a los cambios en saenz bryanna y asegúrese de comunicarse con saenz médico si: 
· Saenz sangrado vaginal parece volverse más intenso. · Tiene flujo vaginal nuevo o que empeora. · Se siente kenneth, ansiosa o sin esperanzas ayaz más de unos pocos días. · No mejora rajat se esperaba. Dónde puede encontrar más información en inglés? Tram End a DealExplorer.be [facility] H074 en la búsqueda para aprender [employer] de \"Después del parto (período de posparto): Después de la consulta. \"  
© 3268-4392 Healthwise, Incorporated. Instrucciones de cuidado adaptadas bajo licencia por [facility] (which disclaims liability or warranty for this information). Estas instrucciones de cuidado son para usarlas con saenz profesional clínico registrado. Si tiene preguntas acerca de lennox afección médica o de estas instrucciones, pregunte siempre a saenz profesional de [employer]. Healthwise, Incorporated niega cualquier garantía o responsabilidad por saenz uso de esta información. Versión del contenido: [id number]; Última revisión: 20 marzo, 2012 UMicIt Announcement We are excited to announce that we are making your provider's discharge notes available to you in UMicIt. You will see these notes when they are completed and signed by the physician that discharged you from your recent hospital stay. If you have any questions or concerns about any information you see in [employer]PAC Technologieshart, please call the Health Information Department where you were seen or reach out to your Primary Care Provider for more information about your plan of care. Introducing [address] [facility] introduce portal paciente MyChart . Ahora se puede acceder a partes de saenz expediente médico, enviar por correo electrónico la oficina de saenz médico y solicitar renovaciones de medicamentos en línea. En saenz navegador de Internet , [employer] a [email or web address] Chan clic en el usuario por Wilbert Garber? Antony Ligas clic aquí en la sesión Elvira Rede. Verá la página de registro [place]. Ingrese saenz código de [facility] ghulam y rajat aparece a continuación. Usted no tendrá que [employer] código después de saeed completado el proceso de registro . Si usted no se inscribe antes de la fecha de caducidad , debe solicitar un nuevo código. · MyChart Código de acceso : [employer] Expires: 5/28/2018  6:09 PM 
 
Ingresa los últimos cuatro dígitos de saenz Número de Seguro Social ( xxxx ) y fecha de nacimiento ( dd / mm / aaaa ) rajat se indica y chan clic en Enviar. Usted será llevado a la siguiente página de registro . Crear un ID MyChart . Esta será saenz ID de inicio de sesión de MyChart y no puede ser [place] , por lo que pensar en lennox que es Deneise Pea y fácil de recordar . Crear lennox contraseña MyChart . Usted puede cambiar saenz contraseña en cualquier momento . Ingrese saenz Password Reset de preguntas y [place] . [facility] [place] se puede utilizar en un momento posterior si usted olvida saenz contraseña. Introduzca saenz dirección de correo electrónico . Crystal Maidens recibirá lennox notificación por correo electrónico cuando la nueva información está disponible en MyChart . Felicie Abler clic en Registrarse. Magdalen Ing claritza y descargar porciones de saenz expediente médico. 
Chan tong en el enlace de descarga del menú Resumen para descargar lennox copia portátil de saenz información médica . Si tiene [employer] , por favor visite la sección de preguntas frecuentes del sitio web MyChart . Recuerde, MyChart NO es que se utilizará para las necesidades urgentes.  Para emergencias médicas , lisha al 911 . Ahora disponible en saenz iPhone y Android ! Introducing Cosme Wright As a [facility] patient, I wanted to make you aware of our electronic visit tool called Cosme Wright. [facility] 24/7 allows you to connect within minutes with a medical provider 24 hours a day, seven days a week via a mobile device or tablet or logging into a secure website from your computer. You can access Cosme Wright from anywhere in the [place]. A virtual visit might be right for you when you have a simple condition and feel like you just dont want to get out of bed, or cant get away from work for an appointment, when your regular [facility] provider is not available (evenings, weekends or holidays), or when youre out of town and need minor care. Electronic visits cost only $49 and if the [email or web address] 24/7 provider determines a prescription is needed to treat your condition, one can be electronically transmitted to a nearby pharmacy*. Please take a moment to enroll today if you have not already done so. The enrollment process is free and takes just a few minutes. To enroll, please download the [facility] 24/7 prince to your tablet or phone, or visit [email or web address] to enroll on your computer. And, as an [address] [place] [address] patient with a [facility] account, the results of your visits will be scanned into your electronic medical record and your primary care provider will be able to view the scanned results. We urge you to continue to see your regular [facility] provider for your ongoing medical care. And while your primary care provider may not be the one available when you seek a Cosme Wright virtual visit, the peace of mind you get from getting a real diagnosis real time can be priceless. For more information on Cosme Wright, view our Frequently Asked Questions (FAQs) at [email or web address].  
 
Sincerely, 
 
Marcos Gill MD 
 Chief Medical Officer Viral Frias *:  certain medications cannot be prescribed via Cosme Wright Providers Seen During Your Hospitalization Provider Specialty Primary office phone Dennis Arauz DO Family Practice [phone] Your Primary Care Physician (PCP) Primary Care Physician Office Phone Office Fax Hermila Candy [phone] You are allergic to the following No active allergies Recent Documentation Height Weight Breastfeeding? BMI OB Status Smoking Status 1.575 m 97.5 kg Yes 39.32 kg/m2 Recent pregnancy Never Smoker Emergency Contacts Name Discharge Info Relation Home Work Mobile April Hawthorne DISCHARGE CAREGIVER [3] Friend [5]   [phone] Patient Belongings The following personal items are in your possession at time of discharge: 
  Dental Appliances: None  Visual Aid: None      Home Medications: None   Jewelry: None  Clothing: At bedside, Footwear, Pants, Shirt, Undergarments    Other Valuables: None  Personal Items Sent to Safe:  (none) Please provide this summary of care documentation to your next provider. Signatures-by signing, you are acknowledging that this After Visit Summary has been reviewed with you and you have received a copy. Patient Signature:  ____________________________________________________________ Date:  ____________________________________________________________  
  
Arna [place] Provider Signature:  ____________________________________________________________ Date:  ____________________________________________________________  
  
  
   
  
[address] 
[phone] Patient: Desean Givens MRN: [id number] WIA:8/97/3156 Sobre burch hospitalización Le admitieron el:  May 20, 2018 Burch tratamiento más reciente fue el:  [facility] 3 MOTHER INFANT Le dieron de paris el:  May 22, 2018 Por qué le ingresaron Burch diagnosis primaria es:  No está archivado/a Burch diagnosis también incluye:  Uterine Contractions During Pregnancy Follow-up Information Follow up With Details Comments Contact Info Lee Ann Mcnair MD Schedule an appointment as soon as possible for a visit on 7/3/2018 Call to make an appointment for your post partum check. [address] [place] [address] [address] [facility] 
[phone] Your Scheduled Appointments Thursday May 24, 2018  2:30 PM EDT  
OB VISIT with Oksana Briones DO [facility] ([facility])  
 [address] [facility] 100[address] [facility]  
[phone] Discharge Orders [email or web address] A check sam indicates which time of day the medication should be taken. My Medications EMPIECE a [employer] Instructions Each Dose to Equal  
 Morning Noon Evening Bedtime  
 docusate sodium 100 mg capsule También conocido rajat:  Elvi Augustin Your last dose was: Your next dose is: Take 1 Cap by mouth daily as needed for Constipation. 100 mg  
    
   
   
   
  
 ibuprofen 800 mg tablet También conocido rajat:  MOTRIN Your last dose was: Your next dose is: Take 1 Tab by mouth every eight (8) hours as needed for Pain. 800 mg SIGA tomando estos medicamentos Instructions Each Dose to Equal  
 Morning Noon Evening Bedtime  
 prenatal vit-iron fumarate-fa 27 mg iron- 0.8 mg Tab tablet Your last dose was: Your next dose is: Take 1 Tab by mouth daily. 1 Tab Dónde puede recoger herber medicamentos Information on where to get these meds will be given to you by the nurse or doctor. ! Pregunte a burch médico o enfermero/a sobre estos medicamentos  
  docusate sodium 100 mg capsule  
 ibuprofen 800 mg tablet Instrucciones a pineda de paris After Your Delivery (the Postpartum Period): Care Instructions Your Care Instructions Congratulations on the birth of your baby. Like pregnancy, the  period can be a time of excitement, fernanda, and exhaustion. You may look at your wondrous little baby and feel happy. You may also be overwhelmed by your new sleep hours and new responsibilities. At first, babies often sleep during the days and are awake at night. They do not have a pattern or routine. They may make sudden gasps, jerk themselves awake, or look like they have crossed eyes. These are all normal, and they may even make you smile. In these first weeks after delivery, try to take good care of yourself. It may take 4 to 6 weeks to feel like yourself again, and possibly longer if you had a  birth. You will likely feel very tired for several weeks. Your days will be full of ups and downs, but lots of fernanda as well. Follow-up care is a key part of your treatment and safety. Be sure to make and go to all appointments, and call your doctor if you are having problems. It's also a good idea to know your test results and keep a list of the medicines you take. How can you care for yourself at home? Take care of your body after delivery · Use pads instead of tampons for the bloody flow that may last as long as 2 weeks. · Ease cramps with ibuprofen (Advil, Motrin). · Ease soreness of hemorrhoids and the area between your vagina and rectum with ice compresses or witch hazel pads. · Ease constipation by drinking lots of fluid and eating high-fiber foods. Ask your doctor about over-the-counter stool softeners. · Cleanse yourself with a gentle squeeze of warm water from a bottle instead of wiping with toilet paper. · Take a sitz bath in warm water several times a day. · Wear a good nursing bra. Ease sore and swollen breasts with warm, wet washcloths. · If you are not breastfeeding, use ice rather than heat for breast soreness. · Your period may not start for several months if you are breastfeeding. You may bleed more, and longer at first, than you did before you got pregnant. · Wait until you are healed (about 4 to 6 weeks) before you have sexual intercourse. Your doctor will tell you when it is okay to have sex. · Try not to travel with your baby for 5 or 6 weeks. If you take a long car trip, make frequent stops to walk around and stretch. Avoid exhaustion · Rest every day. Try to nap when your baby naps. · Ask another adult to be with you for a few days after delivery. · Plan for  if you have other children. · Stay flexible so you can eat at odd hours and sleep when you need to. Both you and your baby are making new schedules. · Plan small trips to get out of the house. Change can make you feel less tired. · Ask for help with housework, cooking, and shopping. Remind yourself that your job is to care for your baby. Know about help for postpartum depression · \"Baby blues\" are common for the first 1 to 2 weeks after birth. You may cry or feel sad or irritable for no reason. · Rest whenever you can. Being tired makes it harder to handle your emotions. · Go for walks with your baby. · Talk to your partner, friends, and family about your feelings. · If your symptoms last for more than a few weeks, or if you feel very depressed, ask your doctor for help. · Postpartum depression can be treated. Support groups and counseling can help. Sometimes medicine can also help. Stay healthy · Eat healthy foods so you have more energy, make good breast milk, and lose extra baby pounds. · If you breastfeed, avoid alcohol and drugs. Stay smoke-free. If you quit during pregnancy, congratulations. · Start daily exercise after 4 to 6 weeks, but rest when you feel tired. · Learn exercises to tone your belly.  Do Kegel exercises to regain strength in your pelvic muscles. You can do these exercises while you stand or sit. ¨ Squeeze the same muscles you would use to stop your urine. Your belly and thighs should not move. ¨ Hold the squeeze for 3 seconds, and then relax for 3 seconds. ¨ Start with 3 seconds. Then add 1 second each week until you are able to squeeze for 10 seconds. ¨ Repeat the exercise 10 to 15 times for each session. Do three or more sessions each day. · Find a class for new mothers and new babies that has an exercise time. · If you had a  birth, give yourself a bit more time before you exercise, and be careful. When should you call for help? Call 911 anytime you think you may need emergency care. For example, call if: 
? · You passed out (lost consciousness). ?Call your doctor now or seek immediate medical care if: 
? · You have severe vaginal bleeding. This means you are passing blood clots and soaking through a pad each hour for 2 or more hours. ? · You are dizzy or lightheaded, or you feel like you may faint. ? · You have a fever. ? · You have new belly pain, or your pain gets worse. ? Watch closely for changes in your health, and be sure to contact your doctor if: 
? · Your vaginal bleeding seems to be getting heavier. ? · You have new or worse vaginal discharge. ? · You feel sad, anxious, or hopeless for more than a few days. ? · You do not get better as expected. Where can you learn more? Go to http://abelardo-james.info/. Enter A461 in the search box to learn more about \"After Your Delivery (the Postpartum Period): Care Instructions. \" Current as of: 2017 Content Version: 11.4 © 3384-9814 Page[employer]. Care instructions adapted under license by [employer] (which disclaims liability or warranty for this information).  If you have questions about a medical condition or this instruction, always ask your healthcare professional. Caitie Abraham, Incorporated disclaims any warranty or liability for your use of this information. Después del parto (período de posparto): Después de la consulta [After Your Delivery (the Postpartum Period): After Your Visit] Instrucciones de cuidado Felicidades por el nacimiento de saenz bebé. Al igual que el Kira Pock, el tiempo con el recién nacido puede ser un momento de [place], [place] y agotamiento. Es posible que se sienta emanuel al mirar la michelle de saenz pequeño bebé. También podría sentirse abrumada por saenz nuevo ritmo de sueño y las nuevas responsabilidades. Al principio, los bebés suelen dormir ayaz el día y permanecen despiertos ayaz la noche. No tienen ningún patrón ni rutina. Podrían pineda gritos ahogados, sacudirse y despertarse, o parecer rajat si tuvieran los ojos cruzados (bizcos). Todo esto es normal, e incluso la pueden hacer sonreír. Ayaz las primeras semanas siguientes al parto, trate de cuidarse willem. Podría tardar de 4 a 6 semanas en volver a sentirse usted misma, y posiblemente más tiempo si le musa hecho lennox cesárea. Es probable que se sienta muy fatigada ayaz varias semanas. Airam días estarán llenos de Yany, corrine también de mucha alegría. La atención de seguimiento es lennox parte clave de saenz tratamiento y seguridad. Asegúrese de hacer y acudir a todas las citas, y llame a saenz médico si está teniendo problemas. También es lennox buena idea saber los resultados de los exámenes y mantener lennox lista de los medicamentos que roxana. Cómo puede cuidarse en el hogar? Cuide saenz cuerpo después del parto · Utilice toallas sanitarias en vez de tampones para las pérdidas de fabrice que podrían durar hasta 2 semanas. · Alivie los cólicos con ibuprofeno (Advil, Motrin). · Alivie el dolor de las hemorroides y la cirilo entre la vagina y el recto con compresas de hielo o de infusión de hamamelis Aishwarya Osborne (\"witch hazel\").  
· Alivie el estreñimiento bebiendo abundante líquido y comiendo alimentos ricos en fibra. Pregúntele a saenz médico acerca de los ablandadores de heces de [place]. · Límpiese con un chorrito suave de agua tibia de lennox botella en vez de hacerlo con papel higiénico. 
· [place] un baño de asiento en agua tibia varias veces al día. · Use un buen sostén de lactancia. Alivie el dolor y la hinchazón de los senos con toallitas de aseo húmedas tibias. · Si no está amamantando, utilice hielo en lugar de calor para aliviar el dolor de los senos. · Si está amamantando, saenz período menstrual podría no comenzar hasta después de varios meses. Es posible que [place], y más tiempo al principio, de rajat lo hacía antes del Connor Wilkes. · Espere hasta que haya sanado (de 4 a 6 semanas) antes de volver a tener relaciones sexuales. Saenz médico le dirá cuándo puede tener relaciones sexuales. · Trate de no viajar con el bebé ayaz las primeras 5 o 6 semanas. Si hace un viaje trung en automóvil, chan paradas frecuentes para caminar y estirarse. Evite el agotamiento · Descanse todos los días. Trate de dormir la siesta cuando saenz bebé también lo hace. · Pídale a otro adulto que la acompañe por unos bernie después del [place]. · Planifique el cuidado de los niños si tiene otros hijos. · Sea flexible para que pueda comer a horas fuera de lo común y dormir cuando lo necesite. Tanto usted rajat saenz bebé están creando horarios nuevos. · Planee pequeñas salidas para estar afuera de la casa. El cambio podría hacer que se sienta menos fatigada. · Pida ayuda para cocinar y [address] hogar y las compras. Recuerde que saenz principal tarea consiste en cuidar a saenz bebé. Conozca la ayuda que puede recibir en shannon de tener depresión posparto · La depresión posparto es común ayaz las primeras 1 a 2 semanas siguientes al nacimiento. Podría llorar o sentirse kenneth o irritable sin razón alguna. · Descanse cada vez que pueda hacerlo. Estar fatigada le dificulta manejar herber emociones. · Salga a caminar con saenz bebé. · Hable con saenz kvng, herber amigos y herber familiares acerca de herber sentimientos. · Si herber síntomas duggan más de unas pocas semanas, o si se siente muy deprimida, pídale ayuda a saenz médico. 
· La depresión posparto puede tratarse. Los grupos de apoyo y la asesoría psicológica pueden ser de [facility]. A veces, los medicamentos también pueden ayudar. Manténgase saludable · Coma alimentos sanos para tener más energía, producir lennox buena [place] materna y adelgazar las libras adicionales que engordó con el bebé. · Si amamanta, evite el alcohol y las drogas. No fume. Si dejó de fumar ayaz el embarazo, felicitaciones. · Inicie ejercicios diarios después de 4 a 6 semanas, corrine descanse cuando se sienta fatigada. · Aprenda ejercicios para tonificar el abdomen. Duc los ejercicios de Kegel para recuperar la fuerza de los músculos pélvicos. Puede hacer los ejercicios de Kegel mientras está de pie o sentada. ¨ Apriete los mismos músculos que usted usaría para detener la [place]. El vientre y las nalgas (glúteos) no deben moverse. ¨ Manténgalos apretados ayaz 3 segundos, luego relájelos otros 3 segundos. ¨ Repita el ejercicio entre 10 y 13 veces cada sesión. Duc mamie o más sesiones cada día. · Busque lennox clase para nuevas madres y recién nacidos que tenga un tiempo de ejercicio. · Si le musa hecho lennox cesárea, dese un poco más de tiempo antes de hacer ejercicio, y tenga cuidado. Cuándo debe pedir ayuda? Llame al 911 en cualquier momento que considere que necesita atención de emergencia. Por ejemplo, llame si: 
· Se desmayó (perdió el conocimiento). Llame a saenz médico ahora mismo o busque atención médica inmediata si: · Tiene sangrado vaginal intenso. [place] significa que está expulsando coágulos sanguíneos y empapando lennox toalla sanitaria cada hora por 2 horas o más. · Está mareada o aturdida, o siente rajat que se puede [address] [facility] [place] [facility]. · Tiene fiebre. · Tiene dolor abdominal nuevo o que empeora. Preste especial atención a los cambios en bruch bryanna y asegúrese de comunicarse con burch médico si: 
· Burch sangrado vaginal parece volverse más intenso. · Tiene flujo vaginal nuevo o que empeora. · Se siente kenneth, ansiosa o sin esperanzas ayaz más de unos pocos días. · No mejora rajat se esperaba. Dónde puede encontrar más información en inglés? Carolin Marker a DealExplorer.be Anel Pena Z355 en la búsqueda para aprender Jovita Lobato de \"Después del parto (período de posparto): Después de la consulta. \"  
© 9580-9270 Healthwise, Incorporated. Instrucciones de cuidado adaptadas bajo licencia por Denei Chiles (which disclaims liability or warranty for this information). Estas instrucciones de cuidado son para usarlas con burch profesional clínico registrado. Si tiene preguntas acerca de lennox afección médica o de estas instrucciones, pregunte siempre a burch profesional de [employer]. Healthwise, Incorporated niega cualquier garantía o responsabilidad por burch uso de esta información. Versión del contenido: [id number]; Última revisión: 20 marzo, 2012 [employer] Announcement We are excited to announce that we are making your provider's discharge notes available to you in [employer]. You will see these notes when they are completed and signed by the physician that discharged you from your recent hospital stay. If you have any questions or concerns about any information you see in [employer], please call the Health Information Department where you were seen or reach out to your Primary Care Provider for more information about your plan of care. Introducing [facility] SERVICES! [facility] introduce portal paciente [employer] . Ahora se puede acceder a partes de burch expediente médico, enviar por correo electrónico la oficina de burch médico y solicitar renovaciones de medicamentos en línea. En burch navegador de Internet , Benedict herring [email or web address]. [email or web address] Chan clic en el usuario por Maranda Phelan? Ant Lennox clic aquí en la sesión Fahad Lint. Verá la página de registro [place]. Ingrese saenz código de [employer] ghulam y rajat aparece a continuación. Usted no tendrá que [employer] código después de saeed completado el proceso de registro . Si usted no se inscribe antes de la fecha de caducidad , debe solicitar un nuevo código. · MyChart Código de acceso : [employer] Expires: 5/28/2018  6:09 PM 
 
Ingresa los últimos cuatro dígitos de saenz Número de Seguro Social ( xxxx ) y fecha de nacimiento ( dd / mm / aaaa ) rajat se indica y chan clic en Enviar. Usted será llevado a la siguiente página de registro . Crear un ID MyChart . Esta será saenz ID de inicio de sesión de MyChart y no puede ser [place] , por lo que pensar en lennox que es [place] y fácil de recordar . Crear lennox contraseña MyChart . Usted puede cambiar saenz contraseña en cualquier momento . Ingrese saenz Password Reset de preguntas y [place] . [place] se puede utilizar en un momento posterior si usted olvida saenz contraseña. Introduzca saenz dirección de correo electrónico . Elizabeth Knows recibirá lennox notificación por correo electrónico cuando la nueva información está disponible en MyChart . Gisellis Shen clic en Registrarse. Ericka Fuelling claritza y descargar porciones de saenz expediente médico. 
Chan clic en el enlace de descarga del menú Resumen para descargar lennox copia portátil de saenz información médica . Si tiene [employer] , por favor visite la sección de preguntas frecuentes del sitio web MyChart . Recuerde, MyChart NO es que se utilizará para las necesidades urgentes. Para emergencias médicas , llame al 911 . Ahora disponible en saenz iPhone y Android ! Introducing Cosme Wright As a [employer] patient, I wanted to make you aware of our electronic visit tool called Cosme Wright. [employer] 24/7 allows you to connect within minutes with a medical provider 24 hours a day, seven days a week via a mobile device or tablet or logging into a secure website from your computer. You can access [employer] from anywhere in the [place]. A virtual visit might be right for you when you have a simple condition and feel like you just dont want to get out of bed, or cant get away from work for an appointment, when your regular [facility] provider is not available (evenings, weekends or holidays), or when youre out of town and need minor care. Electronic visits cost only $49 and if the [facility] [employer] provider determines a prescription is needed to treat your condition, one can be electronically transmitted to a nearby pharmacy*. Please take a moment to enroll today if you have not already done so. The enrollment process is free and takes just a few minutes. To enroll, please download the TapSurge prince to your tablet or phone, or visit [email or web address] to enroll on your computer. And, as an [address] [place] [address] patient with a [employer]Logic account, the results of your visits will be scanned into your electronic medical record and your primary care provider will be able to view the scanned results. We urge you to continue to see your regular [facility] provider for your ongoing medical care. And while your primary care provider may not be the one available when you seek a Cosme Wright virtual visit, the peace of mind you get from getting a real diagnosis real time can be priceless. For more information on Cosme HomeZadajakyfin, view our Frequently Asked Questions (FAQs) at [email or web address]. Sincerely, 
 
Faustina Willis MD 
Chief Medical Officer [employer] *:  certain medications cannot be prescribed via Cosme HomeZadabianca Providers Seen During Your Hospitalization Personal Médico Especialidad Teléfono principal de la oficina Vamsi Plummer, DO Family Practice [phone] Burch médico de atención primaria (PCP ) Primary Care Physician Office Phone Office Fax Kathe Jimenez [phone] Tiene alergias a lo siguiente No tiene alergias Documentación recientes Height Weight Está amamantando? BMI [facility]) Estado obstétrico Estatus de tabaquísmo 1.575 m 97.5 kg Yes 39.32 kg/m2 Recent pregnancy Never Smoker Emergency Contacts Name Discharge Info Relation Home Work Mobile April Hawthorne DISCHARGE CAREGIVER [3] Friend [5]   [phone] Patient Belongings The following personal items are in your possession at time of discharge: 
  Dental Appliances: None  Visual Aid: None      Home Medications: None   Jewelry: None  Clothing: At bedside, Footwear, Pants, Shirt, Undergarments    Other Valuables: None  Personal Items Sent to Safe:  (none) Please provide this summary of care documentation to your next provider. Signatures-by signing, you are acknowledging that this After Visit Summary has been reviewed with you and you have received a copy. Patient Signature:  ____________________________________________________________ Date:  ____________________________________________________________  
  
Jesica [facility] Provider Signature:  ____________________________________________________________ Date:  ____________________________________________________________

## 2018-05-20 NOTE — IP AVS SNAPSHOT
Summary of Care Report The Summary of Care report has been created to help improve care coordination. Users with access to [employer] or [address] (Web-based application) may access additional patient information including the Discharge Summary. If you are not currently a [address] user and need more information, please call the number listed below in the [place] section and ask to be connected with Medical Records. Facility Information Name Address Phone [address] [place] [phone] Patient Information Patient Name Sex  Nikita eDe ([id number]) Female 1990 Discharge Information Admitting Provider Service Area Unit Heavenly Gonzalez MD / [phone] [facility] 3 Mother Infant / [phone] Discharge Provider Discharge Date/Time Discharge Disposition Destination (none) 2018 Midday (Pending) (none) (none) Patient Language Language German [40] Hospital Problems as of 2018  Reviewed: 2018  2:49 PM by Dino Freeman DO Class Noted - Resolved Last Modified POA Active Problems Uterine contractions during pregnancy  2018 - Present 2018 by Heavenly Gonzalez MD Unknown Entered by Heavenly Gonzalez MD  
  
Non-Hospital Problems as of 2018  Reviewed: 2018  2:49 PM by Dino Freeman DO Class Noted - Resolved Last Modified Active Problems    labor in third trimester with  delivery  2017 - Present 2017 by Dk Burroughs MD  
  Entered by Dk Burroughs MD  
  Multiparity  2017 - Present 2017 by Dk Burroughs MD  
  Entered by Dk Burroughs MD  
  Obesity (BMI 35.0-39.9 without comorbidity)  2017 - Present 2017 by Dk Burroughs MD  
 Entered by Geo Colon MD  
  History of  delivery  2017 - Present 2017 by Geo Colon MD  
  Entered by Geo Colon MD  
  Hx of triplet pregnancy in prior pregnancy  2017 - Present 2017 by Geo Colon MD  
  Entered by Geo Colon MD  
  S/P D&C (status post dilation and curettage)  2017 - Present 2017 by Geo Colon MD  
  Entered by Geo Colon MD  
  
You are allergic to the following No active allergies Current Discharge Medication List  
  
START taking these medications Dose & Instructions Dispensing Information Comments  
 docusate sodium 100 mg capsule Commonly known as:  Bjorn Degroot Dose:  100 mg Take 1 Cap by mouth daily as needed for Constipation. Quantity:  30 Cap Refills:  0  
   
 ibuprofen 800 mg tablet Commonly known as:  MOTRIN Dose:  800 mg Take 1 Tab by mouth every eight (8) hours as needed for Pain. Quantity:  20 Tab Refills:  0 CONTINUE these medications which have NOT CHANGED Dose & Instructions Dispensing Information Comments  
 prenatal vit-iron fumarate-fa 27 mg iron- 0.8 mg Tab tablet Dose:  1 Tab Take 1 Tab by mouth daily. Quantity:  30 Tab Refills:  9 Current Immunizations Name Date Influenza Vaccine (Quad) PF 2017 Tdap 2018 Surgery Information ID Date/Time Status Primary Surgeon All Procedures Location [id number] 2018 Complete   [facility] - DO NOT SCHEDULE Follow-up Information Follow up With Details Comments Contact Info Geo Colon MD Schedule an appointment as soon as possible for a visit on 7/3/2018 Call to make an appointment for your post partum check. 7 [facility] [address] [facility] 
[phone] Discharge Instructions After Your Delivery (the Postpartum Period): Care Instructions Your Care Instructions Congratulations on the birth of your baby. Like pregnancy, the  period can be a time of excitement, fernanda, and exhaustion. You may look at your wondrous little baby and feel happy. You may also be overwhelmed by your new sleep hours and new responsibilities. At first, babies often sleep during the days and are awake at night. They do not have a pattern or routine. They may make sudden gasps, jerk themselves awake, or look like they have crossed eyes. These are all normal, and they may even make you smile. In these first weeks after delivery, try to take good care of yourself. It may take 4 to 6 weeks to feel like yourself again, and possibly longer if you had a  birth. You will likely feel very tired for several weeks. Your days will be full of ups and downs, but lots of fernanda as well. Follow-up care is a key part of your treatment and safety. Be sure to make and go to all appointments, and call your doctor if you are having problems. It's also a good idea to know your test results and keep a list of the medicines you take. How can you care for yourself at home? Take care of your body after delivery · Use pads instead of tampons for the bloody flow that may last as long as 2 weeks. · Ease cramps with ibuprofen (Advil, Motrin). · Ease soreness of hemorrhoids and the area between your vagina and rectum with ice compresses or witch hazel pads. · Ease constipation by drinking lots of fluid and eating high-fiber foods. Ask your doctor about over-the-counter stool softeners. · Cleanse yourself with a gentle squeeze of warm water from a bottle instead of wiping with toilet paper. · Take a sitz bath in warm water several times a day. · Wear a good nursing bra. Ease sore and swollen breasts with warm, wet washcloths. · If you are not breastfeeding, use ice rather than heat for breast soreness. · Your period may not start for several months if you are breastfeeding. You may bleed more, and longer at first, than you did before you got pregnant. · Wait until you are healed (about 4 to 6 weeks) before you have sexual intercourse. Your doctor will tell you when it is okay to have sex. · Try not to travel with your baby for 5 or 6 weeks. If you take a long car trip, make frequent stops to walk around and stretch. Avoid exhaustion · Rest every day. Try to nap when your baby naps. · Ask another adult to be with you for a few days after delivery. · Plan for  if you have other children. · Stay flexible so you can eat at odd hours and sleep when you need to. Both you and your baby are making new schedules. · Plan small trips to get out of the house. Change can make you feel less tired. · Ask for help with housework, cooking, and shopping. Remind yourself that your job is to care for your baby. Know about help for postpartum depression · \"Baby blues\" are common for the first 1 to 2 weeks after birth. You may cry or feel sad or irritable for no reason. · Rest whenever you can. Being tired makes it harder to handle your emotions. · Go for walks with your baby. · Talk to your partner, friends, and family about your feelings. · If your symptoms last for more than a few weeks, or if you feel very depressed, ask your doctor for help. · Postpartum depression can be treated. Support groups and counseling can help. Sometimes medicine can also help. Stay healthy · Eat healthy foods so you have more energy, make good breast milk, and lose extra baby pounds. · If you breastfeed, avoid alcohol and drugs. Stay smoke-free. If you quit during pregnancy, congratulations. · Start daily exercise after 4 to 6 weeks, but rest when you feel tired. · Learn exercises to tone your belly. Do Kegel exercises to regain strength in your pelvic muscles. You can do these exercises while you stand or sit. ¨ Squeeze the same muscles you would use to stop your urine.  Your belly and thighs should not move. ¨ Hold the squeeze for 3 seconds, and then relax for 3 seconds. ¨ Start with 3 seconds. Then add 1 second each week until you are able to squeeze for 10 seconds. ¨ Repeat the exercise 10 to 15 times for each session. Do three or more sessions each day. · Find a class for new mothers and new babies that has an exercise time. · If you had a  birth, give yourself a bit more time before you exercise, and be careful. When should you call for help? Call 911 anytime you think you may need emergency care. For example, call if: 
? · You passed out (lost consciousness). ?Call your doctor now or seek immediate medical care if: 
? · You have severe vaginal bleeding. This means you are passing blood clots and soaking through a pad each hour for 2 or more hours. ? · You are dizzy or lightheaded, or you feel like you may faint. ? · You have a fever. ? · You have new belly pain, or your pain gets worse. ? Watch closely for changes in your health, and be sure to contact your doctor if: 
? · Your vaginal bleeding seems to be getting heavier. ? · You have new or worse vaginal discharge. ? · You feel sad, anxious, or hopeless for more than a few days. ? · You do not get better as expected. Where can you learn more? Go to http://abelardo-james.info/. Enter A461 in the search box to learn more about \"After Your Delivery (the Postpartum Period): Care Instructions. \" Current as of: 2017 Content Version: 11.4 © 0744-3781 [place] DEPT.. Care instructions adapted under license by [employer] (which disclaims liability or warranty for this information). If you have questions about a medical condition or this instruction, always ask your healthcare professional. [address] any warranty or liability for your use of this information. Después del parto (período de posparto): Después de la consulta [After Your Delivery (the Postpartum Period): After Your Visit] Instrucciones de cuidado Felicidades por el nacimiento de saenz bebé. Al igual que el [place], el tiempo con el recién nacido puede ser un momento de [place], [place] y agotamiento. Es posible que se sienta emanuel al mirar la michelle de saenz pequeño bebé. También podría sentirse abrumada por saenz nuevo ritmo de sueño y las nuevas responsabilidades. Al principio, los bebés suelen dormir ayaz el día y permanecen despiertos ayaz la noche. No tienen ningún patrón ni rutina. Podrían pineda gritos ahogados, sacudirse y despertarse, o parecer rajat si tuvieran los ojos cruzados (bizcos). Todo esto es normal, e incluso la pueden hacer sonreír. Ayaz las primeras semanas siguientes al parto, trate de cuidarse willem. Podría tardar de 4 a 6 semanas en volver a sentirse usted misma, y posiblemente más tiempo si le musa hecho lennox cesárea. Es probable que se sienta muy fatigada ayaz varias semanas. Airam días estarán llenos de Yany, corrine también de mucha alegría. La atención de seguimiento es lennox parte clave de saenz tratamiento y seguridad. Asegúrese de hacer y acudir a todas las citas, y llame a saenz médico si está teniendo problemas. También es lennox buena idea saber los resultados de los exámenes y mantener lennox lista de los medicamentos que roxana. Cómo puede cuidarse en el hogar? Cuide saenz cuerpo después del parto · Utilice toallas sanitarias en vez de tampones para las pérdidas de fabrice que podrían durar hasta 2 semanas. · Alivie los cólicos con ibuprofeno (Advil, Motrin). · Alivie el dolor de las hemorroides y la cirilo entre la vagina y el recto con compresas de hielo o de infusión de hamamelis [facility] (\"witch hazel\"). · Alivie el estreñimiento bebiendo abundante líquido y comiendo alimentos ricos en fibra. Pregúntele a saenz médico acerca de los ablandadores de heces de [employer]. · Límpiese con un chorrito suave de agua tibia de lennox botella en vez de hacerlo con papel higiénico. 
· [place] un baño de asiento en agua tibia varias veces al día. · Use un buen sostén de lactancia. Alivie el dolor y la hinchazón de los senos con toallitas de aseo húmedas tibias. · Si no está amamantando, utilice hielo en lugar de calor para aliviar el dolor de los senos. · Si está amamantando, saenz período menstrual podría no comenzar hasta después de varios meses. Es posible que [place], y más tiempo al principio, de rajat lo hacía antes del [facility]. · Espere hasta que haya sanado (de 4 a 6 semanas) antes de volver a tener relaciones sexuales. Saenz médico le dirá cuándo puede tener relaciones sexuales. · Trate de no viajar con el bebé ayaz las primeras 5 o 6 semanas. Si hace un viaje trung en automóvil, chan paradas frecuentes para caminar y estirarse. Evite el agotamiento · Descanse todos los días. Trate de dormir la siesta cuando saenz bebé también lo hace. · Pídale a otro adulto que la acompañe por unos bernie después del [place]. · Planifique el cuidado de los niños si tiene otros hijos. · Sea flexible para que pueda comer a horas fuera de lo común y dormir cuando lo necesite. Tanto usted rajat saenz bebé están creando horarios nuevos. · Planee pequeñas salidas para estar afuera de la casa. El cambio podría hacer que se sienta menos fatigada. · Pida ayuda para cocinar y [address] hogar y las compras. Recuerde que saenz principal tarea consiste en cuidar a saenz bebé. Conozca la ayuda que puede recibir en shannon de tener depresión posparto · La depresión posparto es común ayaz las primeras 1 a 2 semanas siguientes al nacimiento. Podría llorar o sentirse kenneth o irritable sin razón alguna. · Descanse cada vez que pueda hacerlo. Estar fatigada le dificulta manejar herber emociones. · Salga a caminar con saenz bebé. · Hable con saenz kvng, herber amigos y herber familiares acerca de herber sentimientos. · Si herber síntomas duggan más de unas pocas semanas, o si se siente muy deprimida, pídale ayuda a saenz médico. 
· La depresión posparto puede tratarse. Los grupos de apoyo y la asesoría psicológica pueden ser de [facility]. A veces, los medicamentos también pueden ayudar. Manténgase saludable · Coma alimentos sanos para tener más energía, producir lennox buena [place] materna y adelgazar las libras adicionales que engordó con el bebé. · Si amamanta, evite el alcohol y las drogas. No fume. Si dejó de fumar ayaz el embarazo, felicitaciones. · Inicie ejercicios diarios después de 4 a 6 semanas, corrine descanse cuando se sienta fatigada. · Aprenda ejercicios para tonificar el abdomen. Duc los ejercicios de Kegel para recuperar la fuerza de los músculos pélvicos. Puede hacer los ejercicios de Kegel mientras está de pie o sentada. ¨ Apriete los mismos músculos que usted usaría para detener la [place]. El vientre y las nalgas (glúteos) no deben moverse. ¨ Manténgalos apretados ayaz 3 segundos, luego relájelos otros 3 segundos. ¨ Repita el ejercicio entre 10 y 13 veces cada sesión. Duc mamie o más sesiones cada día. · Busque lennox clase para nuevas madres y recién nacidos que tenga un tiempo de ejercicio. · Si le musa hecho lennox cesárea, dese un poco más de tiempo antes de hacer ejercicio, y tenga cuidado. Cuándo debe pedir ayuda? Llame al 911 en cualquier momento que considere que necesita atención de emergencia. Por ejemplo, llame si: 
· Se desmayó (perdió el conocimiento). Llame a saenz médico ahora mismo o busque atención médica inmediata si: · Tiene sangrado vaginal intenso. Aspen significa que está expulsando coágulos sanguíneos y empapando lennox toalla sanitaria cada hora por 2 horas o más. · Está mareada o aturdida, o siente rajat que se puede [address] [facility]. · Tiene fiebre. · Tiene dolor abdominal nuevo o que empeora.  
Preste especial atención a los cambios en saenz bryanna y asegúrese de comunicarse con saenz médico si: 
 · Burch sangrado vaginal parece volverse más intenso. · Tiene flujo vaginal nuevo o que empeora. · Se siente kenneth, ansiosa o sin esperanzas ayaz más de unos pocos días. · No mejora rajat se esperaba. Dónde puede encontrar más información en inglés? Minor Cordia a DealExplorer.be Demetrius Riley X548 en la búsqueda para aprender [employer] de \"Después del parto (período de posparto): Después de la consulta. \"  
© 3934-6604 Healthwise, Incorporated. Instrucciones de cuidado adaptadas bajo licencia por [employer] (which disclaims liability or warranty for this information). Estas instrucciones de cuidado son para usarlas con burch profesional clínico registrado. Si tiene preguntas acerca de lennox afección médica o de estas instrucciones, pregunte siempre a burch profesional de [employer]. Healthwise, Incorporated niega cualquier garantía o responsabilidad por burch uso de esta información. Versión del contenido: [id number]; Última revisión: 20 marzo, 2012 Chart Review Routing History Recipient Method Report Sent By Tonia Maki MD  
Phone: [phone] In Basket IP Auto Routed Notes Aida Barba MD [[id number]] 5/20/2018  6:57 PM 05/20/2018

## 2018-05-20 NOTE — H&P
History & Physical    Name: Tika Dejesus MRN: [id number]  SSN: [id number]    YOB: 1990  Age: 33 y.o. Sex: female      Subjective:     Reason for Admission:  Pregnancy and Contractions    History of Present Illness: Ms. Darren Sepulveda is a 33 y.o. [id number]  female with an estimated gestational age of 42w4d with Estimated Date of Delivery: 18. Patient complains of mild contractions that started this morning ~8am, occurring every 15 minutes. Pregnancy has been complicated by obesity and hx of PTD, on [place] during pregnancy. Patient denies chest pain, fever, headache , nausea and vomiting, right upper quadrant pain  , shortness of breath, swelling, vaginal leaking of fluid  and visual disturbances. However, she endorses some bloody-show prior to arrival.    OB History    Para Term  AB Living   5 3 2 1 1 3   SAB TAB Ectopic Molar Multiple Live Births    1    0      # Outcome Date GA Lbr Giovany/2nd Weight Sex Delivery Anes PTL Lv   5 Current            4 Term    6 lb (2.722 kg)  Vag-Spont      3 TAB      TAB         Complications: Triplet gestation,S/P D&C (status post dilation and curettage)   2 Term    7 lb (3.175 kg)  Vag-Spont      1     5 lb (2.268 kg)  Vag-Spont           History reviewed. No pertinent past medical history. History reviewed. No pertinent surgical history. Social History     Occupational History    Not on file. Social History Main Topics    Smoking status: Never Smoker    Smokeless tobacco: Never Used    Alcohol use No    Drug use: No    Sexual activity: Yes     Partners: Male      History reviewed. No pertinent family history. No Known Allergies  Prior to Admission medications    Medication Sig Start Date End Date Taking? Authorizing Provider   prenatal vit-iron fumarate-fa 27 mg iron- 0.8 mg tab tablet Take 1 Tab by mouth daily.  17   Meng Cervantes MD        Review of Systems:  A comprehensive review of systems was negative except for that written in the History of Present Illness. Immunization History   Administered Date(s) Administered    Influenza Vaccine (Quad) PF 12/12/2017    Tdap 04/20/2018       Objective:     Vitals:    Vitals:    05/20/18 1655 05/20/18 1658 05/20/18 1703 05/20/18 1708   BP:       Pulse:       SpO2:  97% 97% 97%   Weight: 215 lb (97.5 kg)      Height: 5' 2\" (1.575 m)         No data recorded. BP  Min: 129/83  Max: 137/92     Physical Exam:  Patient without distress. Heart: Regular rate and rhythm, S1S2 present or without murmur or extra heart sounds  Lung: clear to auscultation throughout lung fields, no wheezes, no rales, no rhonchi and normal respiratory effort  Abdomen: soft, nontender  Fundus: soft and non tender  Perineum: blood absent, amniotic fluid absent  Cervical Exam: 4 cm dilated    50% effaced    -2 station    Lower Extremities:  No Edema   Membranes:  Intact  Fetal Heart Rate:  Reactive  Baseline: 130 per minute  Variability: moderate  Accelerations: yes  Decelerations: none  Uterine contractions: regular, every 7 minutes       Lab/Data Review:  Recent Results (from the past 24 hour(s))   CBC WITH AUTOMATED DIFF    Collection Time: 05/20/18  5:30 PM   Result Value Ref Range    WBC 9.8 3.6 - 11.0 K/uL    RBC 4.68 3.80 - 5.20 M/uL    HGB 12.6 11.5 - 16.0 g/dL    HCT 37.8 35.0 - 47.0 %    MCV 80.8 80.0 - 99.0 FL    MCH 26.9 26.0 - 34.0 PG    MCHC 33.3 30.0 - 36.5 g/dL    RDW 13.8 11.5 - 14.5 %    PLATELET 642 631 - 621 K/uL    MPV 9.4 8.9 - 12.9 FL    NRBC 0.0 0  WBC    ABSOLUTE NRBC 0.00 0.00 - 0.01 K/uL    NEUTROPHILS 64 32 - 75 %    LYMPHOCYTES 27 12 - 49 %    MONOCYTES 7 5 - 13 %    EOSINOPHILS 1 0 - 7 %    BASOPHILS 0 0 - 1 %    IMMATURE GRANULOCYTES 1 (H) 0.0 - 0.5 %    ABS. NEUTROPHILS 6.3 1.8 - 8.0 K/UL    ABS. LYMPHOCYTES 2.6 0.8 - 3.5 K/UL    ABS. MONOCYTES 0.7 0.0 - 1.0 K/UL    ABS. EOSINOPHILS 0.1 0.0 - 0.4 K/UL    ABS. BASOPHILS 0.0 0.0 - 0.1 K/UL    ABS. IMM.  GRANS. 0.1 (H) 0.00 - 0.04 K/UL    DF AUTOMATED         Assessment and Plan:     Ms. Shira Lara is a 33 y.o. [id number]  female with an estimated gestational age of 42w4d who is admitted to L&D for contractions. PNL: A +, AB screen neg, HepBsAg neg, G/C neg, HIV NR, T pal neg, Rubella/VZV immune. 1Hr GTT wnl. GBS POSITIVE. 1. IUP:  -    PNL labs reviewed. GBS positive and will order PCN.   -    NST: FHT: baseline 130, + accelerations, no decels, moderate variability. Category 1 tracing. Mikes: Ctx every 7min, regular  -    SVE /-2 at 5pm.   -    Tdap given on   -    Cephalic position confirmed by US on arrival    2. Hx of PTD: Patient on Tessa during pregnancy. 3. Patient desire an epidural. Anesthesia consulted    4. Anticipate     5.  Patient will follow-up with [facility] post-partum        Patient discussed with Dr. Kanika Soto MD  Family Medicine Resident

## 2018-05-21 PROCEDURE — 74011250637 HC RX REV CODE- 250/637: Performed by: STUDENT IN AN ORGANIZED HEALTH CARE EDUCATION/TRAINING PROGRAM

## 2018-05-21 PROCEDURE — 65270000029 HC RM PRIVATE

## 2018-05-21 PROCEDURE — 77030011943

## 2018-05-21 RX ADMIN — IBUPROFEN 800 MG: 800 TABLET ORAL at 05:35

## 2018-05-21 RX ADMIN — IBUPROFEN 800 MG: 800 TABLET ORAL at 22:10

## 2018-05-21 RX ADMIN — IBUPROFEN 800 MG: 800 TABLET ORAL at 13:57

## 2018-05-21 RX ADMIN — Medication 1 TABLET: at 09:02

## 2018-05-21 RX ADMIN — DOCUSATE SODIUM 100 MG: 100 CAPSULE, LIQUID FILLED ORAL at 09:02

## 2018-05-21 NOTE — PROGRESS NOTES
[address] [facility] [address]   Office [phone], Fax [phone]    Labor Progress Note  S:  Pt seen and examined at bedside. She is doing well without any complaints at this time. Epidural is placed. Denies any abd pain, sob,cp or headache. No data found. Physical Exam:  General: NAD, wellnourished, alert  Extremities: no edema, no cyanosis  Cervical Exam:    SVE: 6/100/-2. Examined by Catherine Tafoya RN at 9 PM  Position: Mid  Cervical Consistency: Soft  Baby Position: Vertex  Membrane Status: Intact with bulging bag   Uterine Activity: contractions q3-7 mins  Fetal Heart Rate: 125, Mod variability, +acc, no dec    Assessment/Plan:  Bette Agrawal is a 33 y.o.  at 37 1/7 wks in active labor, GBS positive, cat 1 fetal tracing. PNL: A +, AB screen neg, HepBsAg neg, G/C neg, HIV NR, T pal neg, Rubella/VZV immune. 1Hr GTT wnl. GBS POSITIVE. PLAN:  -Give 2nd dose of PCN, then with rupture her membranes.  -Cont labor management. Epidural in place.  Anticipated   Patient discussed with Dr. Tommy Castro [facility] hospitalist)  Carol Simpson MD  Family Medicine Resident

## 2018-05-21 NOTE — ANESTHESIA PROCEDURE NOTES
Epidural Block    Start time: 5/20/2018 8:52 PM  End time: 5/20/2018 9:04 PM  Performed by: Terrence Hicks by: Arely Rogers     Pre-Procedure  Indication: labor epidural    Preanesthetic Checklist: patient identified, risks and benefits discussed, anesthesia consent, timeout performed and anesthesia consent      Epidural:   Patient position:  Seated  Prep region:  Lumbar  Prep: Betadine    Location:  L3-4    Needle and Epidural Catheter:   Needle Type:  Tuohy  Needle Gauge:  17 G  Injection Technique:  Loss of resistance using air  Attempts:  1  Catheter Size:  18 G  Catheter at Skin Depth (cm):  6  Depth in Epidural Space (cm):  9  Events: no blood with aspiration, no cerebrospinal fluid with aspiration, no paresthesia and negative aspiration test    Test Dose:  Lidocaine 1.5% w/ epi and negative    Assessment:   Catheter Secured:  Tegaderm and tape  Insertion:  Uncomplicated  Patient tolerance:  Patient tolerated the procedure well with no immediate complications

## 2018-05-21 NOTE — LACTATION NOTE
This note was copied from a baby's chart. Discussed with mother her plan for feeding. Reviewed the benefits of exclusive breast milk feeding during the hospital stay. Informed her of the risks of using formula to supplement in the first few days of life as well as the benefits of successful breast milk feeding; referred her to the Breastfeeding booklet about this information. She acknowledges understanding of information reviewed and states that it is her plan to blend feed breast and formula for her 4th infant. Will support her choice and offer additional information as needed. Pt will successfully establish breastfeeding by feeding in response to early feeding cues   or wake every 3h, will obtain deep latch, and will keep log of feedings/output. Taught to BF at hunger cues and or q 2-3 hrs and to offer 10-20 drops of hand expressed colostrum at any non-feeds.       Breast Assessment  Left Breast: Medium, Large  Left Nipple: Everted, Intact  Right Breast: Medium, Large  Right Nipple: Everted, Intact  Breast- Feeding Assessment  Attends Breast-Feeding Classes: No (All BF ed per [place] [employer] phone  # [phone].)  Breast-Feeding Experience: Yes (This is mom's 4th BF child, blend feeds initially and then BF more, BF each of the older children for 8+ mo each)  Breast Trauma/Surgery: No  Type/Quality: Good (Successful BF session observed)  Lactation Consultant Visits  Breast-Feedings: Good   Mother/Infant Observation  Mother Observation: Alignment, Breast comfortable, Recognizes feeding cues  Infant Observation: Feeding cues, Opens mouth, Rhythmic suck  LATCH Documentation  Latch: Grasps breast, tongue down, lips flanged, rhythmic sucking  Audible Swallowing: A few with stimulation  Type of Nipple: Everted (after stimulation)  Comfort (Breast/Nipple): Soft/non-tender  Hold (Positioning): No assist from staff, mother able to position/hold infant  LATCH Score: 9

## 2018-05-21 NOTE — ROUTINE PROCESS
Bedside shift change report given to [address] [place] [address] (oncoming nurse) by Homer Arenas RN (offgoing nurse). Report included the following information SBAR, Kardex, MAR and Recent Results.

## 2018-05-21 NOTE — PROGRESS NOTES
Post-Partum Day Number 1 Progress Note    Patient doing well post-partum without significant complaint. Pain well controlled. Lochia appropriate. Tolerating regular diet. Ambulating. Voiding without difficulty. Passing flatus, but no BM yet. Vitals:  Patient Vitals for the past 8 hrs:   BP Temp Pulse Resp   18 0637 105/57 98.4 °F (36.9 °C) 77 16   18 0202 125/69 98.4 °F (36.9 °C) 73 16   18 0013 119/64 - 73 -     Temp (24hrs), Av.6 °F (37 °C), Min:98.4 °F (36.9 °C), Max:98.8 °F (37.1 °C)      Exam:  Patient without distress. CTAB, no w/r/r/c.               RRR, +S1 and S2, no m/r/g. Abdomen soft, fundus firm below level of umbilicus, nontender. Perineum with normal lochia noted. Lower extremities:  No edema. No palpable cords or tenderness. Lab/Data Review:  No results found for this or any previous visit (from the past 12 hour(s)). Assessment and Plan:    Pedro Tavera is a 33 y.o. [id number] s/p  at 37 weeks 1 days. Patient appears to be having uncomplicated post-partum course. Of note, PNL: A +, AB screen neg, HepBsAg neg, G/C neg, HIV NR, T pal neg, Rubella/VZV immune. 1Hr GTT wnl. GBS POSITIVE. Tdap received on 18. 1. Continue routine perineal care and maternal education. 2. GBS positive and appropriately treated  3. Pain well-controlled on current regimen  4. Plan discharge tomorrow if no problems occur.  Patient will follow-up with [facility]      Patient discussed with Dr. Kena Burdick MD  [facility] Medicine Resident

## 2018-05-21 NOTE — PROGRESS NOTES
05/21/18 10:09 AM  CM met with BHAVIN to complete initial assessment and to begin discharge planning. Demographics were reviewed and confirmed. BHAVIN lives at the listed address with her /FOB Krystyna Bound and her 2 daughters, who are 5 and 9 MOB's 3year old son lives back in [place]. BHAVIN works as a  and will return to work \"in about a month. \"  LEAH works and is working today; he likely not be able to take any time off from work. BHAVIN noted that she does not have any local family, but has a few friends to help as needed. BHAVIN is breast and bottle feeding formula. Dr. Keaton Jade will provide medical follow up for the baby. Patient has car seat, crib, clothing, and other necessary supplies. BHAVIN currently has [facility] services and was encouraged to call today to schedule an enrollment appointment for the baby; CM wrote [facility] office number on white board for BHAVIN. BHAVIN stated that she applied for the Care Card at the clinic and will need Medicaid for the baby; CM made [facility] aware and they will follow up with BHAVIN today. Care Management Interventions  PCP Verified by CM:  Yes  Mode of Transport at Discharge: Self  Transition of Care Consult (CM Consult): Discharge Planning  Current Support Network: Lives with Spouse  Confirm Follow Up Transport: Family  Plan discussed with Pt/Family/Caregiver: Yes  Discharge Location  Discharge Placement: Home with family assistance  ROWDY Dang

## 2018-05-21 NOTE — PROGRESS NOTES
: TO and pt sitting up for epidural placement by Leslie Victor MD  Fay Krueger MD at bedside to perform AROM. Moderate amount of fluid, clear, without odor. : Delivery of live  female. Nuchal x1, reduced, without compressions. No lacerations. 2010: Using [employer] phone;  #[phone]. RN asks pt if she is in pain. Pt denies pain at this time. When asked, pt states that she still has numbness and tingling in the right leg. RN advises pt not to get out of the bed on her own. RN tells the pt that her epidural catheter will be removed when she gets out of bed for the first time. RN tells pt that she will be moved to the postpartum unit after her last two fundal exams as long as she and baby are fine. RN asks pt if she would like crackers and drink - pt requests daniel crackers and pepsi. RN gives the pt opportunity to asks questions. Pt asks Boogie He have they not checked the baby again. \" RN clarifies through the  if the pt means checking the baby's weight and measurements. The pt concurs. The RN explains that the weight and measurements are delayed so that the parents may bond with the baby, but I can let the nursery nurse know if she would like the baby weighed now. The pt states \"no, that is fine. \" The pt is again given opportunity to ask questions, but she has none. 0130: Pt ambulated successfully to the  with the assistance of two RNs. Pt successfully voided 600ml of clear yellow urine. 0200: TRANSFER - OUT REPORT:    Verbal report given to [employer] RN (name) on Lauri Huerta  being transferred to MIU (unit) for routine progression of care       Report consisted of patients Situation, Background, Assessment and   Recommendations(SBAR). Information from the following report(s) SBAR, Kardex, Intake/Output, MAR, Accordion, Recent Results and Med Rec Status was reviewed with the receiving nurse.     Lines:   Peripheral IV 18 Left Hand (Active)   Site Assessment Clean, dry, & intact 5/20/2018  7:35 PM   Phlebitis Assessment 0 5/20/2018  7:35 PM   Infiltration Assessment 0 5/20/2018  7:35 PM   Dressing Status Clean, dry, & intact 5/20/2018  7:35 PM   Dressing Type Transparent;Tape 5/20/2018  7:35 PM   Hub Color/Line Status Pink;Patent; Infusing 5/20/2018  7:35 PM   Action Taken Open ports on tubing capped 5/20/2018  7:35 PM   Alcohol Cap Used Yes 5/20/2018  7:35 PM        Opportunity for questions and clarification was provided.       Patient transported with:   Registered Nurse

## 2018-05-21 NOTE — PROGRESS NOTES
Teaching provided using blue interpretor phone about infant feedings, bath, sleep safety. Ordered breakfast for patient and provided [place] teaching materials to pt.

## 2018-05-21 NOTE — L&D DELIVERY NOTE
Delivery Summary    Patient: Jamaica Calixto MRN: [id number]  SSN: [id number]    YOB: 1990  Age: 33 y.o. Sex: female        Labor Events:    Labor:      Rupture Date: 2018    Rupture Time: 10:47 PM    Rupture Type AROM    Amniotic Fluid Volume:      Amniotic Fluid Description: Clear  None    Induction:          Augmentation: AROM    Labor Complications: None     Additional Complications:        Cervical Ripening:       None      Delivery Events:  Episiotomy: None    Laceration(s): None      Repaired:       Number of Repair Packets:      Suture Type and Size:         Estimated Blood Loss (ml):   300       Information for the patient's newbornElicoleen Carrasquillo, Female [[id number]]     Delivery Summary - Baby    Delivery Date: 2018   Delivery Time: 11:00 AM   Delivery Type: Vaginal, Spontaneous Delivery  Sex:  female  Gestational Age: 42w4d  Delivery Clinician:  Marla Dudley  Living?: Living   Delivery Location: L&D L&D 205           APGARS  One minute Five minutes Ten minutes   Skin Color: 0    1       Heart Rate: 2   2         Reflex Irritability: 2   2         Muscle Tone: 2   2       Respiration: 2   2         Total: 8   9           Presentation: Vertex  Position:   Occiput    Resuscitation Method:  Tactile Stimulation;Suctioning-bulb     Meconium Stained: None    Cord Information: 3 Vessels   Complications: Nuchal Cord Without Compressions  Cord Blood Sent?:  No    Blood Gases Sent?:  No    Placenta:  Date/Time:  11:05 PM  Removal: Spontaneous      Appearance: Normal     [place] Measurements:  Birth Weight:      Birth Length:     Head Circumference:       Chest Circumference:      Abdominal Girth:       Other Providers:   Neil RUTH;CAROLYN GUZMAN;FLAVIA PRYOR;KATY BERNARD ANDRE G;EDYTA DUPONT Obstetrician;Primary Nurse;Primary  Nurse;Charge Nurse;Resident;Scrub Tech           Cord Blood Results:  Information for the patient's : Izaiah Loyd, Female [[id number]]   No results found for: Karen Michelle, PCTDIG, BILI, ABORHEXT, [address]    Information for the patient's :  Izaiah Loyd, Female [[id number]]   No results found for: APH, APCO2, APO2, AHCO3, ABEC, ABDC, O2ST, SITE, [place], PHI, [place], PO2I, HCO3I, SO2I, IBD     Information for the patient's :  Izaiah Loyd, Female [[id number]]   No results found for: EPHV, PCO2V, PO2V, HCO3V, O2STV, EBDV

## 2018-05-21 NOTE — ROUTINE PROCESS
Bedside and Verbal shift change report given to LONNIE Bonilla RN (oncoming nurse) by [employer]. Carine Rico RN (offgoing nurse). Report included the following information SBAR, Kardex, Intake/Output, MAR, Accordion and Recent Results.

## 2018-05-22 VITALS
TEMPERATURE: 98.2 F | BODY MASS INDEX: 39.56 KG/M2 | RESPIRATION RATE: 16 BRPM | WEIGHT: 215 LBS | HEART RATE: 64 BPM | OXYGEN SATURATION: 95 % | HEIGHT: 62 IN | SYSTOLIC BLOOD PRESSURE: 119 MMHG | DIASTOLIC BLOOD PRESSURE: 73 MMHG

## 2018-05-22 PROCEDURE — 74011250637 HC RX REV CODE- 250/637: Performed by: STUDENT IN AN ORGANIZED HEALTH CARE EDUCATION/TRAINING PROGRAM

## 2018-05-22 RX ORDER — DOCUSATE SODIUM 100 MG/1
100 CAPSULE, LIQUID FILLED ORAL
Qty: 30 CAP | Refills: 0 | Status: SHIPPED | OUTPATIENT
Start: 2018-05-22

## 2018-05-22 RX ORDER — IBUPROFEN 800 MG/1
800 TABLET ORAL
Qty: 20 TAB | Refills: 0 | Status: SHIPPED | OUTPATIENT
Start: 2018-05-22

## 2018-05-22 RX ADMIN — Medication 1 TABLET: at 10:48

## 2018-05-22 RX ADMIN — IBUPROFEN 800 MG: 800 TABLET ORAL at 05:50

## 2018-05-22 NOTE — ROUTINE PROCESS
Bedside and Verbal shift change report given to Aquiles Bassett RN (oncoming nurse) by Rodger Irving RN (offgoing nurse). Report included the following information SBAR, Kardex and MAR.

## 2018-05-22 NOTE — ROUTINE PROCESS
Patient off unit in stable condition via wheelchair with volunteers for discharge home per Dr. Abdon Zhou . Patient is to follow-up in 6 weeks. Prescriptions given to patient (Farhana, Colace). Infant in car seat with mother.

## 2018-05-22 NOTE — DISCHARGE SUMMARY
Obstetrical Discharge Summary     Name: Bar Rodriguez MRN: [id number]  SSN: [id number]    YOB: 1990  Age: 33 y.o. Sex: female      Admit Date: 2018    Discharge Date: 2018     Admitting Physician: Heavenly Gonzalez MD     Attending Physician:  Dino Freeman DO     Admission Diagnoses: Uterine contractions during pregnancy    Discharge Diagnoses:   Information for the patient's :  Ebb Laith Female [[id number]]   Delivery of a 3.045 kg female infant via Vaginal, Spontaneous Delivery on 2018 at 11:00 AM  by . Apgars were 8 and 9. Additional Diagnoses:   Hospital Problems  Date Reviewed: 2018          Codes Class Noted POA    Uterine contractions during pregnancy ICD-10-CM: O62.2  ICD-9-CM: 661.20  2018 Unknown             Lab Results   Component Value Date/Time    Rubella, External Immune 2017    GrBStrep, External Positive 2018       Immunization(s):   Immunization History   Administered Date(s) Administered    Influenza Vaccine (Quad) PF 2017    Tdap 2018        Hospital Course:   Pt presented to L&D on 18 complaining of contractions. Had AROM at 2247 on 18. Had an uncomplicated  with intact perineium of a TLFI with apgars of 8,9 at 2300 on 18. EBL: 300ml. Pt had an uncomplicated postpartum course. She is doing well with normal lochia. Pain is well controlled with Motrin. Pt has flatus but no bowel movements. Exam:   Visit Vitals    /73 (BP 1 Location: Left arm, BP Patient Position: At rest)    Pulse 64    Temp 98.2 °F (36.8 °C)    Resp 16    Ht 5' 2\" (1.575 m)    Wt 215 lb (97.5 kg)    SpO2 95%    Breastfeeding Yes    BMI 39.32 kg/m2       Patient without distress. CTAB, no w/r/r/c.               RRR, +S1 and S2, no m/r/g. Abdomen soft, fundus firm below level of umbilicus, nontender. Perineum with normal lochia noted.                Lower extremities:  No edema. No palpable cords or tenderness.     Condition at Discharge: Stable    Disposition:  Home  Follow up: Pt instructed to call [facility] to make an appointment in 6 weeks. Patient Instructions:   Current Discharge Medication List   Motrin 800mg q8 prn pain  Colace 100mg every day as needed for constipation   CONTINUE these medications which have NOT CHANGED    Details   prenatal vit-iron fumarate-fa 27 mg iron- 0.8 mg tab tablet Take 1 Tab by mouth daily. Qty: 30 Tab, Refills: 9    Associated Diagnoses: 16 weeks gestation of pregnancy             Reference my discharge instructions. No orders of the defined types were placed in this encounter. Signed By:  Anabell Anderson MD    Family Medicine Resident      Attending attestation:  I saw and evaluated the patient, performing the key elements of the service. I discussed the findings, assessment and plan with the resident and agree with the resident's findings and plan as documented in the resident's note. Discharged in stable condition. F/u in 6 wks.     Aishwarya Mahmood MD  05/22/18  2:28 PM

## 2018-05-22 NOTE — PROGRESS NOTES
I saw and evaluated the patient, performing the key elements of the service. I discussed the findings, assessment and plan with the resident and agree with the resident's findings and plan as documented in the resident's note. I personally reviewed the NST with mild irregular contractions. Cat 2 FHR tracing. SVE: 2-3/50/-3. Return later in the week due to hx of PTD/PTL. S/p adolph. GBS pos.

## 2018-05-22 NOTE — LACTATION NOTE
Problem: Lactation Care Plan  Goal: *Infant latching appropriately  Outcome: Resolved/Met Date Met: 05/22/18  Mom states baby  Nurses well, but seems hungry after feeding and is also giving formula.     Pt will successfully establish breastfeeding by feeding in response to early feeding cues   or wake every 3h, will obtain deep latch, and will keep log of feedings/output. Taught to BF at hunger cues and or q 2-3 hrs and to offer 10-20 drops of hand expressed colostrum at any non-feeds.       Breast Assessment  Left Breast: Medium, Large  Left Nipple: Everted, Intact  Right Breast: Medium, Large  Right Nipple: Everted, Intact  Breast- Feeding Assessment  Attends Breast-Feeding Classes: No (All BF ed per [place] [employer] phone  # [phone].)  Breast-Feeding Experience: Yes (This is mom's 4th BF child, blend feeds initially and then BF more, BF each of the older children for 8+ mo each)  Breast Trauma/Surgery: No  Type/Quality:  (Good per mom, not seen at breast)     Goal: *Weight loss less than 10% of birth weight  Outcome: Resolved/Met Date Met: 05/22/18  Encouraged mom to attempt feeding with baby led feeding cues. Just as sucking on fingers, rooting, mouthing. Looking for 8-12 feedings in 24 hours. Don't limit baby at breast, allow baby to come of breast on it's own. Baby may want to feed  often and may increase number of feedings on second day of life. Skin to skin encouraged.      If baby doesn't nurse,  Mom should  hand express  10-20 drops of colostrum and drip into baby's mouth, or give to baby by finger feeding, cup feeding, or spoon feeding at least every 2-3 hours. Mom may  Pump and give infant any expressed milk. If not pumping any milk, mom should contact pediatrician for possible need for supplementation.      Mom also giving formula.   Discussed.      Problem: Patient Education: Go to Patient Education Activity  Goal: Patient/Family Education  Outcome: Resolved/Met Date Met: 05/22/18  Discussed eating a healthy diet. Instructed mother to eat a variety of foods in order to get a well balanced diet. She should consume an extra 300-500 calories per day (more than her non-pregnant requirement.) These extra calories will help provide energy needed for optimal breast milk production. Mother also encouraged to \"drink to thirst\" and it is recommended that she drink fluids such as water and fruit/vegetable juice. Nutritious snacks should be available so that she can eat throughout the day to help satisfy her hunger and maintain a good milk supply. Continue taking your prenatal vitamins as long as you breast feed.      Chart shows numerous feedings, void, stool WNL. Discussed Importance of monitoring outputs and feedings on first week of  Breastfeeding. Discussed ways to tell if baby getting enough, ie  Voids and stools, by day 7, baby should have at least  4-6 wet diapers a day, change in color of stool to a seedy yellow, and return to birth wt within 2 weeks with a steady increase after that. .  Follow up with pediatrician visit for weight check in 1-2 days reviewed. Discussed Breast feeding support groups and encouraged to call [employer] line number, [phone] or [employer] at [phone]  for any breast feeding questions/problems that arise.      Encouraged mom to attempt feeding with baby led feeding cues. Just as sucking on fingers, rooting, mouthing. Looking for 8-12 feedings in 24 hours. Don't limit baby at breast, allow baby to come of breast on it's own. Baby may want to feed  often and may increase number of feedings on second day of life. Skin to skin encouraged. In 4-6 weeks, baby may go though a growth spurt and increase feedings for several days to increase your milk supply.       If baby doesn't nurse,  Mom should Pump and give infant any expressed milk.   If not pumping any milk, mom should contact pediatrician for possible need for supplementation.        Engorgement Care Guidelines:  Anticipatory guidance shared. Reviewed how milk is made and normal phases of milk production. Taught care of engorged breasts  and how to help decrease engorgement. If mom should experience engorged breast, frequent breastfeeding encouraged, cool packs around breast and motrin or Ibuprofen as ordered by your Doctor.        Call your doctor, midwife and/or lactation consultant if:   · Baby is having no wet or dirty diapers   · Baby has dark colored urine after day 3  (should be pale yellow to clear)   · Baby has dark colored stools after day 4  (should be mustard yellow, with no meconium)   · Baby has fewer wet/soiled diapers or nurses less   frequently than the goals listed here   · Mom has symptoms of mastitis   (sore breast with fever, chills, flu-like aching)          Pt chooses to do both breast and bottle.   Discussed effects of early supplementation on breastfeeding success; may decrease breastmilk production and supply, increase risk for pathological engorgement, baby may develop preference for faster flow from bottles vs breast, and baby's stomach can be stretched if larger volumes of formula are given.

## 2025-06-17 NOTE — DISCHARGE INSTRUCTIONS
After Your Delivery (the Postpartum Period): Care Instructions  Your Care Instructions    Congratulations on the birth of your baby. Like pregnancy, the  period can be a time of excitement, fernanda, and exhaustion. You may look at your wondrous little baby and feel happy. You may also be overwhelmed by your new sleep hours and new responsibilities. At first, babies often sleep during the days and are awake at night. They do not have a pattern or routine. They may make sudden gasps, jerk themselves awake, or look like they have crossed eyes. These are all normal, and they may even make you smile. In these first weeks after delivery, try to take good care of yourself. It may take 4 to 6 weeks to feel like yourself again, and possibly longer if you had a  birth. You will likely feel very tired for several weeks. Your days will be full of ups and downs, but lots of fernanda as well. Follow-up care is a key part of your treatment and safety. Be sure to make and go to all appointments, and call your doctor if you are having problems. It's also a good idea to know your test results and keep a list of the medicines you take. How can you care for yourself at home? Take care of your body after delivery  · Use pads instead of tampons for the bloody flow that may last as long as 2 weeks. · Ease cramps with ibuprofen (Advil, Motrin). · Ease soreness of hemorrhoids and the area between your vagina and rectum with ice compresses or witch hazel pads. · Ease constipation by drinking lots of fluid and eating high-fiber foods. Ask your doctor about over-the-counter stool softeners. · Cleanse yourself with a gentle squeeze of warm water from a bottle instead of wiping with toilet paper. · Take a sitz bath in warm water several times a day. · Wear a good nursing bra. Ease sore and swollen breasts with warm, wet washcloths. · If you are not breastfeeding, use ice rather than heat for breast soreness.   · Your period may not start for several months if you are breastfeeding. You may bleed more, and longer at first, than you did before you got pregnant. · Wait until you are healed (about 4 to 6 weeks) before you have sexual intercourse. Your doctor will tell you when it is okay to have sex. · Try not to travel with your baby for 5 or 6 weeks. If you take a long car trip, make frequent stops to walk around and stretch. Avoid exhaustion  · Rest every day. Try to nap when your baby naps. · Ask another adult to be with you for a few days after delivery. · Plan for  if you have other children. · Stay flexible so you can eat at odd hours and sleep when you need to. Both you and your baby are making new schedules. · Plan small trips to get out of the house. Change can make you feel less tired. · Ask for help with housework, cooking, and shopping. Remind yourself that your job is to care for your baby. Know about help for postpartum depression  · \"Baby blues\" are common for the first 1 to 2 weeks after birth. You may cry or feel sad or irritable for no reason. · Rest whenever you can. Being tired makes it harder to handle your emotions. · Go for walks with your baby. · Talk to your partner, friends, and family about your feelings. · If your symptoms last for more than a few weeks, or if you feel very depressed, ask your doctor for help. · Postpartum depression can be treated. Support groups and counseling can help. Sometimes medicine can also help. Stay healthy  · Eat healthy foods so you have more energy, make good breast milk, and lose extra baby pounds. · If you breastfeed, avoid alcohol and drugs. Stay smoke-free. If you quit during pregnancy, congratulations. · Start daily exercise after 4 to 6 weeks, but rest when you feel tired. · Learn exercises to tone your belly. Do Kegel exercises to regain strength in your pelvic muscles. You can do these exercises while you stand or sit.   ¨ Squeeze the same muscles you would use to stop your urine. Your belly and thighs should not move. ¨ Hold the squeeze for 3 seconds, and then relax for 3 seconds. ¨ Start with 3 seconds. Then add 1 second each week until you are able to squeeze for 10 seconds. ¨ Repeat the exercise 10 to 15 times for each session. Do three or more sessions each day. · Find a class for new mothers and new babies that has an exercise time. · If you had a  birth, give yourself a bit more time before you exercise, and be careful. When should you call for help? Call 911 anytime you think you may need emergency care. For example, call if:  ? · You passed out (lost consciousness). ?Call your doctor now or seek immediate medical care if:  ? · You have severe vaginal bleeding. This means you are passing blood clots and soaking through a pad each hour for 2 or more hours. ? · You are dizzy or lightheaded, or you feel like you may faint. ? · You have a fever. ? · You have new belly pain, or your pain gets worse. ? Watch closely for changes in your health, and be sure to contact your doctor if:  ? · Your vaginal bleeding seems to be getting heavier. ? · You have new or worse vaginal discharge. ? · You feel sad, anxious, or hopeless for more than a few days. ? · You do not get better as expected. Where can you learn more? Go to http://abelardo-james.info/. Enter A461 in the search box to learn more about \"After Your Delivery (the Postpartum Period): Care Instructions. \"  Current as of: 2017  Content Version: 11.4  © 8238-8615 [employer]. Care instructions adapted under license by [employer] (which disclaims liability or warranty for this information). If you have questions about a medical condition or this instruction, always ask your healthcare professional. [address]  any warranty or liability for your use of this information.     Después del Marino (período de posparto): Después de la consulta  [After Your Delivery (the Postpartum Period): After Your Visit]  Instrucciones de cuidado  Felicidades por el nacimiento de saenz bebé. Al igual que el [place], el tiempo con el recién nacido puede ser un momento de [place], Maria Luisa Angel y agotamiento. Es posible que se sienta emanuel al mirar la michelle de saenz pequeño bebé. También podría sentirse abrumada por saenz nuevo ritmo de sueño y las nuevas responsabilidades. Al principio, los bebés suelen dormir ayaz el día y permanecen despiertos ayaz la noche. No tienen ningún patrón ni rutina. Podrían pineda gritos ahogados, sacudirse y despertarse, o parecer rajat si tuvieran los ojos cruzados (bizcos). Todo esto es normal, e incluso la pueden hacer sonreír. Ayaz las primeras semanas siguientes al parto, trate de cuidarse willem. Podría tardar de 4 a 6 semanas en volver a sentirse usted misma, y posiblemente más tiempo si le musa hecho lennox cesárea. Es probable que se sienta muy fatigada ayaz varias semanas. Airam días estarán llenos de Yany, corrine también de mucha alegría. La atención de seguimiento es lennox parte clave de saenz tratamiento y seguridad. Asegúrese de hacer y acudir a todas las citas, y llame a saenz médico si está teniendo problemas. También es lennox buena idea saber los resultados de los exámenes y mantener lennox lista de los medicamentos que roxana. ¿Cómo puede cuidarse en el hogar? Cuide saenz cuerpo después del parto  · Utilice toallas sanitarias en vez de tampones para las pérdidas de fabrice que podrían durar hasta 2 semanas. · Alivie los cólicos con ibuprofeno (Advil, Motrin). · Alivie el dolor de las hemorroides y la cirilo entre la vagina y el recto con compresas de hielo o de infusión de hamamelis Louvenia Hanks (\"witch hazel\"). · Alivie el estreñimiento bebiendo abundante líquido y comiendo alimentos ricos en fibra. Pregúntele a saenz médico acerca de los ablandadores de heces de [employer].   · Límpiese con un chorrito suave de agua tibia de lennox botella en vez de hacerlo con papel higiénico.  · [place] un baño de asiento en agua tibia varias veces al día. · Use un buen sostén de lactancia. Alivie el dolor y la hinchazón de los senos con toallitas de aseo húmedas tibias. · Si no está amamantando, utilice hielo en lugar de calor para aliviar el dolor de los senos. · Si está amamantando, saenz período menstrual podría no comenzar hasta después de varios meses. Es posible que [place], y más tiempo al principio, de rajat lo hacía antes del Susie Halt. · Espere hasta que haya sanado (de 4 a 6 semanas) antes de volver a tener relaciones sexuales. Saenz médico le dirá cuándo puede tener relaciones sexuales. · Trate de no viajar con el bebé ayaz las primeras 5 o 6 semanas. Si hace un viaje trung en automóvil, chan paradas frecuentes para caminar y estirarse. Evite el agotamiento  · Descanse todos los días. Trate de dormir la siesta cuando saenz bebé también lo hace. · Pídale a otro adulto que la acompañe por unos bernie después del Marino. · Planifique el cuidado de los niños si tiene otros hijos. · Sea flexible para que pueda comer a horas fuera de lo común y dormir cuando lo necesite. Tanto usted rajat saenz bebé están creando horarios nuevos. · Planee pequeñas salidas para estar afuera de la casa. El cambio podría hacer que se sienta menos fatigada. · Pida ayuda para cocinar y [address] hogar y las compras. Recuerde que saenz principal tarea consiste en cuidar a saenz bebé. Conozca la ayuda que puede recibir en shannon de tener depresión posparto  · La depresión posparto es común ayaz las primeras 1 a 2 semanas siguientes al nacimiento. Podría llorar o sentirse kenneth o irritable sin razón alguna. · Descanse cada vez que pueda hacerlo. Estar fatigada le dificulta manejar herber emociones. · Salga a caminar con saenz bebé. · Hable con saenz kvng, herber amigos y herber familiares acerca de herber sentimientos.   · Si herber síntomas Papo Rend de [employer], o si se siente muy deprimida, pídale ayuda a saenz médico.  · La depresión posparto puede tratarse. Los grupos de apoyo y la asesoría psicológica pueden ser de [place]. A veces, los medicamentos también pueden ayudar. Manténgase saludable  · Coma alimentos sanos para tener más energía, producir lennox buena [place] materna y adelgazar las libras adicionales que engordó con el bebé. · Si amamanta, evite el alcohol y las drogas. No fume. Si dejó de fumar ayaz el embarazo, felicitaciones. · Inicie ejercicios diarios después de 4 a 6 semanas, corrine descanse cuando se sienta fatigada. · Aprenda ejercicios para tonificar el abdomen. Duc los ejercicios de Kegel para recuperar la fuerza de los músculos pélvicos. Puede hacer los ejercicios de Kegel mientras está de pie o sentada. ¨ Apriete los mismos músculos que usted usaría para detener la [place]. El vientre y las nalgas (glúteos) no deben moverse. ¨ Manténgalos apretados ayaz 3 segundos, luego relájelos otros 3 segundos. ¨ Repita el ejercicio entre 10 y 13 veces cada sesión. Duc mamie o más sesiones cada día. · Busque lennox clase para nuevas madres y recién nacidos que tenga un tiempo de ejercicio. · Si le musa hecho lennox cesárea, dese un poco más de tiempo antes de hacer ejercicio, y tenga cuidado. ¿Cuándo debe pedir ayuda? Llame al 911 en cualquier momento que considere que necesita atención de emergencia. Por ejemplo, llame si:  · Se desmayó (perdió el conocimiento). Llame a saenz médico ahora mismo o busque atención médica inmediata si:  · Tiene sangrado vaginal intenso. [place] significa que está expulsando coágulos sanguíneos y empapando lennox toalla sanitaria cada hora por 2 horas o más. · Está mareada o aturdida, o siente rajat que se puede [address] [place] [address] 15. · Tiene fiebre. · Tiene dolor abdominal nuevo o que empeora.   Preste especial atención a los cambios en saenz bryanna y asegúrese de comunicarse con saenz médico si:  · Saenz sangrado vaginal parece volverse más intenso. · Tiene flujo vaginal nuevo o que empeora. · Se siente kenneth, ansiosa o sin esperanzas ayaz más de unos pocos días. · No mejora rajat se esperaba. ¿Dónde puede encontrar más información en inglés? Harshil Slimmer a [email or web address]  [employer] [address] en la búsqueda para aprender más acerca de \"Después del parto (período de posparto): Después de la consulta. \"   © 8514-4354 Healthwise[employer] Incorporated. Instrucciones de cuidado adaptadas bajo licencia por [employer] (which disclaims liability or warranty for this information). Estas instrucciones de cuidado son para usarlas con saenz profesional clínico registrado. Si tiene preguntas acerca de lennox afección médica o de estas instrucciones, pregunte siempre a saenz profesional de [employer]. Healthwise, Incorporated niega cualquier garantía o responsabilidad por saenz uso de esta información.   Versión del contenido: [id number]; Última revisión: 20 marzo, 2012 17-Jun-2025